# Patient Record
Sex: FEMALE | Race: WHITE | NOT HISPANIC OR LATINO | Employment: FULL TIME | URBAN - METROPOLITAN AREA
[De-identification: names, ages, dates, MRNs, and addresses within clinical notes are randomized per-mention and may not be internally consistent; named-entity substitution may affect disease eponyms.]

---

## 2017-03-07 ENCOUNTER — OFFICE VISIT (OUTPATIENT)
Dept: OPTOMETRY | Facility: CLINIC | Age: 47
End: 2017-03-07
Payer: COMMERCIAL

## 2017-03-07 DIAGNOSIS — D31.31 CHOROIDAL NEVUS, RIGHT: Primary | ICD-10-CM

## 2017-03-07 PROCEDURE — 99999 PR PBB SHADOW E&M-EST. PATIENT-LVL II: CPT | Mod: PBBFAC,,, | Performed by: OPTOMETRIST

## 2017-03-07 PROCEDURE — 92014 COMPRE OPH EXAM EST PT 1/>: CPT | Mod: S$GLB,,, | Performed by: OPTOMETRIST

## 2017-03-07 RX ORDER — PREDNISONE 10 MG/1
TABLET ORAL
Refills: 0 | COMMUNITY
Start: 2017-02-01 | End: 2017-03-07 | Stop reason: ALTCHOICE

## 2017-03-07 RX ORDER — VALACYCLOVIR HYDROCHLORIDE 500 MG/1
TABLET, FILM COATED ORAL
Refills: 0 | COMMUNITY
Start: 2017-02-01 | End: 2017-03-07 | Stop reason: ALTCHOICE

## 2017-03-07 RX ORDER — CYANOCOBALAMIN 1000 UG/ML
INJECTION, SOLUTION INTRAMUSCULAR; SUBCUTANEOUS
Refills: 0 | COMMUNITY
Start: 2017-02-02 | End: 2017-03-07 | Stop reason: ALTCHOICE

## 2017-03-07 RX ORDER — HYDROCHLOROTHIAZIDE 25 MG/1
TABLET ORAL
Refills: 0 | COMMUNITY
Start: 2017-01-27 | End: 2017-03-07 | Stop reason: ALTCHOICE

## 2017-03-07 RX ORDER — METHYLPREDNISOLONE 4 MG/1
TABLET ORAL
Refills: 0 | COMMUNITY
Start: 2017-01-04 | End: 2017-03-07 | Stop reason: ALTCHOICE

## 2017-03-07 NOTE — PROGRESS NOTES
HPI     Patient's age: 46 y.o.    Approximate date of last eye examination:  2/27/15  Name of last eye doctor seen: Dr. Paredes    Pt states that she is here for exam, saw medical doctor who suggested to   get eyes checked again, not having any trouble with eyes.    Wears glasses? no     Wears CLs?:  no            Headaches?  no  Eye pain/discomfort?  no                                                                                     Flashes?  no  Floaters?  no  Diplopia/Double vision?  no    Patient's Ocular History:         Any eye surgeries? no         Family history of eye disease?  no    Significant patient medical history:         1. Diabetes?  no       If yes, IDDM or NIDDM? no   2. HBP?  no                  ! OTC eyedrops currently using:  none   ! Prescription eye meds currently using:  none            Last edited by Sapna De León MA on 3/7/2017  3:58 PM.         Assessment /Plan     For exam results, see Encounter Report.    Choroidal nevus, right      Good overall ocular health, monitor yearly    RTC 1 year, sooner PRN

## 2017-06-19 DIAGNOSIS — Z12.31 VISIT FOR SCREENING MAMMOGRAM: Primary | ICD-10-CM

## 2017-07-05 ENCOUNTER — LAB VISIT (OUTPATIENT)
Dept: LAB | Facility: OTHER | Age: 47
End: 2017-07-05
Attending: INTERNAL MEDICINE
Payer: COMMERCIAL

## 2017-07-05 DIAGNOSIS — N91.1 SECONDARY PHYSIOLOGIC AMENORRHEA: ICD-10-CM

## 2017-07-05 LAB
CALCIUM 24H UR-MRATE: 7 MG/HR
CALCIUM UR-MCNC: 3.7 MG/DL
CALCIUM URINE (MG/SPEC): 157 MG/SPEC
CREAT 24H UR-MRATE: 24.8 MG/HR
CREAT UR-MCNC: 14 MG/DL
CREATININE, URINE (MG/SPEC): 595 MG/SPEC
SODIUM 24H UR-SRATE: 11.2 MMOL/HR
SODIUM UR-SCNC: 63 MMOL/L
SODIUM URINE (MMOL/SPEC): 268 MMOL/SPEC
URINE COLLECTION DURATION: 24 HR
URINE VOLUME: 4250 ML

## 2017-07-05 PROCEDURE — 84300 ASSAY OF URINE SODIUM: CPT

## 2017-07-05 PROCEDURE — 82340 ASSAY OF CALCIUM IN URINE: CPT

## 2017-07-05 PROCEDURE — 82570 ASSAY OF URINE CREATININE: CPT

## 2017-07-06 ENCOUNTER — HOSPITAL ENCOUNTER (OUTPATIENT)
Dept: RADIOLOGY | Facility: OTHER | Age: 47
Discharge: HOME | End: 2017-07-06
Attending: INTERNAL MEDICINE
Payer: COMMERCIAL

## 2017-07-06 ENCOUNTER — PATIENT MESSAGE (OUTPATIENT)
Dept: ENDOCRINOLOGY | Facility: CLINIC | Age: 47
End: 2017-07-06

## 2017-07-06 VITALS — BODY MASS INDEX: 19.46 KG/M2 | WEIGHT: 128 LBS

## 2017-07-06 DIAGNOSIS — R82.994 HYPERCALCIURIA: Primary | ICD-10-CM

## 2017-07-06 DIAGNOSIS — Z12.31 VISIT FOR SCREENING MAMMOGRAM: ICD-10-CM

## 2017-07-06 PROCEDURE — 77067 SCR MAMMO BI INCL CAD: CPT | Mod: 26,,, | Performed by: RADIOLOGY

## 2017-07-06 PROCEDURE — 77063 BREAST TOMOSYNTHESIS BI: CPT | Mod: 26,,, | Performed by: RADIOLOGY

## 2017-07-06 PROCEDURE — 77067 SCR MAMMO BI INCL CAD: CPT | Mod: TC

## 2017-07-07 NOTE — TELEPHONE ENCOUNTER
Patient with large urine volume +4L    Repeat urine calcium   Needs two jugs   She will come on Monday July 10th for  thank you.

## 2017-09-05 ENCOUNTER — LAB VISIT (OUTPATIENT)
Dept: LAB | Facility: OTHER | Age: 47
End: 2017-09-05
Attending: INTERNAL MEDICINE
Payer: COMMERCIAL

## 2017-09-05 DIAGNOSIS — R82.994 HYPERCALCIURIA: ICD-10-CM

## 2017-09-05 LAB
CALCIUM 24H UR-MRATE: 6 MG/HR
CALCIUM UR-MCNC: 3 MG/DL
CALCIUM URINE (MG/SPEC): 149 MG/SPEC
CREAT 24H UR-MRATE: 29.9 MG/HR
CREAT UR-MCNC: 14.5 MG/DL
CREATININE, URINE (MG/SPEC): 717.8 MG/SPEC
URINE COLLECTION DURATION: 24 HR
URINE COLLECTION DURATION: 24 HR
URINE VOLUME: 4950 ML
URINE VOLUME: 4950 ML

## 2017-09-05 PROCEDURE — 82570 ASSAY OF URINE CREATININE: CPT

## 2017-09-05 PROCEDURE — 82340 ASSAY OF CALCIUM IN URINE: CPT

## 2017-10-11 ENCOUNTER — PATIENT MESSAGE (OUTPATIENT)
Dept: ENDOCRINOLOGY | Facility: CLINIC | Age: 47
End: 2017-10-11

## 2017-10-13 ENCOUNTER — PATIENT MESSAGE (OUTPATIENT)
Dept: ENDOCRINOLOGY | Facility: CLINIC | Age: 47
End: 2017-10-13

## 2017-11-18 ENCOUNTER — PATIENT MESSAGE (OUTPATIENT)
Dept: ENDOCRINOLOGY | Facility: CLINIC | Age: 47
End: 2017-11-18

## 2017-11-20 ENCOUNTER — OFFICE VISIT (OUTPATIENT)
Dept: ENDOCRINOLOGY | Facility: CLINIC | Age: 47
End: 2017-11-20
Payer: COMMERCIAL

## 2017-11-20 VITALS
BODY MASS INDEX: 19.91 KG/M2 | SYSTOLIC BLOOD PRESSURE: 110 MMHG | HEART RATE: 63 BPM | HEIGHT: 68 IN | DIASTOLIC BLOOD PRESSURE: 70 MMHG | WEIGHT: 131.38 LBS

## 2017-11-20 DIAGNOSIS — N91.1 SECONDARY PHYSIOLOGIC AMENORRHEA: ICD-10-CM

## 2017-11-20 DIAGNOSIS — R94.6 ABNORMAL THYROID FUNCTION TEST: ICD-10-CM

## 2017-11-20 DIAGNOSIS — M85.80 LOW BONE MASS: Primary | ICD-10-CM

## 2017-11-20 PROCEDURE — 99999 PR PBB SHADOW E&M-EST. PATIENT-LVL III: CPT | Mod: PBBFAC,,, | Performed by: INTERNAL MEDICINE

## 2017-11-20 PROCEDURE — 99214 OFFICE O/P EST MOD 30 MIN: CPT | Mod: S$GLB,,, | Performed by: INTERNAL MEDICINE

## 2017-11-20 RX ORDER — LEVOTHYROXINE SODIUM 25 UG/1
25 TABLET ORAL
Qty: 30 TABLET | Refills: 3 | Status: SHIPPED | OUTPATIENT
Start: 2017-11-20 | End: 2018-04-27

## 2017-11-20 NOTE — PROGRESS NOTES
"Subjective:     Patient ID: Dipti Levin is a 47 y.o. female.    Chief Complaint: Secondary physiologic amenorrhea    HPI:   Ms. Levin is a 47 y.o. female who is here for a follow-up visit for evaluation secondary amenorrhea in the context of low body weight, low bone mass and hypercalciuria. No menstrual bleed since last visit with me.     Since her last appointment with me has attempted IUI X 1 but this failed.   She plans to undergo a second IUI this week or next week.     We reviewed her blood levels done with Dr. Laguerre and Dr. Lucio,.   Serum TSH is 3.4 and 2.4. We discussed ideally serum blood work should be less than 2.5. She does not have a family history of thyroid disease.     We also discussed use of HRT v OCPs if she decides not to pursue additional fertility treatments.     Supplements:  Calcium   Vitamin D  Fish oil     Exercising:  Yoga, walking, swimming      Review of Systems   Constitutional: Negative for chills and fever.   HENT: Negative for congestion and sinus pressure.    Eyes: Negative for visual disturbance.   Respiratory: Negative for chest tightness and shortness of breath.    Cardiovascular: Negative for chest pain, palpitations and leg swelling.   Gastrointestinal: Negative for abdominal pain and vomiting.   Genitourinary: Negative for dysuria.   Musculoskeletal: Negative for arthralgias.   Skin: Negative for rash.   Neurological: Negative for weakness.   Hematological: Does not bruise/bleed easily.   Psychiatric/Behavioral: Negative for sleep disturbance.        Objective:     Physical Exam   Constitutional: She appears well-developed and well-nourished.       Vitals:    11/20/17 1525   BP: 110/70   BP Location: Right arm   Patient Position: Sitting   BP Method: Large (Manual)   Pulse: 63   Weight: 59.6 kg (131 lb 6.3 oz)   Height: 5' 8" (1.727 m)     BMD 2/2018    Results for DIPTI LEVIN (MRN [id number]) as of 11/20/2017 16:03   Ref. Range 9/5/2017 08:41 "   Calcium, Urine Latest Ref Range: 0.0 - 15.0 mg/dL 3.0   Calcium, 24H Urine Latest Ref Range: 4 - 12 mg/Hr 6   CA Urine (mg/Spec) Latest Units: mg/Spec 149   Creatinine, Ur (mg/spec) Latest Units: mg/Spec 717.8     Assessment/Plan:   Ms. Pacheco is a 47 year old woman who is here for following fertility treatment and plans to undergo a second soon.   Also discussed HRT/OCPs, would like to consider (we have discussed in the past but she deferred).     1. Secondary physiologic amenorrhea  - recommend HRT/OCP once patient has completed fertility treatments; discussed benefits and risks of replacement.   - TSH; Future  - T4, free; Future  - Thyroid peroxidase antibody; Future  - TSH  - T4, free  - Thyroid peroxidase antibody  - Calcium, Timed Urine; Future  - Creatinine, urine, timed; Future    Bone density due 2/2018    2. Low bone mass    - Calcium, Timed Urine; Future  - Creatinine, urine, timed; Future    3. Abnormal thyroid function test  - recommended levothyroxine 12.5 mcg daily during fertility treatments.   - TSH; Future  - T4, free; Future  - Thyroid peroxidase antibody; Future  - TSH  - T4, free  - Thyroid peroxidase antibody    F./u with me in one year or sooner as needed.

## 2017-11-29 ENCOUNTER — PATIENT MESSAGE (OUTPATIENT)
Dept: ENDOCRINOLOGY | Facility: CLINIC | Age: 47
End: 2017-11-29

## 2017-12-01 ENCOUNTER — TELEPHONE (OUTPATIENT)
Dept: ENDOCRINOLOGY | Facility: CLINIC | Age: 47
End: 2017-12-01

## 2017-12-01 DIAGNOSIS — M85.80 LOW BONE MASS: Primary | ICD-10-CM

## 2017-12-01 NOTE — TELEPHONE ENCOUNTER
Pt would like to get her BMD done before the end of the year. Can you order for scheduling.    ----- Message from Melissa Hansen sent at 12/1/2017  9:07 AM CST -----  Contact: Pt   [phone]  Dipp   -  Pt calling to be schedule to have an bone density test . Call the pt back to schedule

## 2017-12-05 ENCOUNTER — PATIENT MESSAGE (OUTPATIENT)
Dept: ENDOCRINOLOGY | Facility: CLINIC | Age: 47
End: 2017-12-05

## 2017-12-20 ENCOUNTER — HOSPITAL ENCOUNTER (OUTPATIENT)
Dept: RADIOLOGY | Facility: CLINIC | Age: 47
Discharge: HOME | End: 2017-12-20
Attending: INTERNAL MEDICINE
Payer: COMMERCIAL

## 2017-12-20 DIAGNOSIS — M85.80 LOW BONE MASS: ICD-10-CM

## 2017-12-20 PROCEDURE — 77080 DXA BONE DENSITY AXIAL: CPT | Mod: TC

## 2017-12-20 PROCEDURE — 77080 DXA BONE DENSITY AXIAL: CPT | Mod: 26,,, | Performed by: INTERNAL MEDICINE

## 2018-04-05 ENCOUNTER — PATIENT MESSAGE (OUTPATIENT)
Dept: ENDOCRINOLOGY | Facility: CLINIC | Age: 48
End: 2018-04-05

## 2018-04-12 ENCOUNTER — TELEPHONE (OUTPATIENT)
Dept: OPTOMETRY | Facility: CLINIC | Age: 48
End: 2018-04-12

## 2018-04-12 NOTE — TELEPHONE ENCOUNTER
[employer] Benefits as of 4/16/18:    Member: TRINI LEVIN  YOB: 1970  Subscriber ID: [id number]  Product Name: [id number]  Plan Code: 103  Please Note: Member must be eligible at date of service to receive benefit.  In Network Coverage Frequency  Category Benefit Eligibility Frequency  Exam Available 1 every 12 month(s)   Dilated Fundus Exam: Patient History Currently Unavailable  In Network Coverage  [facility] Patient Responsibility  (includes applicable copay)  Professional Services  Exam $25.00

## 2018-04-16 ENCOUNTER — OFFICE VISIT (OUTPATIENT)
Dept: OPTOMETRY | Facility: CLINIC | Age: 48
End: 2018-04-16
Payer: COMMERCIAL

## 2018-04-16 DIAGNOSIS — Z04.9 DISEASE RULED OUT AFTER EXAMINATION: ICD-10-CM

## 2018-04-16 DIAGNOSIS — H52.4 PRESBYOPIA: Primary | ICD-10-CM

## 2018-04-16 PROCEDURE — 99999 PR PBB SHADOW E&M-EST. PATIENT-LVL II: CPT | Mod: PBBFAC,,, | Performed by: OPTOMETRIST

## 2018-04-16 PROCEDURE — 92014 COMPRE OPH EXAM EST PT 1/>: CPT | Mod: S$GLB,,, | Performed by: OPTOMETRIST

## 2018-04-16 PROCEDURE — 92015 DETERMINE REFRACTIVE STATE: CPT | Mod: S$GLB,,, | Performed by: OPTOMETRIST

## 2018-04-16 NOTE — PROGRESS NOTES
HPI     Patient's age: 46 y.o.    Approximate date of last eye examination:  3/7/17  Name of last eye doctor seen: Dr. Paredes    Pt states that she is here for annual  exam, been noticing lately havinf   trouble seeing phone and eyes are getting tired a lot.    Wears glasses? no     Wears CLs?:  no            Headaches?  no  Eye pain/discomfort?  no                                                                                     Flashes?  no  Floaters?  no  Diplopia/Double vision?  no    Patient's Ocular History:         Any eye surgeries? no         Family history of eye disease?  no    Significant patient medical history:         1. Diabetes?  no       If yes, IDDM or NIDDM? no   2. HBP?  no                  ! OTC eyedrops currently using:  none   ! Prescription eye meds currently using:  none         Last edited by Sapna De León MA on 4/16/2018  3:22 PM. (History)            Assessment /Plan     For exam results, see Encounter Report.    Presbyopia    Disease ruled out after examination      Use +1.00 OTC readers PRN  Good internal ocular health, monitor    RTC 1 year, sooner PRN

## 2018-04-25 ENCOUNTER — TELEPHONE (OUTPATIENT)
Dept: HEMATOLOGY/ONCOLOGY | Facility: CLINIC | Age: 48
End: 2018-04-25

## 2018-04-26 NOTE — PROGRESS NOTES
CC:  macrocytosis    HPI:    Ms. Pacheco is a 48 yo woman with secondary physiologic amenorrhea who presents today for further evaluation of macrocytosis. Her CBC here on 2/14/2014 showed a normal Hb of 13.2, MCV 99. CBC at her PCP, Dr. Celis' office on  on 4/10/18 showed WBC 6.5, H/H 13.2/44.8, .1, plt count 208. CBC on 4/22/18 showed WBC 6.0, H/H 13.0/41.5, .7, plt count 217. TSH 3.1. Vit B12 level normal at 851. She presents today for further workup. She is considering IVF. Was briefly on synthroid 25 mcg this Jan as she was undergoing IUI at that time. No longer taking synthroid. Takes a vit B complex every day. Also has been taking zinc every day for a month for cold. Feels tired today but has been working very hard this week. 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. Father was from [place], mother is from [place]. No family history of hematologic disorder. She does not drink alcohol regularly, maybe one glass of wine every 2 weeks.       Past Medical History:   Diagnosis Date    Allergy     Anxiety          Family History   Problem Relation Age of Onset    Alcohol abuse Brother     Depression Brother     Diabetes Mellitus Paternal Grandmother     Alcohol abuse Paternal Grandfather     Breast cancer Mother 71    Cancer Mother      Breast    Melanoma Neg Hx        Past Surgical History:   Procedure Laterality Date    dental implants         Social History     Social History    Marital status: Single     Spouse name: N/A    Number of children: N/A    Years of education: N/A     Occupational History     [employer]     Social History Main Topics    Smoking status: Never Smoker    Smokeless tobacco: Never Used    Alcohol use Yes      Comment: socially    Drug use: No    Sexual activity: Not Currently     Other Topics Concern    Are You Pregnant Or Think You May Be? No    Breast-Feeding No     Social History Narrative    No narrative on file        Review of Systems   Constitutional: Positive for fatigue. Negative for activity change, appetite change, chills, fever and unexpected weight change.   HENT: Negative for mouth sores, nosebleeds, tinnitus, trouble swallowing and voice change.    Eyes: Negative for pain, redness and visual disturbance.   Respiratory: Negative for cough, shortness of breath and wheezing.    Cardiovascular: Negative for chest pain, palpitations and leg swelling.   Gastrointestinal: Negative for abdominal distention, abdominal pain, blood in stool, diarrhea, nausea and vomiting.   Endocrine: Negative for polydipsia, polyphagia and polyuria.   Genitourinary: Negative for frequency and hematuria.   Musculoskeletal: Negative for arthralgias, back pain, gait problem, joint swelling, myalgias, neck pain and neck stiffness.   Skin: Negative for color change, pallor, rash and wound.   Neurological: Negative for dizziness, tremors, seizures, syncope, speech difficulty, weakness, numbness and headaches.   Hematological: Negative for adenopathy. Does not bruise/bleed easily.   Psychiatric/Behavioral: Negative for confusion, dysphoric mood and self-injury. The patient is not nervous/anxious.        Objective:  Physical Exam   Constitutional: She is oriented to person, place, and time. She appears well-developed and well-nourished. No distress.   HENT:   Head: Normocephalic and atraumatic.   Mouth/Throat: Oropharynx is clear and moist. No oropharyngeal exudate.   Eyes: Conjunctivae and EOM are normal. Pupils are equal, round, and reactive to light. No scleral icterus.   Neck: Normal range of motion. Neck supple. No JVD present. No thyromegaly present.   Cardiovascular: Normal rate, regular rhythm, normal heart sounds and intact distal pulses.  Exam reveals no gallop and no friction rub.    No murmur heard.  Pulmonary/Chest: Effort normal and breath sounds normal. No respiratory distress. She has no wheezes. She has no rales.   Abdominal: Soft.  Bowel sounds are normal. She exhibits no distension and no mass. There is no hepatosplenomegaly. There is no tenderness. No hernia.   Musculoskeletal: Normal range of motion. She exhibits no edema, tenderness or deformity.   Lymphadenopathy:     She has no cervical adenopathy.   Neurological: She is alert and oriented to person, place, and time. No cranial nerve deficit. She exhibits normal muscle tone.   Skin: Skin is warm and dry. No rash noted. She is not diaphoretic. No erythema. No pallor.   Psychiatric: She has a normal mood and affect. Her behavior is normal. Judgment and thought content normal.   Vitals reviewed.      Labs:  Her CBC here on 2/14/2014 showed a normal Hb of 13.2, MCV 99. CBC at her PCP, Dr. Celis' office on  on 4/10/18 showed WBC 6.5, H/H 13.2/44.8, .1, plt count 208. CBC on 4/22/18 showed WBC 6.0, H/H 13.0/41.5, .7, plt count 217. TSH 3.1. Vit B12 level normal at 851.     Assessment and Plan:  1. Macrocytosis    2. Secondary physiologic amenorrhea      1.  - Ms. Pacheco is a very pleasant 48 yo woman with new development of macrocytosis. Has been taking zinc for a month. Vit B12 level normal.   - discussed with Ms. Pacheco that possible causes include folic acid deficiency, copper deficiency, liver disease, HIV, hypothyroidism  - will check CBC, retic, LDH, hapto, folic acid, copper, free T4  - Suspect copper deficiency due to zinc consumption. Asked patient to stop taking zinc  - No contraindication to IVF from a Hematologic Standpoint once copper is repleted (if turns out to have copper deficiency)    2.  - f/u with infertility specialist    RTC on 6/15 with repeat labs for follow up

## 2018-04-27 ENCOUNTER — LAB VISIT (OUTPATIENT)
Dept: LAB | Facility: OTHER | Age: 48
End: 2018-04-27
Attending: INTERNAL MEDICINE
Payer: COMMERCIAL

## 2018-04-27 ENCOUNTER — INITIAL CONSULT (OUTPATIENT)
Dept: HEMATOLOGY/ONCOLOGY | Facility: CLINIC | Age: 48
End: 2018-04-27
Payer: COMMERCIAL

## 2018-04-27 VITALS
BODY MASS INDEX: 19.71 KG/M2 | RESPIRATION RATE: 16 BRPM | HEIGHT: 68 IN | WEIGHT: 130.06 LBS | DIASTOLIC BLOOD PRESSURE: 64 MMHG | TEMPERATURE: 99 F | HEART RATE: 67 BPM | OXYGEN SATURATION: 98 % | SYSTOLIC BLOOD PRESSURE: 101 MMHG

## 2018-04-27 DIAGNOSIS — D75.89 MACROCYTOSIS: Primary | ICD-10-CM

## 2018-04-27 DIAGNOSIS — N91.1 SECONDARY PHYSIOLOGIC AMENORRHEA: ICD-10-CM

## 2018-04-27 DIAGNOSIS — D75.89 MACROCYTOSIS: ICD-10-CM

## 2018-04-27 LAB
ALBUMIN SERPL BCP-MCNC: 4 G/DL
ALP SERPL-CCNC: 63 U/L
ALT SERPL W/O P-5'-P-CCNC: 29 U/L
ANION GAP SERPL CALC-SCNC: 7 MMOL/L
AST SERPL-CCNC: 25 U/L
BASOPHILS # BLD AUTO: 0.04 K/UL
BASOPHILS NFR BLD: 0.7 %
BILIRUB SERPL-MCNC: 0.3 MG/DL
BUN SERPL-MCNC: 20 MG/DL
CALCIUM SERPL-MCNC: 9.7 MG/DL
CHLORIDE SERPL-SCNC: 101 MMOL/L
CO2 SERPL-SCNC: 27 MMOL/L
CREAT SERPL-MCNC: 0.8 MG/DL
DIFFERENTIAL METHOD: ABNORMAL
EOSINOPHIL # BLD AUTO: 0.1 K/UL
EOSINOPHIL NFR BLD: 1.6 %
ERYTHROCYTE [DISTWIDTH] IN BLOOD BY AUTOMATED COUNT: 13.2 %
EST. GFR  (AFRICAN AMERICAN): >60 ML/MIN/1.73 M^2
EST. GFR  (NON AFRICAN AMERICAN): >60 ML/MIN/1.73 M^2
FERRITIN SERPL-MCNC: 47 NG/ML
FOLATE SERPL-MCNC: 11.3 NG/ML
GLUCOSE SERPL-MCNC: 92 MG/DL
HAPTOGLOB SERPL-MCNC: 25 MG/DL
HCT VFR BLD AUTO: 40.8 %
HGB BLD-MCNC: 12.9 G/DL
IRON SERPL-MCNC: 57 UG/DL
LDH SERPL L TO P-CCNC: 164 U/L
LYMPHOCYTES # BLD AUTO: 1.9 K/UL
LYMPHOCYTES NFR BLD: 33.7 %
MCH RBC QN AUTO: 32.7 PG
MCHC RBC AUTO-ENTMCNC: 31.6 G/DL
MCV RBC AUTO: 104 FL
MONOCYTES # BLD AUTO: 0.3 K/UL
MONOCYTES NFR BLD: 6 %
NEUTROPHILS # BLD AUTO: 3.3 K/UL
NEUTROPHILS NFR BLD: 57.8 %
PLATELET # BLD AUTO: 232 K/UL
PMV BLD AUTO: 9.6 FL
POTASSIUM SERPL-SCNC: 5.1 MMOL/L
PROT SERPL-MCNC: 7.3 G/DL
RBC # BLD AUTO: 3.94 M/UL
RETICS/RBC NFR AUTO: 1.2 %
SATURATED IRON: 15 %
SODIUM SERPL-SCNC: 135 MMOL/L
T4 FREE SERPL-MCNC: 0.91 NG/DL
TOTAL IRON BINDING CAPACITY: 370 UG/DL
TRANSFERRIN SERPL-MCNC: 250 MG/DL
WBC # BLD AUTO: 5.64 K/UL

## 2018-04-27 PROCEDURE — 83540 ASSAY OF IRON: CPT

## 2018-04-27 PROCEDURE — 83010 ASSAY OF HAPTOGLOBIN QUANT: CPT

## 2018-04-27 PROCEDURE — 99204 OFFICE O/P NEW MOD 45 MIN: CPT | Mod: S$GLB,,, | Performed by: INTERNAL MEDICINE

## 2018-04-27 PROCEDURE — 82746 ASSAY OF FOLIC ACID SERUM: CPT

## 2018-04-27 PROCEDURE — 80053 COMPREHEN METABOLIC PANEL: CPT

## 2018-04-27 PROCEDURE — 83615 LACTATE (LD) (LDH) ENZYME: CPT

## 2018-04-27 PROCEDURE — 86703 HIV-1/HIV-2 1 RESULT ANTBDY: CPT

## 2018-04-27 PROCEDURE — 99999 PR PBB SHADOW E&M-EST. PATIENT-LVL III: CPT | Mod: PBBFAC,,, | Performed by: INTERNAL MEDICINE

## 2018-04-27 PROCEDURE — 85045 AUTOMATED RETICULOCYTE COUNT: CPT

## 2018-04-27 PROCEDURE — 36415 COLL VENOUS BLD VENIPUNCTURE: CPT

## 2018-04-27 PROCEDURE — 82525 ASSAY OF COPPER: CPT

## 2018-04-27 PROCEDURE — 82728 ASSAY OF FERRITIN: CPT

## 2018-04-27 PROCEDURE — 84439 ASSAY OF FREE THYROXINE: CPT

## 2018-04-27 PROCEDURE — 85025 COMPLETE CBC W/AUTO DIFF WBC: CPT

## 2018-04-27 NOTE — LETTER
April 27, 2018      Dhruv Celis MD  [address]   [address]  [facility] [address]           [facility]  [address], [address]  [facility] [address]  Phone: [phone]          Patient: Dipti Pacheco   MR Number: [id number]   YOB: 1970   Date of Visit: 4/27/2018       Dear Dr. Dhruv Celis:    Thank you for referring Dipti Pacheco to me for evaluation. Attached you will find relevant portions of my assessment and plan of care.    If you have questions, please do not hesitate to call me. I look forward to following Dipti Pacheco along with you.    Sincerely,    Keren Howard MD    Enclosure  CC:  No Recipients    If you would like to receive this communication electronically, please contact [email or web address] or [phone] to request more information on [employer] Link access.    For providers and/or their staff who would like to refer a patient to [facility], please contact us through our one-stop-shop provider referral line, [facility], at [phone].    If you feel you have received this communication in error or would no longer like to receive these types of communications, please e-mail [email or web address]

## 2018-04-27 NOTE — Clinical Note
Labs today (CBC, CMP, copper, retic, haptoglobin, LDH, folic acid, HIV, iron/TIBC, ferritin) RTC on 6/15 with CBC, copper level

## 2018-04-30 LAB — HIV 1+2 AB+HIV1 P24 AG SERPL QL IA: NEGATIVE

## 2018-05-01 LAB — COPPER SERPL-MCNC: 922 UG/L (ref 810–1990)

## 2018-05-04 ENCOUNTER — TELEPHONE (OUTPATIENT)
Dept: HEMATOLOGY/ONCOLOGY | Facility: CLINIC | Age: 48
End: 2018-05-04

## 2018-05-04 ENCOUNTER — PATIENT MESSAGE (OUTPATIENT)
Dept: HEMATOLOGY/ONCOLOGY | Facility: CLINIC | Age: 48
End: 2018-05-04

## 2018-05-10 ENCOUNTER — PATIENT MESSAGE (OUTPATIENT)
Dept: ENDOCRINOLOGY | Facility: CLINIC | Age: 48
End: 2018-05-10

## 2018-05-11 ENCOUNTER — PATIENT MESSAGE (OUTPATIENT)
Dept: ENDOCRINOLOGY | Facility: CLINIC | Age: 48
End: 2018-05-11

## 2018-05-11 DIAGNOSIS — R94.6 ABNORMAL THYROID FUNCTION TEST: Primary | ICD-10-CM

## 2018-05-12 RX ORDER — LEVOTHYROXINE SODIUM 50 UG/1
50 TABLET ORAL DAILY
Qty: 30 TABLET | Refills: 11 | Status: SHIPPED | OUTPATIENT
Start: 2018-05-12 | End: 2019-05-29

## 2018-06-11 ENCOUNTER — PATIENT MESSAGE (OUTPATIENT)
Dept: ENDOCRINOLOGY | Facility: CLINIC | Age: 48
End: 2018-06-11

## 2018-06-14 NOTE — PROGRESS NOTES
PROGRESS NOTE    Subjective:       Patient ID: Dipti Pacheco is a 47 y.o. female.    Chief Complaint: follow up for macrocytosis    HEMATOLOGIC HISTORY:  1. Ms. Pacheco is a 46 yo woman with secondary physiologic amenorrhea who initially saw me on 4/27/18 for further evaluation of macrocytosis. Her CBC here on 2/14/2014 showed a normal Hb of 13.2, MCV 99. CBC at her PCP, Dr. Celis' office on  on 4/10/18 showed WBC 6.5, H/H 13.2/44.8, .1, plt count 208. CBC on 4/22/18 showed WBC 6.0, H/H 13.0/41.5, .7, plt count 217. TSH 3.1. Vit B12 level normal at 851. She presents today for further workup. She is considering IVF. Was briefly on synthroid 25 mcg this Jan as she was undergoing IUI at that time. No longer taking synthroid. Takes a vit B complex every day. Also has been taking zinc every day for a month for cold. Feels tired today but has been working very hard this week. 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 Father was from [place], mother is from [place]. No family history of hematologic disorder. She does not drink alcohol regularly, maybe one glass of wine every 2 weeks. Workup here showed normal retic, LDH, folic acid, copper level, free T4. HIV negative. I asked her to stop taking zinc.     History of Present Illness:   Ms. Pacheco returns for follow up. Went to [facility] and had a good time there. She is starting IVF and will have embryo transfer in 2 weeks. Feeling good. Denies any complaints.     ROS:   Negative    Physical Examination:   Vital signs reviewed.   Gen: well hydrated, well developed, in no acute distress.  HEENT: normocephalic, anicteric, PERRLA, EOMI, oropharynx clear  Neck: supple, no JVD, thyromegaly, cervical or supraclavicular LAD  Lungs: CTAB, no wheezes or rales  Heart: RRR, no M/R/G  Abdomen: soft, no tenderness, non-distended, no hepatosplenomegaly, mass, or hernia.   Ext: no cyanosis,  edema, deformity  Neuro: alert and oriented x 4, no focal neuro deficit  Skin: no rash, open wounds or ulcers  Psych: pleasant and appropriate mood and affect    Objective:       Laboratory Data:  Labs reviewed. CBC shows normal WBC, Hb, and plt. .     Assessment/Plan:     1. Macrocytosis      - extensive workup negative  - not causing her any problems. Will monitor  - RTC in 6 months    Electronically signed by Keren Howard

## 2018-06-15 ENCOUNTER — OFFICE VISIT (OUTPATIENT)
Dept: HEMATOLOGY/ONCOLOGY | Facility: CLINIC | Age: 48
End: 2018-06-15
Payer: COMMERCIAL

## 2018-06-15 ENCOUNTER — PATIENT MESSAGE (OUTPATIENT)
Dept: ENDOCRINOLOGY | Facility: CLINIC | Age: 48
End: 2018-06-15

## 2018-06-15 ENCOUNTER — LAB VISIT (OUTPATIENT)
Dept: LAB | Facility: OTHER | Age: 48
End: 2018-06-15
Attending: INTERNAL MEDICINE
Payer: COMMERCIAL

## 2018-06-15 VITALS
DIASTOLIC BLOOD PRESSURE: 65 MMHG | HEIGHT: 68 IN | SYSTOLIC BLOOD PRESSURE: 109 MMHG | RESPIRATION RATE: 12 BRPM | WEIGHT: 130.94 LBS | OXYGEN SATURATION: 99 % | BODY MASS INDEX: 19.84 KG/M2 | HEART RATE: 66 BPM | TEMPERATURE: 98 F

## 2018-06-15 DIAGNOSIS — D75.89 MACROCYTOSIS: ICD-10-CM

## 2018-06-15 DIAGNOSIS — D75.89 MACROCYTOSIS: Primary | ICD-10-CM

## 2018-06-15 LAB
BASOPHILS # BLD AUTO: 0.04 K/UL
BASOPHILS NFR BLD: 0.5 %
DIFFERENTIAL METHOD: ABNORMAL
EOSINOPHIL # BLD AUTO: 0.1 K/UL
EOSINOPHIL NFR BLD: 0.7 %
ERYTHROCYTE [DISTWIDTH] IN BLOOD BY AUTOMATED COUNT: 13.2 %
HCT VFR BLD AUTO: 41.2 %
HGB BLD-MCNC: 13.2 G/DL
LYMPHOCYTES # BLD AUTO: 2 K/UL
LYMPHOCYTES NFR BLD: 26.2 %
MCH RBC QN AUTO: 32.7 PG
MCHC RBC AUTO-ENTMCNC: 32 G/DL
MCV RBC AUTO: 102 FL
MONOCYTES # BLD AUTO: 0.5 K/UL
MONOCYTES NFR BLD: 6.7 %
NEUTROPHILS # BLD AUTO: 4.9 K/UL
NEUTROPHILS NFR BLD: 65.6 %
PLATELET # BLD AUTO: 249 K/UL
PMV BLD AUTO: 9.6 FL
RBC # BLD AUTO: 4.04 M/UL
WBC # BLD AUTO: 7.49 K/UL

## 2018-06-15 PROCEDURE — 99999 PR PBB SHADOW E&M-EST. PATIENT-LVL III: CPT | Mod: PBBFAC,,, | Performed by: INTERNAL MEDICINE

## 2018-06-15 PROCEDURE — 36415 COLL VENOUS BLD VENIPUNCTURE: CPT

## 2018-06-15 PROCEDURE — 82525 ASSAY OF COPPER: CPT

## 2018-06-15 PROCEDURE — 85025 COMPLETE CBC W/AUTO DIFF WBC: CPT

## 2018-06-15 PROCEDURE — 3008F BODY MASS INDEX DOCD: CPT | Mod: CPTII,S$GLB,, | Performed by: INTERNAL MEDICINE

## 2018-06-15 PROCEDURE — 99213 OFFICE O/P EST LOW 20 MIN: CPT | Mod: S$GLB,,, | Performed by: INTERNAL MEDICINE

## 2018-06-15 RX ORDER — ASCORBIC ACID 1000 MG
1 TABLET ORAL DAILY
COMMUNITY
End: 2019-05-29

## 2018-06-19 LAB — COPPER SERPL-MCNC: 1499 UG/L (ref 810–1990)

## 2018-06-25 ENCOUNTER — PATIENT MESSAGE (OUTPATIENT)
Dept: HEMATOLOGY/ONCOLOGY | Facility: CLINIC | Age: 48
End: 2018-06-25

## 2018-06-30 ENCOUNTER — PATIENT MESSAGE (OUTPATIENT)
Dept: ENDOCRINOLOGY | Facility: CLINIC | Age: 48
End: 2018-06-30

## 2018-07-18 ENCOUNTER — PATIENT MESSAGE (OUTPATIENT)
Dept: ENDOCRINOLOGY | Facility: CLINIC | Age: 48
End: 2018-07-18

## 2018-08-11 ENCOUNTER — PATIENT MESSAGE (OUTPATIENT)
Dept: ENDOCRINOLOGY | Facility: CLINIC | Age: 48
End: 2018-08-11

## 2018-09-12 ENCOUNTER — PATIENT MESSAGE (OUTPATIENT)
Dept: ENDOCRINOLOGY | Facility: CLINIC | Age: 48
End: 2018-09-12

## 2018-09-13 ENCOUNTER — PATIENT MESSAGE (OUTPATIENT)
Dept: ENDOCRINOLOGY | Facility: CLINIC | Age: 48
End: 2018-09-13

## 2018-10-12 ENCOUNTER — PATIENT MESSAGE (OUTPATIENT)
Dept: ENDOCRINOLOGY | Facility: CLINIC | Age: 48
End: 2018-10-12

## 2018-10-13 ENCOUNTER — PATIENT MESSAGE (OUTPATIENT)
Dept: HEMATOLOGY/ONCOLOGY | Facility: CLINIC | Age: 48
End: 2018-10-13

## 2018-10-15 ENCOUNTER — PATIENT MESSAGE (OUTPATIENT)
Dept: ENDOCRINOLOGY | Facility: CLINIC | Age: 48
End: 2018-10-15

## 2018-10-26 ENCOUNTER — HOSPITAL ENCOUNTER (OUTPATIENT)
Dept: RADIOLOGY | Facility: OTHER | Age: 48
Discharge: HOME | End: 2018-10-26
Attending: INTERNAL MEDICINE
Payer: COMMERCIAL

## 2018-10-26 VITALS — HEIGHT: 68 IN | WEIGHT: 130 LBS | BODY MASS INDEX: 19.7 KG/M2

## 2018-10-26 DIAGNOSIS — Z12.39 ENCOUNTER FOR OTHER SCREENING FOR MALIGNANT NEOPLASM OF BREAST: ICD-10-CM

## 2018-10-26 DIAGNOSIS — Z12.31 VISIT FOR SCREENING MAMMOGRAM: ICD-10-CM

## 2018-10-26 PROCEDURE — 77067 SCR MAMMO BI INCL CAD: CPT | Mod: 26,,, | Performed by: RADIOLOGY

## 2018-10-26 PROCEDURE — 77063 BREAST TOMOSYNTHESIS BI: CPT | Mod: 26,,, | Performed by: RADIOLOGY

## 2018-10-26 PROCEDURE — 77063 BREAST TOMOSYNTHESIS BI: CPT | Mod: TC

## 2018-12-13 ENCOUNTER — PATIENT MESSAGE (OUTPATIENT)
Dept: ENDOCRINOLOGY | Facility: CLINIC | Age: 48
End: 2018-12-13

## 2018-12-17 ENCOUNTER — LAB VISIT (OUTPATIENT)
Dept: LAB | Facility: OTHER | Age: 48
End: 2018-12-17
Attending: INTERNAL MEDICINE
Payer: COMMERCIAL

## 2018-12-17 ENCOUNTER — OFFICE VISIT (OUTPATIENT)
Dept: HEMATOLOGY/ONCOLOGY | Facility: CLINIC | Age: 48
End: 2018-12-17
Payer: COMMERCIAL

## 2018-12-17 VITALS
SYSTOLIC BLOOD PRESSURE: 101 MMHG | WEIGHT: 130.75 LBS | HEART RATE: 64 BPM | RESPIRATION RATE: 16 BRPM | BODY MASS INDEX: 19.82 KG/M2 | DIASTOLIC BLOOD PRESSURE: 76 MMHG | HEIGHT: 68 IN | TEMPERATURE: 99 F | OXYGEN SATURATION: 100 %

## 2018-12-17 DIAGNOSIS — D75.89 MACROCYTOSIS: Primary | ICD-10-CM

## 2018-12-17 DIAGNOSIS — D75.89 MACROCYTOSIS: ICD-10-CM

## 2018-12-17 LAB
BASOPHILS # BLD AUTO: 0.04 K/UL
BASOPHILS NFR BLD: 0.9 %
DIFFERENTIAL METHOD: ABNORMAL
EOSINOPHIL # BLD AUTO: 0.1 K/UL
EOSINOPHIL NFR BLD: 2.1 %
ERYTHROCYTE [DISTWIDTH] IN BLOOD BY AUTOMATED COUNT: 12.6 %
HCT VFR BLD AUTO: 41.6 %
HGB BLD-MCNC: 13.9 G/DL
LYMPHOCYTES # BLD AUTO: 1.7 K/UL
LYMPHOCYTES NFR BLD: 39.4 %
MCH RBC QN AUTO: 31.9 PG
MCHC RBC AUTO-ENTMCNC: 33.4 G/DL
MCV RBC AUTO: 95 FL
MONOCYTES # BLD AUTO: 0.3 K/UL
MONOCYTES NFR BLD: 6.9 %
NEUTROPHILS # BLD AUTO: 2.2 K/UL
NEUTROPHILS NFR BLD: 50.5 %
PLATELET # BLD AUTO: 165 K/UL
PMV BLD AUTO: 9 FL
RBC # BLD AUTO: 4.36 M/UL
WBC # BLD AUTO: 4.34 K/UL

## 2018-12-17 PROCEDURE — 99999 PR PBB SHADOW E&M-EST. PATIENT-LVL III: CPT | Mod: PBBFAC,,, | Performed by: INTERNAL MEDICINE

## 2018-12-17 PROCEDURE — 3008F BODY MASS INDEX DOCD: CPT | Mod: CPTII,S$GLB,, | Performed by: INTERNAL MEDICINE

## 2018-12-17 PROCEDURE — 85025 COMPLETE CBC W/AUTO DIFF WBC: CPT

## 2018-12-17 PROCEDURE — 99213 OFFICE O/P EST LOW 20 MIN: CPT | Mod: S$GLB,,, | Performed by: INTERNAL MEDICINE

## 2018-12-17 PROCEDURE — 36415 COLL VENOUS BLD VENIPUNCTURE: CPT

## 2018-12-17 RX ORDER — ESTRADIOL 1 MG/1
TABLET ORAL
Refills: 3 | COMMUNITY
Start: 2018-11-24 | End: 2019-04-16

## 2018-12-17 RX ORDER — ESTRADIOL 2 MG/1
TABLET ORAL
Refills: 3 | COMMUNITY
Start: 2018-11-26 | End: 2019-04-16

## 2018-12-17 NOTE — PROGRESS NOTES
Subjective:       Patient ID: Dipti Pacheco is a 47 y.o. female.     Chief Complaint: follow up for macrocytosis     HEMATOLOGIC HISTORY:  1. Ms. Pacheco is a 46 yo woman with secondary physiologic amenorrhea who initially saw me on 4/27/18 for further evaluation of macrocytosis. Her CBC here on 2/14/2014 showed a normal Hb of 13.2, MCV 99. CBC at her PCP, Dr. Celis' office on  on 4/10/18 showed WBC 6.5, H/H 13.2/44.8, .1, plt count 208. CBC on 4/22/18 showed WBC 6.0, H/H 13.0/41.5, .7, plt count 217. TSH 3.1. Vit B12 level normal at 851. She presents today for further workup. She is considering IVF. Was briefly on synthroid 25 mcg this Jan as she was undergoing IUI at that time. No longer taking synthroid. Takes a vit B complex every day. Also has been taking zinc every day for a month for cold. Feels tired today but has been working very hard this week. 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 Father was from [place], mother is from [facility]. No family history of hematologic disorder. She does not drink alcohol regularly, maybe one glass of wine every 2 weeks. Workup here showed normal retic, LDH, folic acid, copper level, free T4. HIV negative. I asked her to stop taking zinc.      History of Present Illness:   Ms. Pacheco returns for follow up. She had IVF and is 7 weeks pregnant now. She is nauseated and tired, but is happy.      ROS:   Negative     Physical Examination:   Vital signs reviewed.   Gen: well hydrated, well developed, in no acute distress.  HEENT: normocephalic, anicteric, PERRLA, EOMI, oropharynx clear  Neck: supple, no JVD, thyromegaly, cervical or supraclavicular LAD  Lungs: CTAB, no wheezes or rales  Heart: RRR, no M/R/G  Abdomen: soft, rest deferred  Ext: no cyanosis, edema, deformity  Neuro: alert and oriented x 4, no focal neuro deficit  Skin: no rash, open wounds or ulcers  Psych: pleasant and appropriate mood and affect     Objective:          Laboratory Data:  Labs reviewed. CBC shows normal WBC, Hb, and plt. MCV normal at 95     Assessment/Plan:      1. Macrocytosis      - Ms Pacheco is a 47 yo woman  - extensive workup negative  - MCV normalized now  - discussed the result with Ms Pacheco. She can follow up with her PCP and see me on an as-needed basis. I am happy to see her back should questions arise in the future    Distress Screening Results: Psychosocial Distress screening score of Distress Score: 0 noted and reviewed. No intervention indicated.

## 2018-12-21 ENCOUNTER — TELEPHONE (OUTPATIENT)
Dept: OBSTETRICS AND GYNECOLOGY | Facility: CLINIC | Age: 48
End: 2018-12-21

## 2018-12-21 NOTE — TELEPHONE ENCOUNTER
----- Message from Tahira Rivero sent at 12/21/2018 12:45 PM CST -----  Contact: pt   Can the clinic reply in [facility]: yes    Who Called: TRINI LEVIN [[id number]]    Date of Positive Preg Test:  11-    1st day of Last Menstrual Cycle: 11-    List Any Difficulties: no    What Number to Call Back: [phone]

## 2019-01-22 ENCOUNTER — PATIENT MESSAGE (OUTPATIENT)
Dept: OBSTETRICS AND GYNECOLOGY | Facility: CLINIC | Age: 49
End: 2019-01-22

## 2019-01-22 ENCOUNTER — OFFICE VISIT (OUTPATIENT)
Dept: OBSTETRICS AND GYNECOLOGY | Facility: CLINIC | Age: 49
End: 2019-01-22
Payer: COMMERCIAL

## 2019-01-22 VITALS
HEIGHT: 68 IN | WEIGHT: 134.69 LBS | DIASTOLIC BLOOD PRESSURE: 76 MMHG | SYSTOLIC BLOOD PRESSURE: 112 MMHG | BODY MASS INDEX: 20.41 KG/M2

## 2019-01-22 DIAGNOSIS — E03.8 SUBCLINICAL HYPOTHYROIDISM: ICD-10-CM

## 2019-01-22 DIAGNOSIS — O09.91 PREGNANCY, HIGH-RISK, FIRST TRIMESTER: Primary | ICD-10-CM

## 2019-01-22 PROCEDURE — 99203 PR OFFICE/OUTPT VISIT, NEW, LEVL III, 30-44 MIN: ICD-10-PCS | Mod: S$GLB,,, | Performed by: OBSTETRICS & GYNECOLOGY

## 2019-01-22 PROCEDURE — 87086 URINE CULTURE/COLONY COUNT: CPT

## 2019-01-22 PROCEDURE — 99999 PR PBB SHADOW E&M-EST. PATIENT-LVL III: CPT | Mod: PBBFAC,,, | Performed by: OBSTETRICS & GYNECOLOGY

## 2019-01-22 PROCEDURE — 99203 OFFICE O/P NEW LOW 30 MIN: CPT | Mod: S$GLB,,, | Performed by: OBSTETRICS & GYNECOLOGY

## 2019-01-22 PROCEDURE — 99999 PR PBB SHADOW E&M-EST. PATIENT-LVL III: ICD-10-PCS | Mod: PBBFAC,,, | Performed by: OBSTETRICS & GYNECOLOGY

## 2019-01-22 RX ORDER — ACETAMINOPHEN AND CODEINE PHOSPHATE 300; 30 MG/1; MG/1
TABLET ORAL
COMMUNITY
Start: 2018-12-19 | End: 2019-04-16

## 2019-01-22 NOTE — PROGRESS NOTES
Dipti Pacheco is a 48 y.o. , presents today for initial OB visit.    C/C: initial OB visit    HPI: Patient's last menstrual period was 2018. She conceived via IVF (donor egg and donor sperm) in [place] (cost). She had PGS that was normal. She had NIPT that was normal - confirmed male. Overall is feeling well. Reports mild nausea. She is using sea bands which helps. Worse with drinking fluids. Has noticed breast tenderness. She had light implantation bleeding but otherwise no bleeding. Plans to stop estrogen and progesterone this week per [facility] in [place].    SOCIAL HISTORY: Denies emotional/mental/physical/sexual violence or abuse. She is single. She works at [employer]. She is single. Plans to have her mom and a friend attend the delivery. She is considering hiring a .    PAP HISTORY: Last pap 18 normal. No history of abnormal paps.     PMH:  Subclinical hypothyroidism diagnosed with fertility workup (TSH was over 2.5). TSH 0.8 this month. She has been taking synthroid 50 mcg on weekdays and 75 mcg on weekends.    Review of patient's allergies indicates:   Allergen Reactions    Aspirin Nausea Only     Past Medical History:   Diagnosis Date    Allergy     Anxiety     Subclinical hypothyroidism      Past Surgical History:   Procedure Laterality Date    dental implants      Egg retrieval       Past Surgical History:   Procedure Laterality Date    dental implants      Egg retrieval       OB History    Para Term  AB Living   0   0         SAB TAB Ectopic Multiple Live Births                       OB History      Para Term  AB Living    0   0          SAB TAB Ectopic Multiple Live Births                     Social History     Socioeconomic History    Marital status: Single     Spouse name: Not on file    Number of children: Not on file    Years of education: Not on file    Highest education level: Not on file   Social Needs    Financial resource strain:  Not on file    Food insecurity - worry: Not on file    Food insecurity - inability: Not on file    Transportation needs - medical: Not on file    Transportation needs - non-medical: Not on file   Occupational History     Employer: [employer]   Tobacco Use    Smoking status: Never Smoker    Smokeless tobacco: Never Used   Substance and Sexual Activity    Alcohol use: Yes     Comment: socially    Drug use: No    Sexual activity: Yes     Partners: Male   Other Topics Concern    Are you pregnant or think you may be? No    Breast-feeding No   Social History Narrative    Not on file     Family History   Problem Relation Age of Onset    Alcohol abuse Brother     Depression Brother     Diabetes Mellitus Paternal Grandmother     Alcohol abuse Paternal Grandfather     Breast cancer Mother 71    Cancer Mother         Breast    Melanoma Neg Hx      Social History     Substance and Sexual Activity   Sexual Activity Yes    Partners: Male       GENETIC SCREENING   Patient's age 35 years or older as of estimated date of delivery? no  Neural tube defect (meningomyelocele, spina bifida, or anencephaly)? no  Down syndrome? no  Ponce-Sachs (Ashkenazi Confucianist, Cajun, Icelandic [place])? no  Canavan disease (Ashkenazi Confucianist)? no  Familial dysautonomia (Ashkenazi Confucianist)? no  Sickle cell disease or trait ()? no  Hemophilia or other blood disorders? no  Cystic fibrosis? no  Muscular dystrophy? no  [place]'s chorea? no  Thalassemia (Italian, Greek, Mediterranean, or  background) MCV less than 80? no  Congenital heart defect? no  Mental retardation/autism? no   If Yes, was person tested for Fragile X? no  Other inherited genetic or chromosomal disorder? no  Maternal metabolic disorder (e.g. type 1 diabetes, PKU)? no  Patient or baby's father had a child with birth defects not listed above? no  Recurrent pregnancy loss or a stillbirth: no  Medications (including supplements, vitamins, herbs or OTC  "drugs)/illicit/recreational drugs/alcohol since last menstrual period? no   If yes, agent(s) and strength/dose: no  List any other genetic risks: no  Comments/counseling: no    INFECTION HISTORY  Live with someone with TB or exposed to TB: no  Patient or partner has history of genital herpes: yes  Rash or viral illness since last menstrual period: no  Patient or partner has hepatitis B or C: no  History of STD, gonorrhea, chlamydia, HPV, HIV, syphilis (list all that apply): no  List other infections: no  Additional comments: no    Dhruv Celis MD     ROS:    Constitutional/Gen: Denies fevers, chills, malaise, or weight loss. Pos fatigue   Psych: Denies depression, anxiety  Eyes: Denies changes in vision or scotomata  Ears, nose, mouth, throat: Denies sinus tenderness, swelling, or dentition problems  CV/vasc: Denies heart palpitations or edema  Resp: Denies SOB or dyspnea  Breasts: Denies mass, nipple discharge, or trauma. Pos breast tenderness.  GI: Denies constipation, diarrhea, or vomiting. Pos nausea.  : Denies vaginal discharge, dysuria or pelvic pain. Pos urinary frequency  MS: Denies weakness, soreness, or changes in ROM    OBJECTIVE:  /76   Ht 5' 8" (1.727 m)   Wt 61.1 kg (134 lb 11.2 oz)   LMP 11/04/2018   BMI 20.48 kg/m²   Constitutional/Gen: NAD, appears stated age, well groomed  Neck: supple, no masses or enlargement  Head: normocephalic  Skin: warm and dry w/o rash  Lung: normal resp effort, CTAB  Heart: normal HR, RRR   Back: negative CVAT  Breasts: bilaterally--no masses, tenderness, skin changes, or nipple discharge noted  Abdomen: soft, nontender, no masses, and bowel sounds normal, no enlargement  External genitalia: no lesions or discharge, normal hair distribution  Urethral meatus: normal size and location, no lesions or prolapse  Vagina: normal appearance, no lesions, no discharge, no evidence cystocele or rectocele.  Cervix: normal appearance, no discharge, no lesions, negative " CMT  Uterus: nontender, mobile, approx 12 week size, contour, and position. +++ FHTs via doppler  Adnexa: no masses or tenderness  Anus/Perineum: normal appearance, with no lesions or discharge. Internal exam deferred.  Extremities: FROM, with no edema or tenderness.  Neurologic: A&O x 4, non-focal, cranial nerves 2-12 grossly intact  Psych: affect appropriate and without signs of mood, thought or memory difficulty appreciated    UPT pos in office    ASSESSMENT:  48 y.o. female  with amenorrhea  Likely at 12w1d via LMP; S=D  Body mass index is 20.48 kg/m².  Patient Active Problem List   Diagnosis    Mouth ulcers    Leukoplakia    Secondary physiologic amenorrhea    Low bone mass    Hearing loss sensory, bilateral       PLAN:  1. Amenorrhea  -- + UPT in office, Patient's last menstrual period was 2018. --> Estimated Date of Delivery: 19  -- Dating US done with [employer]  -- Anatomical US 19-20 weeks  -- Routine serum and urine prenatal labs today    2. Physical exam today  -- Pap up to date.    3. BMI   -- Discussed IOM recommended weight gain of:   Underweight Less than 18.5 28-40    Normal Weight 18.5-24.9  25-35    Overweight 25-29.9  15-25    Obese   30 and greater  11-20   -- Discussed criteria for delivery at [facility] r/t excessive pre-preg weight or excessive weight gain:   Pre-pregnancy BMI over 40 or excess pregnancy weight gain defined as:   Pre-preg BMI < 18.5; Excess weight gain = > 60 pound   Pre-preg BMI 18.5-24.9;  Excess weight gain = > 53 pounds   Pre-preg BMI 25-29.9;  Excess weight gain = > 38 pounds   Pre-preg BMI > 30;  Excess weight gain = > 30 pounds    4. Discussed nausea and vomiting in pregnancy  -- Education regarding lifestyle and dietary modifications  -- Reviewed use of B6/Unisom prn. Pt will notify us if no relief/worsening symptoms, will consider alternative therapies prn    5. Pregnancy education and couseling; handouts and booklet provided  -- Oriented to  practice and anticipated prenatal course of care and how to contact us  -- Precautions/warning signs reviewed  -- Common complaints of pregnancy  -- Routine prenatal labs including HIV  -- Ultrasounds  -- Nutrition, prepregnant BMI, and recommended weight gain  -- Toxoplasmosis precautions (Cats/Raw Meat)  -- Sexual activity and exercise  -- Environmental/Work hazards  -- Travel  -- Tobacco (Ask, Advise, Assess, Assist, and Arrange), as well as alcohol and drug use  -- Use of any medications (Including supplements, Vitamins, Herbs, or OTC Drugs)  -- Domestic violence screen neg    6. Reviewed genetic testing options. Reviewed available first trimester and/or second  trimester screening options. Reviewed risk of false positive/negative results and recommendation of referral to [facility] in event of a positive result, for NIPT, US, and/or amniocentesis. Reviewed the possible estimated 1 in 300/500 risk of miscarriage with amniocentesis, even with a healthy fetus. Patient has had NIPT done.     7. AMA  -- NIPT done  -- Discussed risks associated with pregnancies over 40.  -- Recommended baby ASA at 12 weeks    8. IVF Pregnancy  -- Fetal Echo 22-24 weeks  -- Growth 32 weeks  -- Weekly PNT 32 weeks  -- Delivery by FENG    9. History of HSV   -- No recent outbreaks  -- PPx at 36 weeks    10. Subclinical hypothyroidism  -- Recent TSH 0.8  -- Recommended to continue synthroid 50 mcg daily    Discussed [facility], patient will consider. She wants to take prenatal classes. Briefly discussed patient's desire for unmedicated childbirth. Briefly discussed  circumcision.     RTC x 4 weeks, call or present sooner prn.     Updated Medication List:  Current Outpatient Medications   Medication Sig Dispense Refill    B INFANTIS/B ANI/B JET/B BIFID (PROBIOTIC DIGESTIVE CARE ORAL) Take 1 capsule by mouth as needed.      calcium-vitamin D3 (CALCIUM 500 + D) 500 mg(1,250mg) -200 unit per tablet Take 1 tablet by mouth 2 (two) times  daily with meals.      cyanocobalamin, vitamin B-12, (VITAMIN B-12) 50 mcg tablet Take 50 mcg by mouth once daily.      estradiol (ESTRACE) 1 MG tablet TK 1 T PO BID  3    estradiol (ESTRACE) 2 MG tablet TK 1 T PO  BID 12 HOURS APART  3    ginkgo biloba 40 mg Tab Take 1 tablet by mouth once daily.      levothyroxine (SYNTHROID) 50 MCG tablet Take 1 tablet (50 mcg total) by mouth once daily. 30 tablet 11    multivitamin (ONE DAILY MULTIVITAMIN) per tablet Take 1 tablet by mouth once daily.      prenatal vit 55-iron-folic-om3 29-1-430 mg CPKD Take 1 tablet by mouth once daily.      PROGESTERONE IU       acetaminophen-codeine 300-30mg (TYLENOL #3) 300-30 mg Tab        No current facility-administered medications for this visit.          Edith Machado MD  1/23/2019 3:44 PM

## 2019-01-23 ENCOUNTER — PATIENT MESSAGE (OUTPATIENT)
Dept: OBSTETRICS AND GYNECOLOGY | Facility: CLINIC | Age: 49
End: 2019-01-23

## 2019-01-24 LAB
BACTERIA UR CULT: NORMAL
BACTERIA UR CULT: NORMAL

## 2019-01-27 ENCOUNTER — PATIENT MESSAGE (OUTPATIENT)
Dept: OBSTETRICS AND GYNECOLOGY | Facility: CLINIC | Age: 49
End: 2019-01-27

## 2019-02-04 ENCOUNTER — PATIENT MESSAGE (OUTPATIENT)
Dept: OBSTETRICS AND GYNECOLOGY | Facility: CLINIC | Age: 49
End: 2019-02-04

## 2019-02-07 ENCOUNTER — PATIENT MESSAGE (OUTPATIENT)
Dept: OBSTETRICS AND GYNECOLOGY | Facility: CLINIC | Age: 49
End: 2019-02-07

## 2019-02-08 ENCOUNTER — PATIENT MESSAGE (OUTPATIENT)
Dept: OBSTETRICS AND GYNECOLOGY | Facility: CLINIC | Age: 49
End: 2019-02-08

## 2019-02-08 ENCOUNTER — CLINICAL SUPPORT (OUTPATIENT)
Dept: REHABILITATION | Facility: HOSPITAL | Age: 49
End: 2019-02-08
Attending: OBSTETRICS & GYNECOLOGY
Payer: COMMERCIAL

## 2019-02-08 DIAGNOSIS — R29.3 POSTURE IMBALANCE: ICD-10-CM

## 2019-02-08 PROCEDURE — 97162 PT EVAL MOD COMPLEX 30 MIN: CPT | Mod: PO

## 2019-02-08 PROCEDURE — 97110 THERAPEUTIC EXERCISES: CPT | Mod: PO

## 2019-02-08 NOTE — PLAN OF CARE
Physical Therapy Evaluation    Name: Dipti Patel [facility]  Clinic Number: [id number]    Diagnosis:   Encounter Diagnosis   Name Primary?    Posture imbalance      Physician: Edith Machado MD  Treatment Orders: PT Eval and Treat    History     Past Medical History:   Diagnosis Date    Allergy     Anxiety     Subclinical hypothyroidism      Current Outpatient Medications   Medication Sig    acetaminophen-codeine 300-30mg (TYLENOL #3) 300-30 mg Tab     B INFANTIS/B ANI/B JET/B BIFID (PROBIOTIC DIGESTIVE CARE ORAL) Take 1 capsule by mouth as needed.    calcium-vitamin D3 (CALCIUM 500 + D) 500 mg(1,250mg) -200 unit per tablet Take 1 tablet by mouth 2 (two) times daily with meals.    cyanocobalamin, vitamin B-12, (VITAMIN B-12) 50 mcg tablet Take 50 mcg by mouth once daily.    estradiol (ESTRACE) 1 MG tablet TK 1 T PO BID    estradiol (ESTRACE) 2 MG tablet TK 1 T PO  BID 12 HOURS APART    ginkgo biloba 40 mg Tab Take 1 tablet by mouth once daily.    levothyroxine (SYNTHROID) 50 MCG tablet Take 1 tablet (50 mcg total) by mouth once daily.    multivitamin (ONE DAILY MULTIVITAMIN) per tablet Take 1 tablet by mouth once daily.    prenatal vit 55-iron-folic-om3 29-1-430 mg CPKD Take 1 tablet by mouth once daily.    PROGESTERONE IU      No current facility-administered medications for this visit.      Review of patient's allergies indicates:   Allergen Reactions    Aspirin Nausea Only       Evaluation Date: 2/8/19  Visit # authorized: 1/20  Authorization period: 12/31/19  Plan of care expiration: 4/7/19    Subjective   Pt. is 14 weeks pregnant    History of present condition:  Dipti is a 48 y.o. female that presents to [facility] secondary to hi risk pregnancy. Injury/surgery occurred approximately: September 2018. Pt. presents with the following co-morbidities and personal factors that directly impact her plan of care: chronicity of condition.        Precautions:  standard    Onset/CHAR: sudden: in September pt. was working out: lifting weighted bar [place], dead lifts; noted having extreme pain in low back. Pt. went to the chiropractor for treatment: went 2 to 3 times per week for a few weeks. Most recently discontinued treatment. Current symptoms include right sided low back pain greater than left.   Red Flags:  · Bowel/bladder symptoms (urinary retention/fecal incontinence)? No  · Recent weight loss? No.  · Constant/Night pain that is unchanging with change of position? No.  · PMH of CA? No.   · Numbness or Tingling? No.    Aggravating factors: lifting objects, carrying objects I.e. groceries from car to home, and prolonged sitting  Relieving factors: movement  Pain Scale: Dipti rates pain on a scale of 0-10 to be 0 at currently; 0 at best; 5 at worst.    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  ADLs: Pt has a decrease ability to perform ADLs such as see above.    Patient Goals: Pt would like to learn advise/suggestions for education on exercises, symptom management, and obtain information to assist her with pregnancy    Objective     Observation: ectomorph    Posture: slightly kyphotic    Lumbar ROM: (measured in degrees)    Degrees Quality   Flexion   WFL    Left Side Bending WFL    Right Side Bending WFL    Left rotation 25%    Right Rotation 25%      Dermatomes: (impaired/normal)     RLE LLE   L2 Intact Intact   L3 Intact Intact   L4 Intact Intact   L5 Intact Intact   S1 Intact Intact     Reflexes: L3; 2 left; unable to obtain right; S1: 2 bilaterally    Lower Extremity Strength (graded 0-5 out of 5)   RLE LLE   Hip  flexion: 4/5 4/5   Hip ER 4/5 4/5   Hip IR 4+/5 4+/5   Knee extension: 4+/5 4+/5   Ankle dorsiflexion: 5/5 5/5   Great toe extension: 4+/5 4+/5   Posterior fibers of Gluteus medius 4+/5 4+/5   Knee flexion seated 4+/5 4+/5   Ankle plantarflexion seated 4+/5 4+/5     Special Tests: ((+): pos.; (-): neg.)    · Bridge Test: +  · Pirformis Test: -    Flexibility:   · Popliteal Angle: R = 80 degrees ; L = 70 degrees    Palpation for condition:   · Position:     · Warmth:   · Swelling:   · Texture:     Joint Mobility: (graded 0-6 out of 6) NT    Functional Status Measures:    Intake Score     Pts Physical FS Primary Measure      63                          Risk Adjustment Statistical FOTO     57        PT reviewed FOTO scores for Dipti Pacheco on 02/08/2019.   FOTO scores were entered into [employer] - see media section.    History  Co-morbidities and personal factors that may impact the plan of care Examination  Body Structures and Functions, activity limitations and participation restrictions that may impact the plan of care Clinical Presentation   Decision Making/ Complexity Score   Co-morbidities:   chronicity of condition            Personal Factors:   none Body Regions: cervical/left shoulder/SIJ/pelvic dysfunction    Body Systems: Decreased lumbar AROM; core hip lumbopelvic weakness; poor posture; pain with transitional activities, decrease exercise ability, and pain with ADLs.     Activity limitations: lifting and prolonged sitting    Participation Restrictions: some yoga poses and lifting objects evolving with changing clinical characteristics   moderate     Clinical Presentation/complexity category  Moderate complexity category: pt. has 1-2 personal factors and/or co-morbidities directly  impacting POC, 3 or more body system impairments/functional limitations/participation restrictions; as well as, condition is evolving with changing clinical characteristics.    PT Evaluation Completed? Yes  Discussed  Plan of Care with patient: Yes    TREATMENT:  Therapeutic exercise: Dipti received therapeutic exercises to develop strength and endurance, flexibility for 10 minutes including: bridge with band, isometric hip abduction/adduction, self rib mobilization with UT stretch, serratus slides, Breugger's exercise, and education on pelvic stabilization.  *Pt was advised to perform these exercises free of pain, and discontinue use if symptoms persist/worsen.    Pt. Education: Instructed pt. regarding:body mechanics, posture, activity modification/avoidance, and proper technique with all exercises. Pt. to demonstrate good understanding of the education provided. Dipti demonstrated good return demonstration of activities. No cultural, environmental, or spiritual barriers identified to treatment or learning.    Medical necessity is demonstrated by the following IMPAIRMENTS/PROBLEM LIST:   1) Pain limiting function   2) Posture dysfunction   3) Core/Lumbar/LE weakness   4) Decreased thoracic/lumbar joint mobility   5) Decreased Lumbar ROM   6) Decreased soft tissue extensibility/fascia restriction   7) Decreased LE flexibility: hamstrings   8) Lack of HEP   9) posture imbalance    GOALS:   Short Term Goals:  2 weeks  1. Pt. to demonstrate proper cervical and scapula retraction requiring min. to no verbal cues from PT  2. Pt. to demonstrate increased MMT for core/lumbar paraspinals to 3/5 to increase endurance with prolonged sitting/standing.  3. Pt. to be independent with symptom management  4. Pt to tolerate HEP to improve ROM and independence with ADL's    Long Term Goals: 4 weeks  1. Report decreased lumbar spine pain </=  2/10 at worst to increase tolerance for prolonged sitting/standing  2. Pt. to demonstrate proper cervical and scapula retraction requiring no verbal cues from PT  3. Pt. to demonstrate increased MMT for core/lumbar paraspinals to 3+/5 to increase endurance with prolonged sitting.   4. Pt. to demonstrate  increased MMT for bilateral gluteus medius to 5/5 to increase stability during ambulation on uneven surfaces.  5. Pt. to demonstrate increased MMT for bilateral hip flexor to 5/5 to increase tolerance for ADL and work activities.   6. Pt to be independent with HEP to improve ROM and independence with ADL's  7. Pt. to be independent with pelvis stabilization/postures/symptom management  Assessment   This is a 48 y.o. female referred to outpatient physical therapy who presents with a medical diagnosis of high risk pregnancy and PT diagnosis of posture imbalance demonstrating joint dysfunction and functional limitation as described above. Level of complexity is moderate;  based on patient's past medical history including the above co-morbidities and personal factors; functional limitations, and clinical presentation directly impacting his/her plan of care. Pt demonstrates good rehab potential.    Patient presents with signs and symptoms consistent with the above diagnoses. Pt. could benefit from a pelvic  consult regarding pelvic floor training. Pt. was educated on the prognosis and the progression of her pregnancy, which may change her current symptoms. Pt. verbalized understanding. Patient was in agreement with set goals and plan of care. Pt will benefit from physical therapy services in order to maximize pain free functional independence.     Plan     Pt will be treated by physical therapy 1-3 times a week for 8 weeks for pt. education, HEP, aquatic therapy if land based therapy is not tolerable, therapeutic exercises, neuromuscular re-education, soft tissue and joint mobilizations; and modalities, including but not exclusive to dry needling, prn to achieve established goals. Dipti may at times be seen by a PTA as part of the Rehab Team.     I certify the need for these services furnished under this plan of treatment and while under my care.______________________________ Physician/Referring  Practitioner  Date of Signature

## 2019-02-22 ENCOUNTER — LAB VISIT (OUTPATIENT)
Dept: LAB | Facility: OTHER | Age: 49
End: 2019-02-22
Attending: OBSTETRICS & GYNECOLOGY
Payer: COMMERCIAL

## 2019-02-22 ENCOUNTER — PATIENT MESSAGE (OUTPATIENT)
Dept: ADMINISTRATIVE | Facility: OTHER | Age: 49
End: 2019-02-22

## 2019-02-22 ENCOUNTER — ROUTINE PRENATAL (OUTPATIENT)
Dept: OBSTETRICS AND GYNECOLOGY | Facility: CLINIC | Age: 49
End: 2019-02-22
Payer: COMMERCIAL

## 2019-02-22 VITALS
WEIGHT: 138.25 LBS | DIASTOLIC BLOOD PRESSURE: 58 MMHG | BODY MASS INDEX: 21.02 KG/M2 | SYSTOLIC BLOOD PRESSURE: 100 MMHG

## 2019-02-22 DIAGNOSIS — Z36.89 ENCOUNTER FOR FETAL ANATOMIC SURVEY: ICD-10-CM

## 2019-02-22 DIAGNOSIS — O09.92 SUPERVISION OF HIGH RISK PREGNANCY IN SECOND TRIMESTER: Primary | ICD-10-CM

## 2019-02-22 DIAGNOSIS — L29.9 SKIN PRURITUS: ICD-10-CM

## 2019-02-22 DIAGNOSIS — N89.8 VAGINAL ITCHING: ICD-10-CM

## 2019-02-22 DIAGNOSIS — O09.522 ELDERLY MULTIGRAVIDA IN SECOND TRIMESTER: ICD-10-CM

## 2019-02-22 DIAGNOSIS — O09.92 SUPERVISION OF HIGH RISK PREGNANCY IN SECOND TRIMESTER: ICD-10-CM

## 2019-02-22 DIAGNOSIS — E03.8 SUBCLINICAL HYPOTHYROIDISM: ICD-10-CM

## 2019-02-22 DIAGNOSIS — O09.812 HIGH RISK PREGNANCY DUE TO ASSISTED REPRODUCTIVE TECHNOLOGY IN SECOND TRIMESTER: ICD-10-CM

## 2019-02-22 LAB
FERRITIN SERPL-MCNC: 56 NG/ML
IRON SERPL-MCNC: 78 UG/DL
SATURATED IRON: 22 %
TOTAL IRON BINDING CAPACITY: 351 UG/DL
TRANSFERRIN SERPL-MCNC: 237 MG/DL
TSH SERPL DL<=0.005 MIU/L-ACNC: 2.23 UIU/ML

## 2019-02-22 PROCEDURE — 0502F PR SUBSEQUENT PRENATAL CARE: ICD-10-PCS | Mod: CPTII,S$GLB,, | Performed by: OBSTETRICS & GYNECOLOGY

## 2019-02-22 PROCEDURE — 0502F SUBSEQUENT PRENATAL CARE: CPT | Mod: CPTII,S$GLB,, | Performed by: OBSTETRICS & GYNECOLOGY

## 2019-02-22 PROCEDURE — 84443 ASSAY THYROID STIM HORMONE: CPT

## 2019-02-22 PROCEDURE — 87510 GARDNER VAG DNA DIR PROBE: CPT

## 2019-02-22 PROCEDURE — 83540 ASSAY OF IRON: CPT

## 2019-02-22 PROCEDURE — 82728 ASSAY OF FERRITIN: CPT

## 2019-02-22 PROCEDURE — 99999 PR PBB SHADOW E&M-EST. PATIENT-LVL II: ICD-10-PCS | Mod: PBBFAC,,, | Performed by: OBSTETRICS & GYNECOLOGY

## 2019-02-22 PROCEDURE — 87480 CANDIDA DNA DIR PROBE: CPT

## 2019-02-22 PROCEDURE — 99999 PR PBB SHADOW E&M-EST. PATIENT-LVL II: CPT | Mod: PBBFAC,,, | Performed by: OBSTETRICS & GYNECOLOGY

## 2019-02-22 PROCEDURE — 36415 COLL VENOUS BLD VENIPUNCTURE: CPT

## 2019-02-22 RX ORDER — HYDROCORTISONE 25 MG/G
CREAM TOPICAL 2 TIMES DAILY
Qty: 20 G | Refills: 1 | Status: SHIPPED | OUTPATIENT
Start: 2019-02-22 | End: 2019-04-01 | Stop reason: SDUPTHER

## 2019-02-25 ENCOUNTER — PATIENT MESSAGE (OUTPATIENT)
Dept: OBSTETRICS AND GYNECOLOGY | Facility: CLINIC | Age: 49
End: 2019-02-25

## 2019-02-25 PROBLEM — O09.812 HIGH RISK PREGNANCY DUE TO ASSISTED REPRODUCTIVE TECHNOLOGY IN SECOND TRIMESTER: Status: ACTIVE | Noted: 2019-02-25

## 2019-02-25 PROBLEM — O09.92 SUPERVISION OF HIGH RISK PREGNANCY IN SECOND TRIMESTER: Status: ACTIVE | Noted: 2019-02-25

## 2019-02-25 PROBLEM — O09.522 ELDERLY MULTIGRAVIDA IN SECOND TRIMESTER: Status: ACTIVE | Noted: 2019-02-25

## 2019-02-26 ENCOUNTER — PATIENT OUTREACH (OUTPATIENT)
Dept: OTHER | Facility: OTHER | Age: 49
End: 2019-02-26

## 2019-02-26 NOTE — TELEPHONE ENCOUNTER
Initial introduction with MsIrving Dipti Parikh Amanda Pacheco completed and the role of the health coaches for [facility] was explained.     Reviewed the importance of taking weights and blood pressure readings, using the health  as a resource for physical activity and dietary habits, and that [facility] messages will be sent for weeks 20, 30, and 37 home urine tests.  Reviewed that the patient should contact her OB team with any specific questions regarding her pregnancy, and to contact [facility] or 911 in the case of a medical emergency.

## 2019-02-28 ENCOUNTER — PATIENT MESSAGE (OUTPATIENT)
Dept: OBSTETRICS AND GYNECOLOGY | Facility: CLINIC | Age: 49
End: 2019-02-28

## 2019-02-28 LAB
BACTERIAL VAGINOSIS DNA: NEGATIVE
CANDIDA GLABRATA DNA: NEGATIVE
CANDIDA KRUSEI DNA: NEGATIVE
CANDIDA RRNA VAG QL PROBE: NEGATIVE
G VAGINALIS RRNA GENITAL QL PROBE: NORMAL
T VAGINALIS RRNA GENITAL QL PROBE: NEGATIVE

## 2019-03-04 ENCOUNTER — PATIENT MESSAGE (OUTPATIENT)
Dept: OBSTETRICS AND GYNECOLOGY | Facility: CLINIC | Age: 49
End: 2019-03-04

## 2019-03-07 DIAGNOSIS — O09.92 SUPERVISION OF HIGH RISK PREGNANCY IN SECOND TRIMESTER: Primary | ICD-10-CM

## 2019-03-18 ENCOUNTER — PROCEDURE VISIT (OUTPATIENT)
Dept: MATERNAL FETAL MEDICINE | Facility: CLINIC | Age: 49
End: 2019-03-18
Payer: COMMERCIAL

## 2019-03-18 ENCOUNTER — TELEPHONE (OUTPATIENT)
Dept: PEDIATRIC CARDIOLOGY | Facility: CLINIC | Age: 49
End: 2019-03-18

## 2019-03-18 ENCOUNTER — INITIAL PRENATAL (OUTPATIENT)
Dept: OBSTETRICS AND GYNECOLOGY | Facility: CLINIC | Age: 49
End: 2019-03-18
Payer: COMMERCIAL

## 2019-03-18 DIAGNOSIS — Z34.92 PRENATAL CARE IN SECOND TRIMESTER: Primary | ICD-10-CM

## 2019-03-18 DIAGNOSIS — O09.819 PREGNANCY RESULTING FROM IN VITRO FERTILIZATION, ANTEPARTUM: ICD-10-CM

## 2019-03-18 DIAGNOSIS — O09.519 ADVANCED MATERNAL AGE, PRIMIGRAVIDA, ANTEPARTUM: Primary | ICD-10-CM

## 2019-03-18 DIAGNOSIS — Z36.89 ENCOUNTER FOR ULTRASOUND TO CHECK FETAL GROWTH: ICD-10-CM

## 2019-03-18 DIAGNOSIS — Z36.89 ENCOUNTER FOR FETAL ANATOMIC SURVEY: ICD-10-CM

## 2019-03-18 PROCEDURE — 99499 NO LOS: ICD-10-PCS | Mod: S$GLB,,, | Performed by: ADVANCED PRACTICE MIDWIFE

## 2019-03-18 PROCEDURE — 76811 PR US, OB FETAL EVAL & EXAM, TRANSABDOM,FIRST GESTATION: ICD-10-PCS | Mod: S$GLB,,, | Performed by: OBSTETRICS & GYNECOLOGY

## 2019-03-18 PROCEDURE — 99999 PR PBB SHADOW E&M-EST. PATIENT-LVL I: CPT | Mod: PBBFAC,,, | Performed by: ADVANCED PRACTICE MIDWIFE

## 2019-03-18 PROCEDURE — 99499 NO LOS: ICD-10-PCS | Mod: S$GLB,,, | Performed by: OBSTETRICS & GYNECOLOGY

## 2019-03-18 PROCEDURE — 99499 UNLISTED E&M SERVICE: CPT | Mod: S$GLB,,, | Performed by: OBSTETRICS & GYNECOLOGY

## 2019-03-18 PROCEDURE — 99499 UNLISTED E&M SERVICE: CPT | Mod: S$GLB,,, | Performed by: ADVANCED PRACTICE MIDWIFE

## 2019-03-18 PROCEDURE — 99999 PR PBB SHADOW E&M-EST. PATIENT-LVL I: ICD-10-PCS | Mod: PBBFAC,,, | Performed by: ADVANCED PRACTICE MIDWIFE

## 2019-03-18 PROCEDURE — 76811 OB US DETAILED SNGL FETUS: CPT | Mod: S$GLB,,, | Performed by: OBSTETRICS & GYNECOLOGY

## 2019-03-18 NOTE — TELEPHONE ENCOUNTER
Spoke with patient via telephone. Fetal echo scheduled for 4/30/19 @ 245 pm. Office number and address verified.

## 2019-03-19 ENCOUNTER — PATIENT MESSAGE (OUTPATIENT)
Dept: OBSTETRICS AND GYNECOLOGY | Facility: CLINIC | Age: 49
End: 2019-03-19

## 2019-03-19 ENCOUNTER — PATIENT MESSAGE (OUTPATIENT)
Dept: ADMINISTRATIVE | Facility: OTHER | Age: 49
End: 2019-03-19

## 2019-03-19 NOTE — PROGRESS NOTES
Dipti presented for discussion/information seeking visit only - NOT formally transferring at this time, just considering transfer and wanted to learn more about the midwifery service at [facility]. Did not take any vital signs not auscultate FHTs - she just wants to talk today. Has anatomy US today after our appt.  Questions answered, tour provided  49yo G1 at 20w0d via IVF   -- AMA at 49yo: Reviewed MFM recommendations  -- IVF: Reviewed 3T IVF recommendations and MFM recommendations for IOL/timing of delivery  -- HSV: Reviewed recommendation for suppressive meds and implications for delivery  -- Subclinical hypothyroidism on synthroid: continue meds and labs

## 2019-03-20 ENCOUNTER — PATIENT MESSAGE (OUTPATIENT)
Dept: OBSTETRICS AND GYNECOLOGY | Facility: CLINIC | Age: 49
End: 2019-03-20

## 2019-03-30 ENCOUNTER — PATIENT MESSAGE (OUTPATIENT)
Dept: OBSTETRICS AND GYNECOLOGY | Facility: CLINIC | Age: 49
End: 2019-03-30

## 2019-03-30 DIAGNOSIS — L29.9 SKIN PRURITUS: ICD-10-CM

## 2019-04-01 RX ORDER — HYDROCORTISONE 25 MG/G
CREAM TOPICAL 2 TIMES DAILY
Qty: 28 G | Refills: 3 | Status: SHIPPED | OUTPATIENT
Start: 2019-04-01 | End: 2019-08-06

## 2019-04-05 ENCOUNTER — CLINICAL SUPPORT (OUTPATIENT)
Dept: REHABILITATION | Facility: HOSPITAL | Age: 49
End: 2019-04-05
Attending: OBSTETRICS & GYNECOLOGY
Payer: COMMERCIAL

## 2019-04-05 DIAGNOSIS — R29.3 POSTURE IMBALANCE: ICD-10-CM

## 2019-04-05 PROCEDURE — 97112 NEUROMUSCULAR REEDUCATION: CPT | Mod: PO

## 2019-04-05 PROCEDURE — 97164 PT RE-EVAL EST PLAN CARE: CPT | Mod: PO

## 2019-04-05 NOTE — PROGRESS NOTES
OUTPATIENT PHYSICAL THERAPY EVALUATION        Patient: Dipti Patel [facility] #:  [id number]    Date of treatment: 04/05/2019   Time in: 10:05  Time out: 11:00  Billable time: 55 minutes  POC expiration: 7/5/2019      HISTORY      Dipti is a 48 y.o. female evaluated on 04/05/2019    Physician:  Edith Machado MD   Diagnosis:   Encounter Diagnosis   Name Primary?    Posture imbalance       Treatment ordered: Physical Therapy  Medical History:   Past Medical History:   Diagnosis Date    Allergy     Anxiety     Subclinical hypothyroidism       Surgical History:   Past Surgical History:   Procedure Laterality Date    dental implants      Egg retrieval        Medications:   Current Outpatient Medications   Medication Sig    acetaminophen-codeine 300-30mg (TYLENOL #3) 300-30 mg Tab     B INFANTIS/B ANI/B JET/B BIFID (PROBIOTIC DIGESTIVE CARE ORAL) Take 1 capsule by mouth as needed.    calcium-vitamin D3 (CALCIUM 500 + D) 500 mg(1,250mg) -200 unit per tablet Take 1 tablet by mouth 2 (two) times daily with meals.    cyanocobalamin, vitamin B-12, (VITAMIN B-12) 50 mcg tablet Take 50 mcg by mouth once daily.    estradiol (ESTRACE) 1 MG tablet TK 1 T PO BID    estradiol (ESTRACE) 2 MG tablet TK 1 T PO  BID 12 HOURS APART    ginkgo biloba 40 mg Tab Take 1 tablet by mouth once daily.    hydrocortisone 2.5 % cream Apply topically 2 (two) times daily.    levothyroxine (SYNTHROID) 50 MCG tablet Take 1 tablet (50 mcg total) by mouth once daily.    multivitamin (ONE DAILY MULTIVITAMIN) per tablet Take 1 tablet by mouth once daily.    prenatal vit 55-iron-folic-om3 29-1-430 mg CPKD Take 1 tablet by mouth once daily.    PROGESTERONE IU      No current facility-administered medications for this visit.        Allergies:   Review of patient's allergies indicates:   Allergen Reactions    Aspirin Nausea Only        Precautions: universal    OB/GYN History:  pt reports that she is 22 weeks pregnant.  This  is her first pregnancy.  She reports no history of pelvic pain or sexual pain.      Bladder/Bowel History: urinary frequency and nocturia; constipation when she travels      SUBJECTIVE       History of current complaint: pt reports that she is not having a particular problem- she just wants information about her pelvic floor during pregnancy- what exercises she can do, perineal massage, etc.    Patient's goals for therapy: see above  Pain: Patient reports 0/10, with 0 being the lowest and 10 being the highest.    Sexually active? Yes    Frequency of urination:   Daytime: about every 90 minutes           Nighttime: 1-2 times    Difficulty initiating urine stream: No  Urine stream: strong  Complete emptying: Yes  Bladder leakage: No    Frequency of bowel movements: 1-2 times a day  Difficulty initiating BM: No  Quality/Shape of BM: [place] Stool Chart 4    Types of fluid intake: water with lemon, bouillon, diet sprite, coffee substitute  Diet: she is ovolacto vegetarian; avoids rice and bananas;   Current exercise:swims 3 times per week; yoga twice; weight training and stationary bike.  Walks a lot.      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      OBJECTIVE     ORTHO SCREEN  Posture: WNL  Pelvic alignment: no sign of deviations noted in supine    ABDOMINALS  Scarring: none  Diastasis: 1 fingers above umbilicus  Abdominal strength: Rectus: 3/5     TA: poorly isolated but palpable contraction  Chaperone: declined      VAGINAL PELVIC FLOOR EXAM    EXTERNAL ASSESSMENT  Introitus: WNL  Skin condition: WNL  Scarring: none   Sensation: WNL   Pain:  none  Voluntary contraction: visible lift  Voluntary relaxation: visible drop  Involuntary contraction: visible lift  Bearing down: reflex tightening  Perineal descent: none      *pt did confess to splinting to defecate on occasion.  Education provided on rectocele and proper bearing down technique.       TREATMENT        Neuromuscular  Re-education to develop Coordination for 10 minutes including: bearing down with abdominal release and diaphragmatic breathing.  She struggled to lengthen her pelvic floor with bearing down, even with tactile cues to the perineal body.      Education: instructed on general anatomy/physiology of urinary/bowel system; discussed plan of care with patient; instructed in benefits/risks of treatment; instructed in alternative methods of treatment; instructed in risks of refusing treatment; patient agreed to treatment plan.     Also educated in: anatomy/physiology of pelvic floor, posture/body mechanices, isometric abdominal exercises, perineal stretching/massage, proper bearing down techniques and fluid intake/dietary modifications (see handout)    ASSESSMENT      This is a 48 y.o. female referred to outpatient physical therapy and presents with a medical diagnosis of supervision of high risk pregnancy in the second trimester. She is I with precautions related to exercise, perineal massage guidelines, and tips for prevention of JUDY.  Her goals have been met.     Educational/Spiritual/Cultural needs: visual impairment  Abuse/Neglect: no signs  Nutritional Status: WDWN   Fall Risk: none      PLAN    D/c PT    Therapist: Eula Guallpa, PT, BCB-PMD  4/5/2019

## 2019-04-05 NOTE — PATIENT INSTRUCTIONS
"Home Exercise Program: 04/05/2019    PERINEAL MASSAGE (third trimester)    1. Wash hands thoroughly with antibacterial soap.  2. Lay down comfortably with your back and head well supported by several pillows, or sit on toilet  3. Apply water-based lubricant, coconut oil, or olive oil on your thumbs and perineum.  4. Place one or both thumbs just inside the vagina.  5. Gently press downward, then slowly continue the stretch up both sides of the vaginal opening.   6. The amount of pressure for the stretch may cause discomfort, but not pain.  7. Focus on relaxed breathing and keeping the pelvic floor muscles dropped.   8. Continue for 5-10 minutes, 3-4 times per week.    STOP if there is increased bleeding, an infection, or extreme pain.         Some may prefer their partner to assist them or to perform the massage for them. Your partner needs to use clean hands and gently insert one or two index fingers inside the lower part of the vagina and follow the steps listed above. It is important to tell your partner how much pressure to apply without causing pain.     EXERCISE:  Swimming is excellent; yoga, avoiding prolonged time spent on your back (no more than 5 minutes).      Roll to the left 30 degrees to off load the vena cava (which returns blood to the heart)    SI LOC belt for pelvic girdle stability. (only if you start to have pubic or SI pain)    Home Program: 04/05/2019    YOGA POSES   to improve pelvic floor muscle DROP.  Maintain each position for 1 minute, 1-2 times per day.  Belly breathe throughout, being mindful of the dropping of your pelvic floor muscles.          You can do Kegels along with this.  Just not too many.        TO AVOID DIASTASIS- don't let the baby lead the way!  Hold the baby with your belly muscles.      "The Vagina Whisperer"    Constipation: check with your doctor before using any stimulant type laxative (like Senna or Smooth Move tea)  Plain Colace and Fibercon are usually safe.  STAY " HYDRATED!

## 2019-04-08 ENCOUNTER — PATIENT MESSAGE (OUTPATIENT)
Dept: REHABILITATION | Facility: HOSPITAL | Age: 49
End: 2019-04-08

## 2019-04-16 ENCOUNTER — ROUTINE PRENATAL (OUTPATIENT)
Dept: OBSTETRICS AND GYNECOLOGY | Facility: CLINIC | Age: 49
End: 2019-04-16
Payer: COMMERCIAL

## 2019-04-16 VITALS
DIASTOLIC BLOOD PRESSURE: 62 MMHG | BODY MASS INDEX: 23.13 KG/M2 | SYSTOLIC BLOOD PRESSURE: 100 MMHG | WEIGHT: 152.13 LBS

## 2019-04-16 DIAGNOSIS — Z34.02 ENCOUNTER FOR SUPERVISION OF NORMAL FIRST PREGNANCY IN SECOND TRIMESTER: Primary | ICD-10-CM

## 2019-04-16 PROCEDURE — 99999 PR PBB SHADOW E&M-EST. PATIENT-LVL II: ICD-10-PCS | Mod: PBBFAC,,, | Performed by: ADVANCED PRACTICE MIDWIFE

## 2019-04-16 PROCEDURE — 99213 OFFICE O/P EST LOW 20 MIN: CPT | Mod: S$GLB,,, | Performed by: ADVANCED PRACTICE MIDWIFE

## 2019-04-16 PROCEDURE — 99213 PR OFFICE/OUTPT VISIT, EST, LEVL III, 20-29 MIN: ICD-10-PCS | Mod: S$GLB,,, | Performed by: ADVANCED PRACTICE MIDWIFE

## 2019-04-16 PROCEDURE — 3008F PR BODY MASS INDEX (BMI) DOCUMENTED: ICD-10-PCS | Mod: CPTII,S$GLB,, | Performed by: ADVANCED PRACTICE MIDWIFE

## 2019-04-16 PROCEDURE — 99999 PR PBB SHADOW E&M-EST. PATIENT-LVL II: CPT | Mod: PBBFAC,,, | Performed by: ADVANCED PRACTICE MIDWIFE

## 2019-04-16 PROCEDURE — 3008F BODY MASS INDEX DOCD: CPT | Mod: CPTII,S$GLB,, | Performed by: ADVANCED PRACTICE MIDWIFE

## 2019-04-16 NOTE — PROGRESS NOTES
Doing well today  Alone today  Denies VB, LOF or Ctx  Discussed and ordered 1hr gtt, cbc and tsh  Answered questions about laxatives and constipation  Recommended OTC colace  Going to [place] in a few weeks.

## 2019-04-17 ENCOUNTER — PATIENT MESSAGE (OUTPATIENT)
Dept: OBSTETRICS AND GYNECOLOGY | Facility: CLINIC | Age: 49
End: 2019-04-17

## 2019-04-30 ENCOUNTER — OFFICE VISIT (OUTPATIENT)
Dept: PEDIATRIC CARDIOLOGY | Facility: CLINIC | Age: 49
End: 2019-04-30
Attending: PEDIATRICS
Payer: COMMERCIAL

## 2019-04-30 ENCOUNTER — CLINICAL SUPPORT (OUTPATIENT)
Dept: PEDIATRIC CARDIOLOGY | Facility: CLINIC | Age: 49
End: 2019-04-30
Payer: COMMERCIAL

## 2019-04-30 VITALS
WEIGHT: 151.25 LBS | HEART RATE: 80 BPM | BODY MASS INDEX: 22.92 KG/M2 | DIASTOLIC BLOOD PRESSURE: 60 MMHG | SYSTOLIC BLOOD PRESSURE: 110 MMHG | HEIGHT: 68 IN

## 2019-04-30 DIAGNOSIS — O09.819 PREGNANCY RESULTING FROM IN VITRO FERTILIZATION, ANTEPARTUM: ICD-10-CM

## 2019-04-30 DIAGNOSIS — O09.812 HIGH RISK PREGNANCY DUE TO ASSISTED REPRODUCTIVE TECHNOLOGY IN SECOND TRIMESTER: Primary | ICD-10-CM

## 2019-04-30 PROCEDURE — 99999 PR PBB SHADOW E&M-EST. PATIENT-LVL I: CPT | Mod: PBBFAC,,,

## 2019-04-30 PROCEDURE — 76825 PR  SO2 FETAL HEART: ICD-10-PCS | Mod: S$GLB,,, | Performed by: PEDIATRICS

## 2019-04-30 PROCEDURE — 76827 ECHO EXAM OF FETAL HEART: CPT | Mod: S$GLB,,, | Performed by: PEDIATRICS

## 2019-04-30 PROCEDURE — 99203 OFFICE O/P NEW LOW 30 MIN: CPT | Mod: 25,S$GLB,, | Performed by: PEDIATRICS

## 2019-04-30 PROCEDURE — 93325 PR DOPPLER COLOR FLOW VELOCITY MAP: ICD-10-PCS | Mod: S$GLB,,, | Performed by: PEDIATRICS

## 2019-04-30 PROCEDURE — 99203 PR OFFICE/OUTPT VISIT, NEW, LEVL III, 30-44 MIN: ICD-10-PCS | Mod: 25,S$GLB,, | Performed by: PEDIATRICS

## 2019-04-30 PROCEDURE — 99999 PR PBB SHADOW E&M-EST. PATIENT-LVL I: ICD-10-PCS | Mod: PBBFAC,,,

## 2019-04-30 PROCEDURE — 93325 DOPPLER ECHO COLOR FLOW MAPG: CPT | Mod: S$GLB,,, | Performed by: PEDIATRICS

## 2019-04-30 PROCEDURE — 76827 PR  SO2 FETAL HEART DOPPLER: ICD-10-PCS | Mod: S$GLB,,, | Performed by: PEDIATRICS

## 2019-04-30 PROCEDURE — 99999 PR PBB SHADOW E&M-EST. PATIENT-LVL III: CPT | Mod: PBBFAC,,, | Performed by: PEDIATRICS

## 2019-04-30 PROCEDURE — 76825 ECHO EXAM OF FETAL HEART: CPT | Mod: S$GLB,,, | Performed by: PEDIATRICS

## 2019-04-30 PROCEDURE — 99999 PR PBB SHADOW E&M-EST. PATIENT-LVL III: ICD-10-PCS | Mod: PBBFAC,,, | Performed by: PEDIATRICS

## 2019-04-30 NOTE — PROGRESS NOTES
[facility] Pediatric Cardiology [place] [address] Fetal Cardiology Clinic    Today, I had the pleasure of evaluating Dipti Pacheco who is now 48 y.o. and carrying her first pregnancy at 26 1/7 weeks gestation with an FENG of 2019. She was referred for evaluation of the fetal heart due IVF.      She is carrying a male fetus.  Pregnancy thus has been uncomplicated.     Obstetric History:    .  Her OB history is otherwise unremarkable.     Past Medical History:   Diagnosis Date    Allergy     Anxiety     Subclinical hypothyroidism          Current Outpatient Medications:     B INFANTIS/B ANI/B JET/B BIFID (PROBIOTIC DIGESTIVE CARE ORAL), Take 1 capsule by mouth as needed., Disp: , Rfl:     calcium-vitamin D3 (CALCIUM 500 + D) 500 mg(1,250mg) -200 unit per tablet, Take 1 tablet by mouth 2 (two) times daily with meals., Disp: , Rfl:     cyanocobalamin, vitamin B-12, (VITAMIN B-12) 50 mcg tablet, Take 50 mcg by mouth once daily., Disp: , Rfl:     hydrocortisone 2.5 % cream, Apply topically 2 (two) times daily., Disp: 28 g, Rfl: 3    levothyroxine (SYNTHROID) 50 MCG tablet, Take 1 tablet (50 mcg total) by mouth once daily., Disp: 30 tablet, Rfl: 11    multivitamin (ONE DAILY MULTIVITAMIN) per tablet, Take 1 tablet by mouth once daily., Disp: , Rfl:     ginkgo biloba 40 mg Tab, Take 1 tablet by mouth once daily., Disp: , Rfl:     prenatal vit 55-iron-folic-om3 29-1-430 mg CPKD, Take 1 tablet by mouth once daily., Disp: , Rfl:      Review of patient's allergies indicates:   Allergen Reactions    Aspirin Nausea Only       Family History: Negative for congenital heart disease, genetic syndromes, multiple miscarriages or other congenital anomalies.    Social History: Ms. Dipti Pacheco is single. IVF fetus, sperm donor.     FETAL ECHOCARDIOGRAM (summary):  Normal fetal echocardiogram.   (Full report in electronic medical record)    Impression:  Single active male fetus at 26 1/7 wga.  Normal fetal  echocardiogram.      Todays fetal echocardiogram is normal, within the limitations of fetal echocardiography.  I discussed with her that fetal echocardiography is insufficiently sensitive to rule out all septal defects, anomalies of pulmonary and systemic veins, arch anomalies, and some valvar abnormalities, nor can it ensure that the ductus arteriosus and foramen ovale will spontaneously close.     Recommendations:  Location, timing, and mode of delivery will be determined by the obstetrical team.  She does not require further follow-up in the fetal echocardiography clinic, but I would be happy to see her again if additional questions or concerns arise.    Should there be any concerns about the baby's heart after birth, a post- echocardiogram and cardiology consultation are recommended.         Lia Nevarez MD, MSCI  Pediatric Cardiology  Pediatric Echocardiography, Fetal Echocardiography, Cardiac MRI  [facility] [address] [place]  [address]  Phone [phone], Fax [phone]    The above information was discussed in detail including the use of diagrams, with 30 minutes of total face to face time, with greater than 50% with counseling and coordination of care.  The discussion of the diagnosis and treatment options is as described above.

## 2019-04-30 NOTE — LETTER
April 30, 2019        Eidth Machado MD  [address] [facility] [address]             [facility] Pediatric Cardiology [facility] [address]  [address], [address]  [facility] [address]  Phone: [phone]  Fax: [phone]   Patient: Dipti Pacheco   MR Number: [id number]   YOB: 1970   Date of Visit: 4/30/2019       Dear Dr. Machado:    Thank you for referring Dipti Pacheco to me for evaluation. Below are the relevant portions of my assessment and plan of care.            If you have questions, please do not hesitate to call me. I look forward to following Dipti along with you.    Sincerely,      MD STEPHANIE Cabrera CNM

## 2019-05-29 ENCOUNTER — LAB VISIT (OUTPATIENT)
Dept: LAB | Facility: OTHER | Age: 49
End: 2019-05-29
Payer: COMMERCIAL

## 2019-05-29 ENCOUNTER — ROUTINE PRENATAL (OUTPATIENT)
Dept: OBSTETRICS AND GYNECOLOGY | Facility: CLINIC | Age: 49
End: 2019-05-29
Payer: COMMERCIAL

## 2019-05-29 VITALS
DIASTOLIC BLOOD PRESSURE: 64 MMHG | SYSTOLIC BLOOD PRESSURE: 100 MMHG | WEIGHT: 153.56 LBS | BODY MASS INDEX: 23.27 KG/M2

## 2019-05-29 DIAGNOSIS — O09.513 ELDERLY PRIMIGRAVIDA IN THIRD TRIMESTER: Primary | ICD-10-CM

## 2019-05-29 DIAGNOSIS — Z34.02 ENCOUNTER FOR SUPERVISION OF NORMAL FIRST PREGNANCY IN SECOND TRIMESTER: ICD-10-CM

## 2019-05-29 DIAGNOSIS — O09.91 PREGNANCY, HIGH-RISK, FIRST TRIMESTER: ICD-10-CM

## 2019-05-29 LAB
ABO + RH BLD: NORMAL
BASOPHILS # BLD AUTO: 0.04 K/UL (ref 0–0.2)
BASOPHILS NFR BLD: 0.4 % (ref 0–1.9)
BLD GP AB SCN CELLS X3 SERPL QL: NORMAL
DIFFERENTIAL METHOD: ABNORMAL
EOSINOPHIL # BLD AUTO: 0.1 K/UL (ref 0–0.5)
EOSINOPHIL NFR BLD: 1.1 % (ref 0–8)
ERYTHROCYTE [DISTWIDTH] IN BLOOD BY AUTOMATED COUNT: 12.7 % (ref 11.5–14.5)
GLUCOSE SERPL-MCNC: 146 MG/DL (ref 70–140)
HCT VFR BLD AUTO: 33.3 % (ref 37–48.5)
HGB BLD-MCNC: 11.1 G/DL (ref 12–16)
LYMPHOCYTES # BLD AUTO: 1.8 K/UL (ref 1–4.8)
LYMPHOCYTES NFR BLD: 16.4 % (ref 18–48)
MCH RBC QN AUTO: 31.6 PG (ref 27–31)
MCHC RBC AUTO-ENTMCNC: 33.3 G/DL (ref 32–36)
MCV RBC AUTO: 95 FL (ref 82–98)
MONOCYTES # BLD AUTO: 0.8 K/UL (ref 0.3–1)
MONOCYTES NFR BLD: 7.1 % (ref 4–15)
NEUTROPHILS # BLD AUTO: 8 K/UL (ref 1.8–7.7)
NEUTROPHILS NFR BLD: 74.3 % (ref 38–73)
PLATELET # BLD AUTO: 294 K/UL (ref 150–350)
PMV BLD AUTO: 9.3 FL (ref 9.2–12.9)
RBC # BLD AUTO: 3.51 M/UL (ref 4–5.4)
TSH SERPL DL<=0.005 MIU/L-ACNC: 1.31 UIU/ML (ref 0.4–4)
WBC # BLD AUTO: 10.71 K/UL (ref 3.9–12.7)

## 2019-05-29 PROCEDURE — 82950 GLUCOSE TEST: CPT

## 2019-05-29 PROCEDURE — 86850 RBC ANTIBODY SCREEN: CPT

## 2019-05-29 PROCEDURE — 99999 PR PBB SHADOW E&M-EST. PATIENT-LVL II: ICD-10-PCS | Mod: PBBFAC,,, | Performed by: ADVANCED PRACTICE MIDWIFE

## 2019-05-29 PROCEDURE — 0502F SUBSEQUENT PRENATAL CARE: CPT | Mod: CPTII,S$GLB,, | Performed by: ADVANCED PRACTICE MIDWIFE

## 2019-05-29 PROCEDURE — 0502F PR SUBSEQUENT PRENATAL CARE: ICD-10-PCS | Mod: CPTII,S$GLB,, | Performed by: ADVANCED PRACTICE MIDWIFE

## 2019-05-29 PROCEDURE — 99999 PR PBB SHADOW E&M-EST. PATIENT-LVL II: CPT | Mod: PBBFAC,,, | Performed by: ADVANCED PRACTICE MIDWIFE

## 2019-05-29 PROCEDURE — 85025 COMPLETE CBC W/AUTO DIFF WBC: CPT

## 2019-05-29 PROCEDURE — 84443 ASSAY THYROID STIM HORMONE: CPT

## 2019-05-29 RX ORDER — ASPIRIN 81 MG/1
81 TABLET ORAL DAILY
Status: ON HOLD | COMMUNITY
End: 2019-07-18

## 2019-05-29 NOTE — PROGRESS NOTES
48 y.o. female  at 30w2d  Pt doing well - had bronchitis and double ear infection while in [place]. Was seen and treated and feeling much better.   Reports + FM, denies VB, LOF or CTX  Doing well without concerns  TW lbs   28wk labs today -  No type and screen on file. Ordered lab to make sure they draw today (per order from January).   Consult ordered today for AMA along with prenatal testing biweekly beginning @ 32 weeks.   Reviewed warning signs, normal FKCs,  labor precautions and how/when to call.  RTC x 2 wks, call or present sooner prn.   Birth Center Risk Assessment: 0- Meets birth center guidelines    0- CNM management in ABC  1- CNM management on L&D  2- Consultation with OB to develop  plan of care  3- Collaborative CNM/OB management with delivery on L&D  4- Permanent referral of care to MD

## 2019-05-30 ENCOUNTER — TELEPHONE (OUTPATIENT)
Dept: OBSTETRICS AND GYNECOLOGY | Facility: CLINIC | Age: 49
End: 2019-05-30

## 2019-05-30 ENCOUNTER — PATIENT MESSAGE (OUTPATIENT)
Dept: OBSTETRICS AND GYNECOLOGY | Facility: CLINIC | Age: 49
End: 2019-05-30

## 2019-05-30 DIAGNOSIS — E03.9 HYPOTHYROIDISM AFFECTING PREGNANCY IN THIRD TRIMESTER: ICD-10-CM

## 2019-05-30 DIAGNOSIS — O99.283 HYPOTHYROIDISM AFFECTING PREGNANCY IN THIRD TRIMESTER: ICD-10-CM

## 2019-05-30 DIAGNOSIS — R73.09 ELEVATED GLUCOSE TOLERANCE TEST: Primary | ICD-10-CM

## 2019-05-30 RX ORDER — LEVOTHYROXINE SODIUM 50 UG/1
50 TABLET ORAL
Qty: 30 TABLET | Refills: 11 | Status: SHIPPED | OUTPATIENT
Start: 2019-05-30 | End: 2019-08-06

## 2019-05-30 NOTE — TELEPHONE ENCOUNTER
Called to review lab results with pt. 3 hour GTT ordered. Synthroid refills placed to pt's pharmacy of choice.     Dang MCCLURE

## 2019-06-04 ENCOUNTER — LAB VISIT (OUTPATIENT)
Dept: LAB | Facility: OTHER | Age: 49
End: 2019-06-04
Payer: COMMERCIAL

## 2019-06-04 DIAGNOSIS — R73.09 ELEVATED GLUCOSE TOLERANCE TEST: ICD-10-CM

## 2019-06-04 LAB
GLUCOSE SERPL-MCNC: 110 MG/DL
GLUCOSE SERPL-MCNC: 58 MG/DL
GLUCOSE SERPL-MCNC: 73 MG/DL (ref 70–110)
GLUCOSE SERPL-MCNC: 92 MG/DL

## 2019-06-04 PROCEDURE — 36415 COLL VENOUS BLD VENIPUNCTURE: CPT

## 2019-06-04 PROCEDURE — 82952 GTT-ADDED SAMPLES: CPT

## 2019-06-04 PROCEDURE — 82951 GLUCOSE TOLERANCE TEST (GTT): CPT

## 2019-06-10 ENCOUNTER — HOSPITAL ENCOUNTER (OUTPATIENT)
Dept: PERINATAL CARE | Facility: OTHER | Age: 49
Discharge: HOME | End: 2019-06-10
Attending: STUDENT IN AN ORGANIZED HEALTH CARE EDUCATION/TRAINING PROGRAM
Payer: COMMERCIAL

## 2019-06-10 ENCOUNTER — ROUTINE PRENATAL (OUTPATIENT)
Dept: OBSTETRICS AND GYNECOLOGY | Facility: CLINIC | Age: 49
End: 2019-06-10
Payer: COMMERCIAL

## 2019-06-10 VITALS
SYSTOLIC BLOOD PRESSURE: 100 MMHG | BODY MASS INDEX: 24.12 KG/M2 | DIASTOLIC BLOOD PRESSURE: 60 MMHG | WEIGHT: 159.19 LBS

## 2019-06-10 DIAGNOSIS — Z34.93 PREGNANT AND NOT YET DELIVERED IN THIRD TRIMESTER: Primary | ICD-10-CM

## 2019-06-10 DIAGNOSIS — O09.513 ELDERLY PRIMIGRAVIDA IN THIRD TRIMESTER: ICD-10-CM

## 2019-06-10 PROCEDURE — 59025 FETAL NON-STRESS TEST: CPT | Mod: 26,,, | Performed by: PEDIATRICS

## 2019-06-10 PROCEDURE — 59025 FETAL NON-STRESS TEST: CPT

## 2019-06-10 PROCEDURE — 59025 PR FETAL 2N-STRESS TEST: ICD-10-PCS | Mod: 26,,, | Performed by: PEDIATRICS

## 2019-06-10 PROCEDURE — 0502F PR SUBSEQUENT PRENATAL CARE: ICD-10-PCS | Mod: CPTII,S$GLB,, | Performed by: ADVANCED PRACTICE MIDWIFE

## 2019-06-10 PROCEDURE — 99999 PR PBB SHADOW E&M-EST. PATIENT-LVL III: ICD-10-PCS | Mod: PBBFAC,,, | Performed by: ADVANCED PRACTICE MIDWIFE

## 2019-06-10 PROCEDURE — 0502F SUBSEQUENT PRENATAL CARE: CPT | Mod: CPTII,S$GLB,, | Performed by: ADVANCED PRACTICE MIDWIFE

## 2019-06-10 PROCEDURE — 99999 PR PBB SHADOW E&M-EST. PATIENT-LVL III: CPT | Mod: PBBFAC,,, | Performed by: ADVANCED PRACTICE MIDWIFE

## 2019-06-10 NOTE — PROGRESS NOTES
Indication  ========    IVF, AMA    History  ======    Previous Outcomes   1  Para 0  Risk Factors  Details: IVF (egg donor 25 y/o and sperm donor 31 y/o)  AMA    Pregnancy  =========    [place] pregnancy. Number of fetuses: 1    Dating  ======    Cycle: regular cycle  Conception: IVF  Embryo transfer on: 2018  IVF / ET 5 d  GA by IVF / ET 31 w + 6 d  FENG by IVF / ET: 2019  Assigned: Dating performed on 2019, based on the IVF / ET date  Assigned GA 31 w + 6 d  Assigned FENG: 2019  Pregnancy length 280 d    Non Stress Test  =============    NST interpretation: reactive. Test duration 25 min. Baseline  bpm. Baseline variability: moderate. Accelerations: present.  Decelerations: absent. Uterine activity: absent. Acoustic stimulation: no  reports fetal movement, denies ctx, vag bleeding or loss of fluid. [facility] reviewed    Impression  =========    NST R      Recommendation  ==============    Continue fetal surveillance as previously outlined

## 2019-06-10 NOTE — PROGRESS NOTES
48 y.o. female  at 32w0d   Reports + FM, denies VB, LOF or CTX  Doing well - restless legs at night. Discussed interventions.   TW lbs   Reviewed 28wk lab results (A POS)  Rhogam not indicated  Tdap discussed - pt wants to research more and will decide prior to next visit  Prenatal testing scheduled   Reviewed upcoming 36wk labs  Reviewed warning signs, normal FKCs,  labor precautions and how/when to call.  RTC x 2 wks, call or present sooner prn.     Birth Center Risk Assessment: 0- Meets birth center guidelines    0- CNM management in ABC  1- CNM management on L&D  2- Consultation with OB to develop  plan of care  3- Collaborative CNM/OB management with delivery on L&D  4- Permanent referral of care to MD

## 2019-06-11 ENCOUNTER — PROCEDURE VISIT (OUTPATIENT)
Dept: MATERNAL FETAL MEDICINE | Facility: CLINIC | Age: 49
End: 2019-06-11
Payer: COMMERCIAL

## 2019-06-11 DIAGNOSIS — O09.813 PREGNANCY RESULTING FROM IN VITRO FERTILIZATION IN THIRD TRIMESTER: ICD-10-CM

## 2019-06-11 DIAGNOSIS — O09.519 ADVANCED MATERNAL AGE, PRIMIGRAVIDA, ANTEPARTUM: ICD-10-CM

## 2019-06-11 DIAGNOSIS — O09.819 PREGNANCY RESULTING FROM IN VITRO FERTILIZATION, ANTEPARTUM: ICD-10-CM

## 2019-06-11 DIAGNOSIS — Z36.89 ENCOUNTER FOR ULTRASOUND TO CHECK FETAL GROWTH: ICD-10-CM

## 2019-06-11 PROCEDURE — 76816 PR  US,PREGNANT UTERUS,F/U,TRANSABD APP: ICD-10-PCS | Mod: S$GLB,,, | Performed by: OBSTETRICS & GYNECOLOGY

## 2019-06-11 PROCEDURE — 99499 NO LOS: ICD-10-PCS | Mod: S$GLB,,, | Performed by: OBSTETRICS & GYNECOLOGY

## 2019-06-11 PROCEDURE — 76819 PR US, OB, FETAL BIOPHYSICAL, W/O NST: ICD-10-PCS | Mod: S$GLB,,, | Performed by: OBSTETRICS & GYNECOLOGY

## 2019-06-11 PROCEDURE — 99499 UNLISTED E&M SERVICE: CPT | Mod: S$GLB,,, | Performed by: OBSTETRICS & GYNECOLOGY

## 2019-06-11 PROCEDURE — 76816 OB US FOLLOW-UP PER FETUS: CPT | Mod: S$GLB,,, | Performed by: OBSTETRICS & GYNECOLOGY

## 2019-06-11 PROCEDURE — 76819 FETAL BIOPHYS PROFIL W/O NST: CPT | Mod: S$GLB,,, | Performed by: OBSTETRICS & GYNECOLOGY

## 2019-06-13 DIAGNOSIS — Z36.89 ENCOUNTER FOR ULTRASOUND TO CHECK FETAL GROWTH: Primary | ICD-10-CM

## 2019-06-14 ENCOUNTER — HOSPITAL ENCOUNTER (OUTPATIENT)
Dept: PERINATAL CARE | Facility: OTHER | Age: 49
Discharge: HOME | End: 2019-06-14
Attending: HOSPITALIST
Payer: COMMERCIAL

## 2019-06-14 DIAGNOSIS — O09.513 ELDERLY PRIMIGRAVIDA IN THIRD TRIMESTER: ICD-10-CM

## 2019-06-14 PROCEDURE — 59025 FETAL NON-STRESS TEST: CPT

## 2019-06-14 PROCEDURE — 59025 PR FETAL 2N-STRESS TEST: ICD-10-PCS | Mod: 26,,, | Performed by: OBSTETRICS & GYNECOLOGY

## 2019-06-14 PROCEDURE — 59025 FETAL NON-STRESS TEST: CPT | Mod: 26,,, | Performed by: OBSTETRICS & GYNECOLOGY

## 2019-06-18 ENCOUNTER — HOSPITAL ENCOUNTER (OUTPATIENT)
Dept: PERINATAL CARE | Facility: OTHER | Age: 49
Discharge: HOME | End: 2019-06-18
Attending: NURSE PRACTITIONER
Payer: COMMERCIAL

## 2019-06-18 DIAGNOSIS — O09.513 ELDERLY PRIMIGRAVIDA IN THIRD TRIMESTER: ICD-10-CM

## 2019-06-18 PROCEDURE — 59025 FETAL NON-STRESS TEST: CPT

## 2019-06-18 PROCEDURE — 59025 FETAL NON-STRESS TEST: CPT | Mod: 26,,, | Performed by: OBSTETRICS & GYNECOLOGY

## 2019-06-18 PROCEDURE — 76815 PR  US,PREGNANT UTERUS,LIMITED, 1/> FETUSES: ICD-10-PCS | Mod: 26,,, | Performed by: OBSTETRICS & GYNECOLOGY

## 2019-06-18 PROCEDURE — 76815 OB US LIMITED FETUS(S): CPT | Mod: 26,,, | Performed by: OBSTETRICS & GYNECOLOGY

## 2019-06-18 PROCEDURE — 76815 OB US LIMITED FETUS(S): CPT

## 2019-06-18 PROCEDURE — 59025 PR FETAL 2N-STRESS TEST: ICD-10-PCS | Mod: 26,,, | Performed by: OBSTETRICS & GYNECOLOGY

## 2019-06-21 ENCOUNTER — HOSPITAL ENCOUNTER (OUTPATIENT)
Dept: PERINATAL CARE | Facility: OTHER | Age: 49
Discharge: HOME | End: 2019-06-21
Payer: COMMERCIAL

## 2019-06-21 DIAGNOSIS — O09.513 ELDERLY PRIMIGRAVIDA IN THIRD TRIMESTER: ICD-10-CM

## 2019-06-21 PROCEDURE — 59025 FETAL NON-STRESS TEST: CPT | Mod: 26,,, | Performed by: OBSTETRICS & GYNECOLOGY

## 2019-06-21 PROCEDURE — 59025 PR FETAL 2N-STRESS TEST: ICD-10-PCS | Mod: 26,,, | Performed by: OBSTETRICS & GYNECOLOGY

## 2019-06-21 PROCEDURE — 59025 FETAL NON-STRESS TEST: CPT

## 2019-06-25 ENCOUNTER — ROUTINE PRENATAL (OUTPATIENT)
Dept: OBSTETRICS AND GYNECOLOGY | Facility: CLINIC | Age: 49
End: 2019-06-25
Payer: COMMERCIAL

## 2019-06-25 ENCOUNTER — HOSPITAL ENCOUNTER (OUTPATIENT)
Dept: PERINATAL CARE | Facility: OTHER | Age: 49
Discharge: HOME | End: 2019-06-25
Attending: ADVANCED PRACTICE MIDWIFE
Payer: COMMERCIAL

## 2019-06-25 ENCOUNTER — PATIENT MESSAGE (OUTPATIENT)
Dept: OBSTETRICS AND GYNECOLOGY | Facility: CLINIC | Age: 49
End: 2019-06-25

## 2019-06-25 VITALS
DIASTOLIC BLOOD PRESSURE: 56 MMHG | SYSTOLIC BLOOD PRESSURE: 100 MMHG | BODY MASS INDEX: 25.38 KG/M2 | WEIGHT: 167.44 LBS

## 2019-06-25 DIAGNOSIS — O09.513 ELDERLY PRIMIGRAVIDA IN THIRD TRIMESTER: ICD-10-CM

## 2019-06-25 DIAGNOSIS — Z86.19 HISTORY OF HERPES GENITALIS: ICD-10-CM

## 2019-06-25 DIAGNOSIS — Z34.03 ENCOUNTER FOR SUPERVISION OF NORMAL FIRST PREGNANCY IN THIRD TRIMESTER: ICD-10-CM

## 2019-06-25 DIAGNOSIS — Z3A.34 34 WEEKS GESTATION OF PREGNANCY: Primary | ICD-10-CM

## 2019-06-25 PROCEDURE — 76815 PR  US,PREGNANT UTERUS,LIMITED, 1/> FETUSES: ICD-10-PCS | Mod: 26,,, | Performed by: OBSTETRICS & GYNECOLOGY

## 2019-06-25 PROCEDURE — 76815 OB US LIMITED FETUS(S): CPT | Mod: 26,,, | Performed by: OBSTETRICS & GYNECOLOGY

## 2019-06-25 PROCEDURE — 0502F PR SUBSEQUENT PRENATAL CARE: ICD-10-PCS | Mod: CPTII,S$GLB,, | Performed by: ADVANCED PRACTICE MIDWIFE

## 2019-06-25 PROCEDURE — 59025 PR FETAL 2N-STRESS TEST: ICD-10-PCS | Mod: 26,,, | Performed by: OBSTETRICS & GYNECOLOGY

## 2019-06-25 PROCEDURE — 0502F SUBSEQUENT PRENATAL CARE: CPT | Mod: CPTII,S$GLB,, | Performed by: ADVANCED PRACTICE MIDWIFE

## 2019-06-25 PROCEDURE — 76815 OB US LIMITED FETUS(S): CPT

## 2019-06-25 PROCEDURE — 59025 FETAL NON-STRESS TEST: CPT | Mod: 26,,, | Performed by: OBSTETRICS & GYNECOLOGY

## 2019-06-25 PROCEDURE — 99999 PR PBB SHADOW E&M-EST. PATIENT-LVL III: ICD-10-PCS | Mod: PBBFAC,,, | Performed by: ADVANCED PRACTICE MIDWIFE

## 2019-06-25 PROCEDURE — 59025 FETAL NON-STRESS TEST: CPT

## 2019-06-25 PROCEDURE — 99999 PR PBB SHADOW E&M-EST. PATIENT-LVL III: CPT | Mod: PBBFAC,,, | Performed by: ADVANCED PRACTICE MIDWIFE

## 2019-06-25 RX ORDER — VALACYCLOVIR HYDROCHLORIDE 500 MG/1
500 TABLET, FILM COATED ORAL DAILY
Qty: 30 TABLET | Refills: 1 | Status: ON HOLD | OUTPATIENT
Start: 2019-06-25 | End: 2019-07-20 | Stop reason: HOSPADM

## 2019-06-28 ENCOUNTER — HOSPITAL ENCOUNTER (OUTPATIENT)
Dept: PERINATAL CARE | Facility: OTHER | Age: 49
Discharge: HOME | End: 2019-06-28
Attending: OBSTETRICS & GYNECOLOGY
Payer: COMMERCIAL

## 2019-06-28 DIAGNOSIS — O09.513 ELDERLY PRIMIGRAVIDA IN THIRD TRIMESTER: ICD-10-CM

## 2019-06-28 PROCEDURE — 59025 PR FETAL 2N-STRESS TEST: ICD-10-PCS | Mod: 26,,, | Performed by: PEDIATRICS

## 2019-06-28 PROCEDURE — 59025 FETAL NON-STRESS TEST: CPT

## 2019-06-28 PROCEDURE — 59025 FETAL NON-STRESS TEST: CPT | Mod: 26,,, | Performed by: PEDIATRICS

## 2019-06-29 NOTE — PROGRESS NOTES
Indication  ========    IVF, Advanced Maternal Age    History  ======    Previous Outcomes   1  Para 0  Risk Factors  Details: IVF (egg donor 25 y/o and sperm donor 33 y/o)  AMA    Maternal Assessment  =================    BP syst 105 mmHg  BP diast 63 mmHg  Maternal assessment other: DDunn, RN    Pregnancy  =========    [place] pregnancy. Number of fetuses: 1    Dating  ======    Cycle: regular cycle  Conception: IVF  Embryo transfer on: 2018  IVF / ET 5 d  GA by IVF / ET 34 w + 3 d  FENG by IVF / ET: 2019  Assigned: Dating performed on 2019, based on the IVF / ET date  Assigned GA 34 w + 3 d  Assigned FENG: 2019  Pregnancy length 280 d    Non Stress Test  =============    NST interpretation: reactive. Test duration 20 min. Baseline  bpm. Baseline variability: moderate. Accelerations: present.  Decelerations: absent. Uterine activity: absent. Acoustic stimulation: no  reports + fetal movement, denies ctx, vag bleeding or loss of fluid. C reviewed        Impression  =========    NST R.          Recommendation  ==============    Continue fetal surveillance as previously outlined

## 2019-06-29 NOTE — PROGRESS NOTES
49 y.o. female  at 34w5d   Reports + FM, denies VB, LOF or CTX  Doing well without concerns   TW lbs   Reviewed 28wk lab results (A POS)   Tdap- discussed and scheduled  Reviewed warning signs, normal FKCs,  labor precautions and how/when to call.  RTC x 2 wks, call or present sooner prn.   Discussed need for rubella as not in initial labs- will draw with 36 week labs.  Pt considering havin circumcision done per pediatric urologist- advised will follow up with her for instructions on how to arrange.

## 2019-06-30 ENCOUNTER — PATIENT MESSAGE (OUTPATIENT)
Dept: OBSTETRICS AND GYNECOLOGY | Facility: CLINIC | Age: 49
End: 2019-06-30

## 2019-07-02 ENCOUNTER — HOSPITAL ENCOUNTER (OUTPATIENT)
Dept: PERINATAL CARE | Facility: OTHER | Age: 49
Discharge: HOME | End: 2019-07-02
Attending: OBSTETRICS & GYNECOLOGY
Payer: COMMERCIAL

## 2019-07-02 DIAGNOSIS — O09.513 ELDERLY PRIMIGRAVIDA IN THIRD TRIMESTER: ICD-10-CM

## 2019-07-02 PROCEDURE — 76815 PR  US,PREGNANT UTERUS,LIMITED, 1/> FETUSES: ICD-10-PCS | Mod: 26,,, | Performed by: PEDIATRICS

## 2019-07-02 PROCEDURE — 76815 OB US LIMITED FETUS(S): CPT

## 2019-07-02 PROCEDURE — 76815 OB US LIMITED FETUS(S): CPT | Mod: 26,,, | Performed by: PEDIATRICS

## 2019-07-02 PROCEDURE — 59025 PR FETAL 2N-STRESS TEST: ICD-10-PCS | Mod: 26,,, | Performed by: PEDIATRICS

## 2019-07-02 PROCEDURE — 59025 FETAL NON-STRESS TEST: CPT

## 2019-07-02 PROCEDURE — 59025 FETAL NON-STRESS TEST: CPT | Mod: 26,,, | Performed by: PEDIATRICS

## 2019-07-02 NOTE — PROGRESS NOTES
Indication  ========    IVF/ Advanced Maternal Age    History  ======    Previous Outcomes   1  Para 0  Risk Factors  Details: IVF (egg donor 25 y/o and sperm donor 31 y/o)  AMA    Maternal Assessment  =================    BP syst 108 mmHg  BP diast 66 mmHg  Maternal assessment other: DDunn, RN    Method  ======    Transabdominal ultrasound examination. 2D Color Doppler, [place] S8. View: Sufficient    Pregnancy  =========    [place] pregnancy. Number of fetuses: 1    Dating  ======    Cycle: regular cycle  Conception: IVF  Embryo transfer on: 2018  IVF / ET 5 d  GA by IVF / ET 35 w + 0 d  FENG by IVF / ET: 2019  Assigned: Dating performed on 2019, based on the IVF / ET date  Assigned GA 35 w + 0 d  Assigned FENG: 2019  Pregnancy length 280 d    General Evaluation  ==============    Cardiac activity present.  bpm.  Fetal movements present.  Presentation cephalic.  Placenta anterior, Fundal.    Non Stress Test  =============    NST interpretation: reactive. Test duration 20 min. Baseline  bpm. Baseline variability: moderate. Accelerations: present.  Decelerations: present. Uterine activity: present. Acoustic stimulation: no  reports + fetal movement, denies ctx, vag bleeding or loss of fluid. [facility] reviewed    Amniotic Fluid Assessment  =====================    Amount of AF: normal amount  MVP 4.8 cm          Impression  =========    R NST  MVP WNL          Recommendation  ==============    Continue fetal surveillance as previously outlined

## 2019-07-09 ENCOUNTER — ROUTINE PRENATAL (OUTPATIENT)
Dept: OBSTETRICS AND GYNECOLOGY | Facility: CLINIC | Age: 49
End: 2019-07-09
Payer: COMMERCIAL

## 2019-07-09 ENCOUNTER — HOSPITAL ENCOUNTER (OUTPATIENT)
Dept: PERINATAL CARE | Facility: OTHER | Age: 49
Discharge: HOME | End: 2019-07-09
Attending: OPHTHALMOLOGY
Payer: COMMERCIAL

## 2019-07-09 VITALS — WEIGHT: 172.81 LBS | BODY MASS INDEX: 26.2 KG/M2 | SYSTOLIC BLOOD PRESSURE: 102 MMHG | DIASTOLIC BLOOD PRESSURE: 64 MMHG

## 2019-07-09 DIAGNOSIS — O09.513 ELDERLY PRIMIGRAVIDA IN THIRD TRIMESTER: ICD-10-CM

## 2019-07-09 DIAGNOSIS — Z34.03 ENCOUNTER FOR SUPERVISION OF NORMAL FIRST PREGNANCY IN THIRD TRIMESTER: Primary | ICD-10-CM

## 2019-07-09 PROCEDURE — 76815 OB US LIMITED FETUS(S): CPT | Mod: 26,,, | Performed by: OBSTETRICS & GYNECOLOGY

## 2019-07-09 PROCEDURE — 59025 FETAL NON-STRESS TEST: CPT

## 2019-07-09 PROCEDURE — 59025 FETAL NON-STRESS TEST: CPT | Mod: 26,,, | Performed by: OBSTETRICS & GYNECOLOGY

## 2019-07-09 PROCEDURE — 0502F SUBSEQUENT PRENATAL CARE: CPT | Mod: CPTII,S$GLB,, | Performed by: ADVANCED PRACTICE MIDWIFE

## 2019-07-09 PROCEDURE — 59025 PR FETAL 2N-STRESS TEST: ICD-10-PCS | Mod: 26,,, | Performed by: OBSTETRICS & GYNECOLOGY

## 2019-07-09 PROCEDURE — 76815 PR  US,PREGNANT UTERUS,LIMITED, 1/> FETUSES: ICD-10-PCS | Mod: 26,,, | Performed by: OBSTETRICS & GYNECOLOGY

## 2019-07-09 PROCEDURE — 99999 PR PBB SHADOW E&M-EST. PATIENT-LVL III: ICD-10-PCS | Mod: PBBFAC,,, | Performed by: ADVANCED PRACTICE MIDWIFE

## 2019-07-09 PROCEDURE — 76815 OB US LIMITED FETUS(S): CPT

## 2019-07-09 PROCEDURE — 99999 PR PBB SHADOW E&M-EST. PATIENT-LVL III: CPT | Mod: PBBFAC,,, | Performed by: ADVANCED PRACTICE MIDWIFE

## 2019-07-09 PROCEDURE — 0502F PR SUBSEQUENT PRENATAL CARE: ICD-10-PCS | Mod: CPTII,S$GLB,, | Performed by: ADVANCED PRACTICE MIDWIFE

## 2019-07-09 NOTE — PROGRESS NOTES
Reviewed Birth plan- pt will bring typed version next visit.  Pt cannot stay for long visit today- will do labs and GBS at next visit. Needs rubella drawn.  Declines Hep B vaccine for infant. Considering oral Vit K.  Discussed begin Valtrex now.  Has begun prenatal testing sec to AMA.

## 2019-07-16 ENCOUNTER — ROUTINE PRENATAL (OUTPATIENT)
Dept: OBSTETRICS AND GYNECOLOGY | Facility: CLINIC | Age: 49
End: 2019-07-16
Payer: COMMERCIAL

## 2019-07-16 ENCOUNTER — ANESTHESIA EVENT (OUTPATIENT)
Dept: OBSTETRICS AND GYNECOLOGY | Facility: OTHER | Age: 49
End: 2019-07-16
Payer: COMMERCIAL

## 2019-07-16 ENCOUNTER — CLINICAL SUPPORT (OUTPATIENT)
Dept: OBSTETRICS AND GYNECOLOGY | Facility: CLINIC | Age: 49
End: 2019-07-16
Payer: COMMERCIAL

## 2019-07-16 ENCOUNTER — ANESTHESIA (OUTPATIENT)
Dept: OBSTETRICS AND GYNECOLOGY | Facility: OTHER | Age: 49
End: 2019-07-16
Payer: COMMERCIAL

## 2019-07-16 ENCOUNTER — HOSPITAL ENCOUNTER (INPATIENT)
Facility: OTHER | Age: 49
LOS: 4 days | Discharge: HOME | End: 2019-07-20
Attending: OBSTETRICS & GYNECOLOGY | Admitting: OBSTETRICS & GYNECOLOGY
Payer: COMMERCIAL

## 2019-07-16 ENCOUNTER — HOSPITAL ENCOUNTER (OUTPATIENT)
Dept: PERINATAL CARE | Facility: OTHER | Age: 49
Discharge: HOME | End: 2019-07-16
Attending: PODIATRIST
Payer: COMMERCIAL

## 2019-07-16 VITALS
BODY MASS INDEX: 26.86 KG/M2 | DIASTOLIC BLOOD PRESSURE: 66 MMHG | WEIGHT: 177.25 LBS | SYSTOLIC BLOOD PRESSURE: 122 MMHG

## 2019-07-16 DIAGNOSIS — O28.8 NON-STRESS TEST WITH DECELERATIONS: ICD-10-CM

## 2019-07-16 DIAGNOSIS — Z34.90 ENCOUNTER FOR INDUCTION OF LABOR: ICD-10-CM

## 2019-07-16 DIAGNOSIS — Z34.93 PRENATAL CARE IN THIRD TRIMESTER: Primary | ICD-10-CM

## 2019-07-16 DIAGNOSIS — O09.513 ELDERLY PRIMIGRAVIDA IN THIRD TRIMESTER: ICD-10-CM

## 2019-07-16 PROBLEM — O09.93 SUPERVISION OF HIGH RISK PREGNANCY IN THIRD TRIMESTER: Status: ACTIVE | Noted: 2019-02-25

## 2019-07-16 LAB
ABO + RH BLD: NORMAL
BLD GP AB SCN CELLS X3 SERPL QL: NORMAL

## 2019-07-16 PROCEDURE — 11000001 HC ACUTE MED/SURG PRIVATE ROOM

## 2019-07-16 PROCEDURE — 90715 TDAP VACCINE GREATER THAN OR EQUAL TO 7YO IM: ICD-10-PCS | Mod: S$GLB,,, | Performed by: ADVANCED PRACTICE MIDWIFE

## 2019-07-16 PROCEDURE — 0502F PR SUBSEQUENT PRENATAL CARE: ICD-10-PCS | Mod: CPTII,S$GLB,, | Performed by: ADVANCED PRACTICE MIDWIFE

## 2019-07-16 PROCEDURE — 0502F SUBSEQUENT PRENATAL CARE: CPT | Mod: CPTII,S$GLB,, | Performed by: ADVANCED PRACTICE MIDWIFE

## 2019-07-16 PROCEDURE — 99999 PR PBB SHADOW E&M-EST. PATIENT-LVL II: ICD-10-PCS | Mod: PBBFAC,,, | Performed by: ADVANCED PRACTICE MIDWIFE

## 2019-07-16 PROCEDURE — 99999 PR PBB SHADOW E&M-EST. PATIENT-LVL II: CPT | Mod: PBBFAC,,,

## 2019-07-16 PROCEDURE — 59025 FETAL NON-STRESS TEST: CPT | Mod: 26,77,, | Performed by: OBSTETRICS & GYNECOLOGY

## 2019-07-16 PROCEDURE — 90471 IMMUNIZATION ADMIN: CPT | Mod: S$GLB,,, | Performed by: ADVANCED PRACTICE MIDWIFE

## 2019-07-16 PROCEDURE — 99999 PR PBB SHADOW E&M-EST. PATIENT-LVL II: ICD-10-PCS | Mod: PBBFAC,,,

## 2019-07-16 PROCEDURE — 59025 PR FETAL 2N-STRESS TEST: ICD-10-PCS | Mod: 26,77,, | Performed by: OBSTETRICS & GYNECOLOGY

## 2019-07-16 PROCEDURE — 90471 TDAP VACCINE GREATER THAN OR EQUAL TO 7YO IM: ICD-10-PCS | Mod: S$GLB,,, | Performed by: ADVANCED PRACTICE MIDWIFE

## 2019-07-16 PROCEDURE — 86850 RBC ANTIBODY SCREEN: CPT

## 2019-07-16 PROCEDURE — 90715 TDAP VACCINE 7 YRS/> IM: CPT | Mod: S$GLB,,, | Performed by: ADVANCED PRACTICE MIDWIFE

## 2019-07-16 PROCEDURE — 99999 PR PBB SHADOW E&M-EST. PATIENT-LVL II: CPT | Mod: PBBFAC,,, | Performed by: ADVANCED PRACTICE MIDWIFE

## 2019-07-16 PROCEDURE — 72100002 HC LABOR CARE, 1ST 8 HOURS

## 2019-07-16 PROCEDURE — 87081 CULTURE SCREEN ONLY: CPT

## 2019-07-16 PROCEDURE — 59025 FETAL NON-STRESS TEST: CPT

## 2019-07-16 RX ORDER — ONDANSETRON 8 MG/1
8 TABLET, ORALLY DISINTEGRATING ORAL EVERY 8 HOURS PRN
Status: DISCONTINUED | OUTPATIENT
Start: 2019-07-16 | End: 2019-07-18

## 2019-07-16 RX ORDER — SODIUM CHLORIDE, SODIUM LACTATE, POTASSIUM CHLORIDE, CALCIUM CHLORIDE 600; 310; 30; 20 MG/100ML; MG/100ML; MG/100ML; MG/100ML
INJECTION, SOLUTION INTRAVENOUS CONTINUOUS
Status: DISCONTINUED | OUTPATIENT
Start: 2019-07-16 | End: 2019-07-18

## 2019-07-16 RX ORDER — SODIUM CHLORIDE 9 MG/ML
INJECTION, SOLUTION INTRAVENOUS
Status: DISCONTINUED | OUTPATIENT
Start: 2019-07-16 | End: 2019-07-18

## 2019-07-16 RX ORDER — MISOPROSTOL 200 UG/1
600 TABLET ORAL
Status: DISCONTINUED | OUTPATIENT
Start: 2019-07-16 | End: 2019-07-18

## 2019-07-16 NOTE — PROGRESS NOTES
49 y.o. female  at 37w1d   Reports + FM, denies VB, LOF or regular CTX  Doing well without concerns today  Today we sign consent/ advanced directives/ she brings copy of birth plan (already discussed last visit with MB)  TW lbs   GBS collected today and orders placed for HIV/ RPR and Rubella IgG-she has to report to PNT at 4pm then will have blood drawn.  Reviewed warning signs, normal FKCs, labor precautions and how/when to call.  RTC x 1 wks, call or present sooner prn.   Briefly mentions she prefers TYLENOL postpartum for pain versus NSAIDS--due to history of NSAIDS being harmful to her stomach/ GI tract---wants a note on her chart (placed on pink sticky note)  Birth Center Risk Assessment: 0  0- CNM management in ABC  1- CNM management on L&D  2- Consultation with OB to develop plan of care  3- Collaborative CNM/OB management with delivery on L&D  4- Referral or transfer of care to MD

## 2019-07-16 NOTE — PROGRESS NOTES
Indication  ========    AMA    History  ======    Previous Outcomes   1  Para 0  Risk Factors  Details: IVF (egg donor 27 y/o and sperm donor 31 y/o)  AMA    Pregnancy  =========    [place] pregnancy. Number of fetuses: 1    Dating  ======    Cycle: regular cycle  Conception: IVF  Embryo transfer on: 2018  IVF / ET 5 d  GA by IVF / ET 37 w + 0 d  FENG by IVF / ET: 2019  Assigned: Dating performed on 2019, based on the IVF / ET date  Assigned GA 37 w + 0 d  Assigned FENG: 2019  Pregnancy length 280 d    Non Stress Test  =============    NST interpretation: reactive. Test duration 40 min. Baseline  bpm. Baseline variability: moderate. Accelerations: present.  Decelerations: present, late x 2. Uterine activity: present. Acoustic stimulation: no  reports + fetal movement, denies ctx vag bleeding or loss of fluid.    Impression  =========    NST Reactive but two late decelerations noted (16:08 and 16:18). Given late decelerations at term, recommend induction of labor.  This was communicated to PNT RN, Coni Mai who discussed with primary OB provider, Wendy Gerhardt CNM who was not in agreement  with plans for induction.  Patient sent to L&D for further monitoring.  MFM recommendation is induction of labor with continuous fetal monitoring.

## 2019-07-17 PROBLEM — O41.1230 CHORIOAMNIONITIS IN THIRD TRIMESTER: Status: ACTIVE | Noted: 2019-07-17

## 2019-07-17 PROCEDURE — 27200710 HC EPIDURAL INFUSION PUMP SET: Performed by: STUDENT IN AN ORGANIZED HEALTH CARE EDUCATION/TRAINING PROGRAM

## 2019-07-17 PROCEDURE — S0020 INJECTION, BUPIVICAINE HYDRO: HCPCS | Performed by: ANESTHESIOLOGY

## 2019-07-17 PROCEDURE — 59400 PRA FULL ROUT OBSTE CARE,VAGINAL DELIV: ICD-10-PCS | Mod: QY,,, | Performed by: ANESTHESIOLOGY

## 2019-07-17 PROCEDURE — 25000003 PHARM REV CODE 250: Performed by: ADVANCED PRACTICE MIDWIFE

## 2019-07-17 PROCEDURE — 59400 OBSTETRICAL CARE: CPT | Mod: QY,,, | Performed by: ANESTHESIOLOGY

## 2019-07-17 PROCEDURE — 25000003 PHARM REV CODE 250: Performed by: ANESTHESIOLOGY

## 2019-07-17 PROCEDURE — 63600175 PHARM REV CODE 636 W HCPCS: Performed by: MIDWIFE

## 2019-07-17 PROCEDURE — 51702 INSERT TEMP BLADDER CATH: CPT

## 2019-07-17 PROCEDURE — 72100003 HC LABOR CARE, EA. ADDL. 8 HRS

## 2019-07-17 PROCEDURE — 62326 NJX INTERLAMINAR LMBR/SAC: CPT | Performed by: STUDENT IN AN ORGANIZED HEALTH CARE EDUCATION/TRAINING PROGRAM

## 2019-07-17 PROCEDURE — 63600175 PHARM REV CODE 636 W HCPCS: Performed by: ADVANCED PRACTICE MIDWIFE

## 2019-07-17 PROCEDURE — 27800517 HC TRAY,EPIDURAL-CONTINUOUS: Performed by: STUDENT IN AN ORGANIZED HEALTH CARE EDUCATION/TRAINING PROGRAM

## 2019-07-17 PROCEDURE — 11000001 HC ACUTE MED/SURG PRIVATE ROOM

## 2019-07-17 PROCEDURE — 25000003 PHARM REV CODE 250: Performed by: STUDENT IN AN ORGANIZED HEALTH CARE EDUCATION/TRAINING PROGRAM

## 2019-07-17 PROCEDURE — 59200 INSERT CERVICAL DILATOR: CPT

## 2019-07-17 PROCEDURE — 63600175 PHARM REV CODE 636 W HCPCS: Performed by: STUDENT IN AN ORGANIZED HEALTH CARE EDUCATION/TRAINING PROGRAM

## 2019-07-17 RX ORDER — FENTANYL/BUPIVACAINE/NS/PF 2MCG/ML-.1
PLASTIC BAG, INJECTION (ML) INJECTION CONTINUOUS PRN
Status: DISCONTINUED | OUTPATIENT
Start: 2019-07-17 | End: 2019-07-18

## 2019-07-17 RX ORDER — FENTANYL CITRATE 50 UG/ML
INJECTION, SOLUTION INTRAMUSCULAR; INTRAVENOUS
Status: COMPLETED
Start: 2019-07-17 | End: 2019-07-17

## 2019-07-17 RX ORDER — BUPIVACAINE HYDROCHLORIDE 2.5 MG/ML
INJECTION, SOLUTION EPIDURAL; INFILTRATION; INTRACAUDAL
Status: DISPENSED
Start: 2019-07-17 | End: 2019-07-18

## 2019-07-17 RX ORDER — FENTANYL CITRATE 50 UG/ML
INJECTION, SOLUTION INTRAMUSCULAR; INTRAVENOUS
Status: DISCONTINUED | OUTPATIENT
Start: 2019-07-17 | End: 2019-07-18

## 2019-07-17 RX ORDER — FAMOTIDINE 10 MG/ML
20 INJECTION INTRAVENOUS ONCE
Status: CANCELLED | OUTPATIENT
Start: 2019-07-18 | End: 2019-07-18

## 2019-07-17 RX ORDER — ACETAMINOPHEN 500 MG
1000 TABLET ORAL EVERY 6 HOURS PRN
Status: DISCONTINUED | OUTPATIENT
Start: 2019-07-17 | End: 2019-07-18

## 2019-07-17 RX ORDER — FENTANYL/BUPIVACAINE/NS/PF 2MCG/ML-.1
PLASTIC BAG, INJECTION (ML) INJECTION CONTINUOUS
Status: CANCELLED | OUTPATIENT
Start: 2019-07-18

## 2019-07-17 RX ORDER — SODIUM CITRATE AND CITRIC ACID MONOHYDRATE 334; 500 MG/5ML; MG/5ML
30 SOLUTION ORAL ONCE
Status: CANCELLED | OUTPATIENT
Start: 2019-07-18 | End: 2019-07-18

## 2019-07-17 RX ORDER — BUPIVACAINE HYDROCHLORIDE 2.5 MG/ML
INJECTION, SOLUTION INFILTRATION; PERINEURAL
Status: COMPLETED | OUTPATIENT
Start: 2019-07-17 | End: 2019-07-17

## 2019-07-17 RX ORDER — FENTANYL/BUPIVACAINE/NS/PF 2MCG/ML-.1
PLASTIC BAG, INJECTION (ML) INJECTION
Status: DISPENSED
Start: 2019-07-17 | End: 2019-07-18

## 2019-07-17 RX ORDER — OXYTOCIN/RINGER'S LACTATE 20/1000 ML
2 PLASTIC BAG, INJECTION (ML) INTRAVENOUS CONTINUOUS
Status: DISCONTINUED | OUTPATIENT
Start: 2019-07-17 | End: 2019-07-18

## 2019-07-17 RX ADMIN — Medication 2 MILLI-UNITS/MIN: at 11:07

## 2019-07-17 RX ADMIN — BUPIVACAINE HYDROCHLORIDE 1 ML: 2.5 INJECTION, SOLUTION INFILTRATION; PERINEURAL at 11:07

## 2019-07-17 RX ADMIN — FENTANYL CITRATE 10 MCG: 50 INJECTION, SOLUTION INTRAMUSCULAR; INTRAVENOUS at 10:07

## 2019-07-17 RX ADMIN — Medication 10 ML/HR: at 11:07

## 2019-07-17 RX ADMIN — ACETAMINOPHEN 1000 MG: 500 TABLET ORAL at 05:07

## 2019-07-17 RX ADMIN — SODIUM CHLORIDE, SODIUM LACTATE, POTASSIUM CHLORIDE, AND CALCIUM CHLORIDE: .6; .31; .03; .02 INJECTION, SOLUTION INTRAVENOUS at 11:07

## 2019-07-17 RX ADMIN — AMPICILLIN SODIUM 2 G: 2 INJECTION, POWDER, FOR SOLUTION INTRAMUSCULAR; INTRAVENOUS at 11:07

## 2019-07-17 RX ADMIN — AMPICILLIN SODIUM 2 G: 2 INJECTION, POWDER, FOR SOLUTION INTRAMUSCULAR; INTRAVENOUS at 05:07

## 2019-07-17 NOTE — H&P
[facility]  Obstetrics  History & Physical    Patient Name: Dipti Pacheco  MRN: [id number]  Admission Date: 2019  Primary Care Provider: Dhruv Celis MD    Subjective:     Principal Problem:<principal problem not specified>    History of Present Illness:  No notes on file    Obstetric HPI:  Patient reports None contractions, active fetal movement, No vaginal bleeding , No loss of fluid     This pregnancy has been complicated by AMA and IVF with donor egg and sperm.    OB History    Para Term  AB Living   1 0 0 0 0 0   SAB TAB Ectopic Multiple Live Births   0 0 0 0 0      # Outcome Date GA Lbr Anurag/2nd Weight Sex Delivery Anes PTL Lv   1 Current              Past Medical History:   Diagnosis Date    Allergy     Anxiety     Subclinical hypothyroidism      Past Surgical History:   Procedure Laterality Date    dental implants      Egg retrieval         PTA Medications   Medication Sig    aspirin (ECOTRIN) 81 MG EC tablet Take 81 mg by mouth once daily.    B INFANTIS/B ANI/B JET/B BIFID (PROBIOTIC DIGESTIVE CARE ORAL) Take 1 capsule by mouth as needed.    calcium-vitamin D3 (CALCIUM 500 + D) 500 mg(1,250mg) -200 unit per tablet Take 1 tablet by mouth 2 (two) times daily with meals.    cyanocobalamin, vitamin B-12, (VITAMIN B-12) 50 mcg tablet Take 50 mcg by mouth once daily.    hydrocortisone 2.5 % cream Apply topically 2 (two) times daily.    levothyroxine (SYNTHROID) 50 MCG tablet Take 1 tablet (50 mcg total) by mouth before breakfast.    multivitamin (ONE DAILY MULTIVITAMIN) per tablet Take 1 tablet by mouth once daily.    prenatal vit 55-iron-folic-om3 29-1-430 mg CPKD Take 1 tablet by mouth once daily.    valACYclovir (VALTREX) 500 MG tablet Take 1 tablet (500 mg total) by mouth once daily.       Review of patient's allergies indicates:   Allergen Reactions    Aspirin Nausea Only     Powdered form only         Family History     Problem Relation  (Age of Onset)    Alcohol abuse Brother, Paternal Grandfather    Breast cancer Mother (71)    Cancer Mother    Depression Brother    Diabetes Mellitus Paternal Grandmother        Tobacco Use    Smoking status: Never Smoker    Smokeless tobacco: Never Used   Substance and Sexual Activity    Alcohol use: Yes     Comment: socially    Drug use: No    Sexual activity: Not Currently     Partners: Male     Review of Systems   Cardiovascular: Negative for chest pain.   Gastrointestinal: Negative for abdominal pain.   Neurological: Negative for headaches.   All other systems reviewed and are negative.     Objective:     Vital Signs (Most Recent):    Vital Signs (24h Range):  Temp:  [97.8 °F (36.6 °C)] 97.8 °F (36.6 °C)  Pulse:  [66-69] 69  Resp:  [18] 18  SpO2:  [100 %] 100 %  BP: (122-134)/(66-85) 134/79        There is no height or weight on file to calculate BMI.    FHT: 120-128 baseline Cat 1 (reassuring), in [facility]  TOCO:  Q 4-7 minutes    Physical Exam:   Constitutional: She is oriented to person, place, and time. She appears well-developed and well-nourished.    HENT:   Head: Normocephalic.    Eyes: Conjunctivae and EOM are normal.    Neck: Normal range of motion. Neck supple.    Cardiovascular: Normal rate, regular rhythm, normal heart sounds and intact distal pulses.     Pulmonary/Chest: Effort normal and breath sounds normal.        Abdominal: Soft. Bowel sounds are normal.   Gravid, EFW 6.5-7#     Genitourinary: Vagina normal.   Genitourinary Comments: Speculum exam, no lesions noted. Has only been on Valtrex x 2 days. First and last outbreak in 2005.           Musculoskeletal: Normal range of motion.       Neurological: She is alert and oriented to person, place, and time. She has normal reflexes.    Skin: Skin is warm and dry.    Psychiatric: She has a normal mood and affect. Her behavior is normal.       Cervix:  Dilation:  0  Effacement:  20%  Station: -2  Presentation: Vertex     Significant Labs:  Lab  Results   Component Value Date    GROUPTR A POS 2019       I have personallly reviewed all pertinent lab results from the last 24 hours.  Recent Lab Results       19  1555        Baso # 0.04     Basophil% 0.5     Differential Method Automated     Eos # 0.1     Eosinophil% 1.4     Gran # (ANC) 5.3     Gran% 69.2     Hematocrit 29.4     Hemoglobin 9.6     Immature Grans (Abs) 0.04  Comment:  Mild elevation in immature granulocytes is non specific and   can be seen in a variety of conditions including stress response,   acute inflammation, trauma and pregnancy. Correlation with other   laboratory and clinical findings is essential.       Immature Granulocytes 0.5     Lymph # 1.6     Lymph% 20.5     MCH 28.9     MCHC 32.7     MCV 89     Mono # 0.6     Mono% 7.9     MPV 10.9     nRBC 0     Platelets 293     RBC 3.32     RDW 13.2     WBC 7.62         Assessment/Plan:     49 y.o. female  at 37w1d for:    No notes have been filed under this hospital service.  Service: Obstetrics and Gynecology    Admit to L&D  IOL per [facility] recommendation  Carlito kent followed by Pitocin in am  Monitor progress    Wendy D Gerhardt, CNM  Obstetrics  [facility]

## 2019-07-17 NOTE — HPI
History of Present Illness:   Dipti Pacheco is a 49 y.o. female  at 37w1d with Estimated Date of Delivery: 19 based on ultrasound and IVF who presents to Labor and Delivery accompanied by friend.     Reports no regular uterine contractions. They are now irreg. minutes apart and increasing in intensity and duration.  mild contractions, active fetal movement, No vaginal bleeding , No loss of fluid.     Last ate 19 at 2000, last drank 19 at 2200.     This pregnancy has been complicated by AMA, IVF, decelerations on NST  Patient Active Problem List   Diagnosis    Mouth ulcers    Leukoplakia    Secondary physiologic amenorrhea    Low bone mass    Hearing loss sensory, bilateral    Posture imbalance    Supervision of high risk pregnancy in third trimester    High risk pregnancy due to assisted reproductive technology in second trimester    Elderly primigravida in third trimester    Non-stress test with decelerations    Encounter for induction of labor        Presentation: vtx  Estimated Fetal Weight: 6.5-7#    Birth Center Risk Assessment: 1-management on labor and Delivery, for IOL    0- CNM management in ABC  1- CNM management on L&D  2- Consultation with OB to develop  plan of care  3- Collaborative CNM/OB management with delivery on L&D  4- Permanent referral of care to MD

## 2019-07-17 NOTE — HOSPITAL COURSE
2019 - IOL for NRFHTs per MFM. Cook cath placed @ 19 1000, cook dilator removed, cervix 3/70/-2, will augment w/ pitocin. 1730 Chorioamnionitis dx - gent/clinda started.  2019 -  of male infant. 2nd degree lac. EBL: 175  2019 @ 1130: D/C home.

## 2019-07-17 NOTE — PROGRESS NOTES
"LABOR NOTE    S: Called to room by nurse pt shivering cold, FHT's 180's. Family at bedside and supportive.     O: BP (!) 112/57   Pulse 87   Temp 98.5 °F (36.9 °C) (Oral)   Resp 16   Ht 5' 8.11" (1.73 m)   Wt 80.4 kg (177 lb 4 oz)   LMP 2018   SpO2 100%   Breastfeeding? No   BMI 26.86 kg/m²     GENERAL: Calm and appropriate affect,pt shivering cold, several blankets, states she is a little anxious, reassured  NEURO: Alert, oriented, normal speech  ABDOMEN: Nontender, Fundus palpates soft between UC's.  FHT: Baseline 150, moderate BTBV, positive prolonged  accels, no decels. Cat 1, reassuring.  CTX: q 2-5 minutes      ASSESSMENT:   49 y.o.  IUP at 37w2d, FHT reassuring/ Cat 1    Patient Active Problem List   Diagnosis    Mouth ulcers    Leukoplakia    Secondary physiologic amenorrhea    Low bone mass    Hearing loss sensory, bilateral    Posture imbalance    Supervision of high risk pregnancy in third trimester    High risk pregnancy due to assisted reproductive technology in second trimester    Elderly primigravida in third trimester    Non-stress test with decelerations    Encounter for induction of labor         PLAN:  Continue close Maternal/Fetal monitoring  Pitocin Augmentation per protocol - currently infusing at 6mu  Recheck 2 hours or PRN  Epidural per anesthesia at pt's request    Teofilo/ JEANNINE        "

## 2019-07-17 NOTE — PROGRESS NOTES
[facility]  Obstetrics  Labor Progress Note    Patient Name: Dipti Pacheco  MRN: [id number]  Admission Date: 2019  Hospital Length of Stay: 1 days  Attending Physician: Edith Machado MD  Primary Care Provider: Dhruv Celis MD    Subjective:     Principal Problem:Encounter for induction of labor    Hospital Course:  Pt sent from prenatal testing to [facility] after NST that had some late decelerations.   Monitor strip in [facility] looked reassuring with only one noted late decel and many accels.  Client checked out AMA just to go home and get belongings and car-seat  Cook cath placed @  per Dr Smith, 4th year resident without difficulty  19 1000 hours, cook dilator removed, cervix 3/70/-, will augment w/ pitocin    Interval History:  Dipti is a 49 y.o.  at 37w2d. She is doing well.    Objective:     Vital Signs (Most Recent):  Temp: 96.8 °F (36 °C) (19)  Pulse: 68 (19)  Resp: 16 (19)  BP: 113/65 (19)  SpO2: 99 % (19 0632) Vital Signs (24h Range):  Temp:  [96.8 °F (36 °C)-97.8 °F (36.6 °C)] 96.8 °F (36 °C)  Pulse:  [60-80] 68  Resp:  [16-18] 16  SpO2:  [96 %-100 %] 99 %  BP: (113-135)/(60-85) 113/65     Weight: 80.4 kg (177 lb 4 oz)  Body mass index is 26.86 kg/m².    FHT: 125Cat 1 (reassuring)  TOCO:  Q 3-5 minutes    Cervical Exam:  Dilation:  3  Effacement:  70%  Station: -2  Presentation: Vertex     Significant Labs:  Lab Results   Component Value Date    GROUPTRH A POS 2019    STREPBCULT Normal cervicovaginal marjorie present 2019       I have personallly reviewed all pertinent lab results from the last 24 hours.  Recent Lab Results       19  2110   19  1555   19  1534        Baso #   0.04       Basophil%   0.5       Differential Method   Automated       Eos #   0.1       Eosinophil%   1.4       Gran # (ANC)   5.3       Gran%   69.2       Group & Rh A POS         Hematocrit   29.4        Hemoglobin   9.6       Immature Grans (Abs)   0.04  Comment:  Mild elevation in immature granulocytes is non specific and   can be seen in a variety of conditions including stress response,   acute inflammation, trauma and pregnancy. Correlation with other   laboratory and clinical findings is essential.         Immature Granulocytes   0.5       INDIRECT RAPHAEL NEG         Lymph #   1.6       Lymph%   20.5       MCH   28.9       MCHC   32.7       MCV   89       Mono #   0.6       Mono%   7.9       MPV   10.9       nRBC   0       Platelets   293       RBC   3.32       RDW   13.2       Rubella IgG Antibodies   14.5       Rubella Immune Status   Reactive  Comment:  0.0-4.9 IU/mL  Nonreactive (Non-Immune)  5.0-9.9 IU/mL  Indeterminate  10.0-400.0 IU/mL Reactive (Immune)  These results were obtained with the IMMULITE 2000 Rubella  Quantitative IgG assay. Values obtained from other   manufacturers' assay methods may not be used interchangeably.          Strep B Culture     Normal cervicovaginal marjorie present[P]     WBC   7.62             Physical Exam:   Constitutional: She is oriented to person, place, and time. She appears well-developed and well-nourished.    HENT:   Head: Normocephalic.     Neck: Normal range of motion.     Pulmonary/Chest: Effort normal.        Abdominal: Soft.     Genitourinary: Vagina normal. No vaginal discharge found.           Musculoskeletal: Normal range of motion and moves all extremeties. She exhibits no edema.       Neurological: She is alert and oriented to person, place, and time.    Skin: Skin is warm and dry.    Psychiatric: She has a normal mood and affect. Her behavior is normal. Judgment and thought content normal.       Assessment/Plan:     49 y.o. female  at 37w2d for:    * Encounter for induction of labor  NST in prenatal showed late decelerations. Went to [facility] and monitor much more reassuring. Client went home AMA from [facility] to get things for admission.   Admitted for  IOL d/t NRFHT in PNT per Dr Reynoso [facility], recommended IOL  7/17/19 Cook catheter currently in place as of 0820 hr, remove at 1000 hours  7/17/19 Cook cath removed, cervix 3/70/-2, soft, posterior, pitocin augmentation    Non-stress test with decelerations  [facility] recommends IOL          David Yee CNM  Obstetrics  [facility]

## 2019-07-17 NOTE — PROGRESS NOTES
"LABOR NOTE  Assuming care of patient, secondary to change of shift.    S:  Pt reports feels fatigued and tired. Also intermittently feeling chills.  Reports feeling some occasional, mild lower abdominal cramping, but denies pain or need for pain medication.  She has a friend at bedside who is supportive.     O: /69   Pulse 93   Temp (!) 100.5 °F (38.1 °C) (Oral)   Resp 16   Ht 5' 8.11" (1.73 m)   Wt 80.4 kg (177 lb 4 oz)   LMP 2018   SpO2 98%   Breastfeeding? No   BMI 26.86 kg/m²     GENERAL: Calm and appropriate affect  NEURO: Alert, oriented, normal speech  ABDOMEN: Nontender, Fundus palpates soft between UC's.  FHT: Baseline 165, moderate BTBV, positive accels, no decels. Cat 2.  CTX: q 3-5 minutes, mild per palpation  SVE: 3/70/-3, foul vaginal odor, although no vaginal fluid noted to be leaking      ASSESSMENT:   49 y.o.  IUP at 37w2d, Cat 2    Patient Active Problem List   Diagnosis    Mouth ulcers    Leukoplakia    Low bone mass    Hearing loss sensory, bilateral    Posture imbalance    Supervision of high risk pregnancy in third trimester    High risk pregnancy due to assisted reproductive technology in second trimester    Elderly primigravida in third trimester    Non-stress test with decelerations    Encounter for induction of labor    Chorioamnionitis in third trimester         PLAN:  Extensive discussion regarding diagnosis of Chorioamnionitis - reviewed risks to herself and pregnancy, pt desires to proceed with induction and does not desire a C/S at this time.  Reviewed indications for potentially urgent  section (fetal distress, unstable condition for herself, lack of labor progress, etc) and she is agreeable.  Will continue to monitor closely.  Amp/Gent IVPB now, as ordered. Tylenol 1000mg PO x1 now.  Continue close Maternal/Fetal monitoring  Pitocin Augmentation per protocol - currently infusing at 8mu  Recheck 2 hours or PRN  Epidural per anesthesia at " pt's request  Consult with Dr. Cassidy, on call OB/GYN staff physician, in agreement re: POC, EFM reviewed with physician.    Kamilah Chaidez CNM

## 2019-07-17 NOTE — PROGRESS NOTES
MW called for assistance with balloon placement.   Per midwife, cervix 0.5/50/-3    Patient put in stirrups  Speculum used to visualize cervix  Cook catheter passed through cervical os without difficulty    Inflated to 40 cc in both balloons  Inflate 20 cc in each, q 20 minutes x2.       MWs to continue further management      Sydni Smith MD  Ob/Gyn PGY-4

## 2019-07-17 NOTE — ASSESSMENT & PLAN NOTE
NST in prenatal showed late decelerations. Went to [facility] and monitor much more reassuring. Client went home AMA from [facility] to get things for admission.   Admitted for IOL d/t NRFHT in PNT per Dr Reynoso [facility], recommended IOL  7/17/19 Cook catheter currently in place as of 0820 hr, remove at 1000 hours  7/17/19 Cook cath removed, cervix 3/70/-2, soft, posterior, pitocin augmentation

## 2019-07-17 NOTE — SUBJECTIVE & OBJECTIVE
Interval History:  Dipti is a 49 y.o.  at 37w1d. She is doing well. Client presents after going home to get belongings after reassuring monitor strip in [facility]. Returned to L%D @ .    Objective:     Vital Signs (Most Recent):  Temp: 97.2 °F (36.2 °C) (19)  Pulse: 66 (19)  Resp: 16 (19)  BP: 118/68 (19)  SpO2: 100 % (19) Vital Signs (24h Range):  Temp:  [97.2 °F (36.2 °C)-97.8 °F (36.6 °C)] 97.2 °F (36.2 °C)  Pulse:  [66-78] 66  Resp:  [16-18] 16  SpO2:  [96 %-100 %] 100 %  BP: (118-134)/(66-85) 118/68     Weight: 80.4 kg (177 lb 4 oz)  Body mass index is 26.86 kg/m².    FHT: 120-126Cat 1 (reassuring), in [facility]  TOCO:  Q occassional contrx    Cervical Exam: @ 1730 in [facility]  Dilation:  0  Effacement:  20%  Station: -2  Presentation: Vertex     Significant Labs:  Lab Results   Component Value Date    GROUPTRH A POS 2019       I have personallly reviewed all pertinent lab results from the last 24 hours.  Recent Lab Results       19  1555        Baso # 0.04     Basophil% 0.5     Differential Method Automated     Eos # 0.1     Eosinophil% 1.4     Gran # (ANC) 5.3     Gran% 69.2     Hematocrit 29.4     Hemoglobin 9.6     Immature Grans (Abs) 0.04  Comment:  Mild elevation in immature granulocytes is non specific and   can be seen in a variety of conditions including stress response,   acute inflammation, trauma and pregnancy. Correlation with other   laboratory and clinical findings is essential.       Immature Granulocytes 0.5     Lymph # 1.6     Lymph% 20.5     MCH 28.9     MCHC 32.7     MCV 89     Mono # 0.6     Mono% 7.9     MPV 10.9     nRBC 0     Platelets 293     RBC 3.32     RDW 13.2     WBC 7.62

## 2019-07-17 NOTE — PROGRESS NOTES
Spoke w/ attending, Dr Ortiz, report given on pt, agreed w/ POC to continue pitocin augmentation, no further rec made

## 2019-07-17 NOTE — ANESTHESIA PREPROCEDURE EVALUATION
Dipti Pacheco is a 49 y.o. female   at 37w1d with Estimated Date of Delivery: 19 based on 19 who presents to prenatal testing and was sent to [facility] for evaluation of decelerations on NST accompanied by no one.      Reports regular no uterine contractions. active fetal movement, No vaginal     OB History    Para Term  AB Living   1   0         SAB TAB Ectopic Multiple Live Births                  # Outcome Date GA Lbr Anurag/2nd Weight Sex Delivery Anes PTL Lv   1 Current                Wt Readings from Last 1 Encounters:   19 1501 80.4 kg (177 lb 4 oz)       BP Readings from Last 3 Encounters:   19 134/79   19 122/66   19 102/64       Patient Active Problem List   Diagnosis    Mouth ulcers    Leukoplakia    Secondary physiologic amenorrhea    Low bone mass    Hearing loss sensory, bilateral    Posture imbalance    Supervision of high risk pregnancy in second trimester    High risk pregnancy due to assisted reproductive technology in second trimester    Elderly multigravida in second trimester    Elderly primigravida in third trimester       Past Surgical History:   Procedure Laterality Date    dental implants      Egg retrieval         Social History     Socioeconomic History    Marital status: Single     Spouse name: Not on file    Number of children: Not on file    Years of education: Not on file    Highest education level: Not on file   Occupational History     Employer: [employer]   Social Needs    Financial resource strain: Not on file    Food insecurity:     Worry: Not on file     Inability: Not on file    Transportation needs:     Medical: Not on file     Non-medical: Not on file   Tobacco Use    Smoking status: Never Smoker    Smokeless tobacco: Never Used   Substance and Sexual Activity    Alcohol use: Yes     Comment: socially    Drug use: No    Sexual activity: Not Currently     Partners: Male   Lifestyle     Physical activity:     Days per week: Not on file     Minutes per session: Not on file    Stress: Not on file   Relationships    Social connections:     Talks on phone: Not on file     Gets together: Not on file     Attends Mandaen service: Not on file     Active member of club or organization: Not on file     Attends meetings of clubs or organizations: Not on file     Relationship status: Not on file   Other Topics Concern    Are you pregnant or think you may be? No    Breast-feeding No   Social History Narrative    Not on file         Chemistry        Component Value Date/Time     (L) 04/27/2018 1506    K 5.1 04/27/2018 1506     04/27/2018 1506    CO2 27 04/27/2018 1506    BUN 20 04/27/2018 1506    CREATININE 0.8 04/27/2018 1506    GLU 92 04/27/2018 1506        Component Value Date/Time    CALCIUM 9.7 04/27/2018 1506    ALKPHOS 63 04/27/2018 1506    AST 25 04/27/2018 1506    ALT 29 04/27/2018 1506    BILITOT 0.3 04/27/2018 1506    ESTGFRAFRICA >60 04/27/2018 1506    EGFRNONAA >60 04/27/2018 1506            Lab Results   Component Value Date    WBC 7.62 07/16/2019    HGB 9.6 (L) 07/16/2019    HCT 29.4 (L) 07/16/2019    MCV 89 07/16/2019     07/16/2019       No results for input(s): PT, INR, PROTIME, APTT in the last 72 hours.                  Anesthesia Evaluation    I have reviewed the Patient Summary Reports.    I have reviewed the Nursing Notes.   I have reviewed the Medications.     Review of Systems  Anesthesia Hx:  No problems with previous Anesthesia  History of prior surgery of interest to airway management or planning: Denies Family Hx of Anesthesia complications.    Social:  Non-Smoker, No Alcohol Use    Hematology/Oncology:  Hematology Normal   Oncology Normal     EENT/Dental:EENT/Dental Normal   Cardiovascular:  Cardiovascular Normal     Pulmonary:  Pulmonary Normal    Renal/:  Renal/ Normal     Hepatic/GI:  Hepatic/GI Normal    Neurological:  Neurology Normal     Endocrine:   Hypothyroidism    Psych:  Psychiatric Normal           Physical Exam  General:  Well nourished    Airway/Jaw/Neck:  Airway Findings: Mouth Opening: Normal Tongue: Normal  General Airway Assessment: Adult, Good  Mallampati: II  TM Distance: Normal, at least 6 cm  Jaw/Neck Findings:  Neck ROM: Normal ROM      Dental:  Dental Findings: In tact   Chest/Lungs:  Chest/Lungs Clear    Heart/Vascular:  Heart Findings: Normal Heart murmur: negative       Mental Status:  Mental Status Findings:  Cooperative, Alert and Oriented         Anesthesia Plan  Type of Anesthesia, risks & benefits discussed:  Anesthesia Type:  general, epidural, CSE, spinal, MAC  Patient's Preference:   Intra-op Monitoring Plan: standard ASA monitors  Intra-op Monitoring Plan Comments:   Post Op Pain Control Plan: intrathecal opioid, per primary service following discharge from PACU, IV/PO Opioids PRN, epidural analgesia and multimodal analgesia  Post Op Pain Control Plan Comments:   Induction:   IV  Beta Blocker:         Informed Consent: Patient understands risks and agrees with Anesthesia plan.  Questions answered. Anesthesia consent signed with patient.  ASA Score: 2     Day of Surgery Review of History & Physical:            Ready For Surgery From Anesthesia Perspective.

## 2019-07-17 NOTE — SUBJECTIVE & OBJECTIVE
Interval History:  Dipti is a 49 y.o.  at 37w2d. She is doing well.    Objective:     Vital Signs (Most Recent):  Temp: 96.8 °F (36 °C) (19)  Pulse: 68 (19)  Resp: 16 (19)  BP: 113/65 (19)  SpO2: 99 % (19 0632) Vital Signs (24h Range):  Temp:  [96.8 °F (36 °C)-97.8 °F (36.6 °C)] 96.8 °F (36 °C)  Pulse:  [60-80] 68  Resp:  [16-18] 16  SpO2:  [96 %-100 %] 99 %  BP: (113-135)/(60-85) 113/65     Weight: 80.4 kg (177 lb 4 oz)  Body mass index is 26.86 kg/m².    FHT: 125Cat 1 (reassuring)  TOCO:  Q 3-5 minutes    Cervical Exam:  Dilation:  3  Effacement:  70%  Station: -2  Presentation: Vertex     Significant Labs:  Lab Results   Component Value Date    GROUPTRH A POS 2019    STREPBCULT Normal cervicovaginal marjorie present 2019       I have personallly reviewed all pertinent lab results from the last 24 hours.  Recent Lab Results       19  2110   19  1555   19  1534        Baso #   0.04       Basophil%   0.5       Differential Method   Automated       Eos #   0.1       Eosinophil%   1.4       Gran # (ANC)   5.3       Gran%   69.2       Group & Rh A POS         Hematocrit   29.4       Hemoglobin   9.6       Immature Grans (Abs)   0.04  Comment:  Mild elevation in immature granulocytes is non specific and   can be seen in a variety of conditions including stress response,   acute inflammation, trauma and pregnancy. Correlation with other   laboratory and clinical findings is essential.         Immature Granulocytes   0.5       INDIRECT RAPHAEL NEG         Lymph #   1.6       Lymph%   20.5       MCH   28.9       MCHC   32.7       MCV   89       Mono #   0.6       Mono%   7.9       MPV   10.9       nRBC   0       Platelets   293       RBC   3.32       RDW   13.2       Rubella IgG Antibodies   14.5       Rubella Immune Status   Reactive  Comment:  0.0-4.9 IU/mL  Nonreactive (Non-Immune)  5.0-9.9 IU/mL  Indeterminate  10.0-400.0 IU/mL Reactive  (Immune)  These results were obtained with the IMMULITE 2000 Rubella  Quantitative IgG assay. Values obtained from other   manufacturers' assay methods may not be used interchangeably.          Strep B Culture     Normal cervicovaginal marjorie present[P]     WBC   7.62             Physical Exam:   Constitutional: She is oriented to person, place, and time. She appears well-developed and well-nourished.    HENT:   Head: Normocephalic.     Neck: Normal range of motion.     Pulmonary/Chest: Effort normal.        Abdominal: Soft.     Genitourinary: Vagina normal. No vaginal discharge found.           Musculoskeletal: Normal range of motion and moves all extremeties. She exhibits no edema.       Neurological: She is alert and oriented to person, place, and time.    Skin: Skin is warm and dry.    Psychiatric: She has a normal mood and affect. Her behavior is normal. Judgment and thought content normal.

## 2019-07-17 NOTE — PROGRESS NOTES
[facility]  Obstetrics  Labor Progress Note    Patient Name: Dipti Pacheco  MRN: [id number]  Admission Date: 2019  Hospital Length of Stay: 0 days  Attending Physician: Edith Machado MD  Primary Care Provider: Dhruv Celis MD    Subjective:     Principal Problem:<principal problem not specified>    Hospital Course:  Pt sent from prenatal testing to [facility] after NST that had some late decelerations.   Monitor strip in [facility] looked reassuring with only one noted late decel and many accels.  Client checked out AMA just to go home and get belongings and car-seat  Cook cath placed @ 2000 per Dr Smith, 4th year resident without difficulty    Interval History:  Dipti is a 49 y.o.  at 37w1d. She is doing well. Client presents after going home to get belongings after reassuring monitor strip in [facility]. Returned to L%D @ .    Objective:     Vital Signs (Most Recent):  Temp: 97.2 °F (36.2 °C) (19)  Pulse: 66 (19)  Resp: 16 (19)  BP: 118/68 (19)  SpO2: 100 % (19) Vital Signs (24h Range):  Temp:  [97.2 °F (36.2 °C)-97.8 °F (36.6 °C)] 97.2 °F (36.2 °C)  Pulse:  [66-78] 66  Resp:  [16-18] 16  SpO2:  [96 %-100 %] 100 %  BP: (118-134)/(66-85) 118/68     Weight: 80.4 kg (177 lb 4 oz)  Body mass index is 26.86 kg/m².    FHT: 120-126Cat 1 (reassuring), in [facility]  TOCO:  Q occassional contrx    Cervical Exam: @ 1730 in [facility]  Dilation:  0  Effacement:  20%  Station: -2  Presentation: Vertex     Significant Labs:  Lab Results   Component Value Date    GROUPTRH A POS 2019       I have personallly reviewed all pertinent lab results from the last 24 hours.  Recent Lab Results       19  1555        Baso # 0.04     Basophil% 0.5     Differential Method Automated     Eos # 0.1     Eosinophil% 1.4     Gran # (ANC) 5.3     Gran% 69.2     Hematocrit 29.4     Hemoglobin 9.6     Immature Grans (Abs) 0.04  Comment:  Mild elevation in  immature granulocytes is non specific and   can be seen in a variety of conditions including stress response,   acute inflammation, trauma and pregnancy. Correlation with other   laboratory and clinical findings is essential.       Immature Granulocytes 0.5     Lymph # 1.6     Lymph% 20.5     MCH 28.9     MCHC 32.7     MCV 89     Mono # 0.6     Mono% 7.9     MPV 10.9     nRBC 0     Platelets 293     RBC 3.32     RDW 13.2     WBC 7.62               Assessment/Plan:     49 y.o. female  at 37w1d for:    No notes have been filed under this hospital service.  Service: Obstetrics and Gynecology        Wendy D Gerhardt, CNM  Obstetrics  [facility]

## 2019-07-17 NOTE — SUBJECTIVE & OBJECTIVE
Obstetric HPI:  Patient reports None contractions, active fetal movement, No vaginal bleeding , No loss of fluid     This pregnancy has been complicated by AMA and IVF with donor egg and sperm.    OB History    Para Term  AB Living   1 0 0 0 0 0   SAB TAB Ectopic Multiple Live Births   0 0 0 0 0      # Outcome Date GA Lbr Anurag/2nd Weight Sex Delivery Anes PTL Lv   1 Current              Past Medical History:   Diagnosis Date    Allergy     Anxiety     Subclinical hypothyroidism      Past Surgical History:   Procedure Laterality Date    dental implants      Egg retrieval         PTA Medications   Medication Sig    aspirin (ECOTRIN) 81 MG EC tablet Take 81 mg by mouth once daily.    B INFANTIS/B ANI/B JET/B BIFID (PROBIOTIC DIGESTIVE CARE ORAL) Take 1 capsule by mouth as needed.    calcium-vitamin D3 (CALCIUM 500 + D) 500 mg(1,250mg) -200 unit per tablet Take 1 tablet by mouth 2 (two) times daily with meals.    cyanocobalamin, vitamin B-12, (VITAMIN B-12) 50 mcg tablet Take 50 mcg by mouth once daily.    hydrocortisone 2.5 % cream Apply topically 2 (two) times daily.    levothyroxine (SYNTHROID) 50 MCG tablet Take 1 tablet (50 mcg total) by mouth before breakfast.    multivitamin (ONE DAILY MULTIVITAMIN) per tablet Take 1 tablet by mouth once daily.    prenatal vit 55-iron-folic-om3 29-1-430 mg CPKD Take 1 tablet by mouth once daily.    valACYclovir (VALTREX) 500 MG tablet Take 1 tablet (500 mg total) by mouth once daily.       Review of patient's allergies indicates:   Allergen Reactions    Aspirin Nausea Only     Powdered form only         Family History     Problem Relation (Age of Onset)    Alcohol abuse Brother, Paternal Grandfather    Breast cancer Mother (71)    Cancer Mother    Depression Brother    Diabetes Mellitus Paternal Grandmother        Tobacco Use    Smoking status: Never Smoker    Smokeless tobacco: Never Used   Substance and Sexual Activity    Alcohol use: Yes      Comment: socially    Drug use: No    Sexual activity: Not Currently     Partners: Male     Review of Systems   Cardiovascular: Negative for chest pain.   Gastrointestinal: Negative for abdominal pain.   Neurological: Negative for headaches.   All other systems reviewed and are negative.     Objective:     Vital Signs (Most Recent):    Vital Signs (24h Range):  Temp:  [97.8 °F (36.6 °C)] 97.8 °F (36.6 °C)  Pulse:  [66-69] 69  Resp:  [18] 18  SpO2:  [100 %] 100 %  BP: (122-134)/(66-85) 134/79        There is no height or weight on file to calculate BMI.    FHT: 120-128 baseline Cat 1 (reassuring), in [facility]  TOCO:  Q 4-7 minutes    Physical Exam:   Constitutional: She is oriented to person, place, and time. She appears well-developed and well-nourished.    HENT:   Head: Normocephalic.    Eyes: Conjunctivae and EOM are normal.    Neck: Normal range of motion. Neck supple.    Cardiovascular: Normal rate, regular rhythm, normal heart sounds and intact distal pulses.     Pulmonary/Chest: Effort normal and breath sounds normal.        Abdominal: Soft. Bowel sounds are normal.   Gravid, EFW 6.5-7#     Genitourinary: Vagina normal.   Genitourinary Comments: Speculum exam, no lesions noted. Has only been on Valtrex x 2 days. First and last outbreak in 2005.           Musculoskeletal: Normal range of motion.       Neurological: She is alert and oriented to person, place, and time. She has normal reflexes.    Skin: Skin is warm and dry.    Psychiatric: She has a normal mood and affect. Her behavior is normal.       Cervix:  Dilation:  0  Effacement:  20%  Station: -2  Presentation: Vertex     Significant Labs:  Lab Results   Component Value Date    GROUPTR A POS 05/29/2019       I have personallly reviewed all pertinent lab results from the last 24 hours.  Recent Lab Results       07/16/19  1555        Baso # 0.04     Basophil% 0.5     Differential Method Automated     Eos # 0.1     Eosinophil% 1.4     Gran # (ANC) 5.3      Gran% 69.2     Hematocrit 29.4     Hemoglobin 9.6     Immature Grans (Abs) 0.04  Comment:  Mild elevation in immature granulocytes is non specific and   can be seen in a variety of conditions including stress response,   acute inflammation, trauma and pregnancy. Correlation with other   laboratory and clinical findings is essential.       Immature Granulocytes 0.5     Lymph # 1.6     Lymph% 20.5     MCH 28.9     MCHC 32.7     MCV 89     Mono # 0.6     Mono% 7.9     MPV 10.9     nRBC 0     Platelets 293     RBC 3.32     RDW 13.2     WBC 7.62

## 2019-07-18 PROBLEM — Z34.90 ENCOUNTER FOR INDUCTION OF LABOR: Status: RESOLVED | Noted: 2019-07-16 | Resolved: 2019-07-18

## 2019-07-18 LAB — BACTERIA SPEC AEROBE CULT: NORMAL

## 2019-07-18 PROCEDURE — 25000003 PHARM REV CODE 250: Performed by: ADVANCED PRACTICE MIDWIFE

## 2019-07-18 PROCEDURE — 88307 TISSUE SPECIMEN TO PATHOLOGY - SURGERY: ICD-10-PCS | Mod: 26,,, | Performed by: PATHOLOGY

## 2019-07-18 PROCEDURE — 63600175 PHARM REV CODE 636 W HCPCS: Performed by: ADVANCED PRACTICE MIDWIFE

## 2019-07-18 PROCEDURE — 59400 PR FULL ROUT OBSTE CARE,VAGINAL DELIV: ICD-10-PCS | Mod: ,,, | Performed by: ADVANCED PRACTICE MIDWIFE

## 2019-07-18 PROCEDURE — 72200005 HC VAGINAL DELIVERY LEVEL II

## 2019-07-18 PROCEDURE — 88307 TISSUE EXAM BY PATHOLOGIST: CPT | Mod: 26,,, | Performed by: PATHOLOGY

## 2019-07-18 PROCEDURE — 88307 TISSUE EXAM BY PATHOLOGIST: CPT | Performed by: PATHOLOGY

## 2019-07-18 PROCEDURE — 0502F SUBSEQUENT PRENATAL CARE: CPT | Mod: ,,, | Performed by: ADVANCED PRACTICE MIDWIFE

## 2019-07-18 PROCEDURE — 59400 OBSTETRICAL CARE: CPT | Mod: ,,, | Performed by: ADVANCED PRACTICE MIDWIFE

## 2019-07-18 PROCEDURE — 0502F PR SUBSEQUENT PRENATAL CARE: ICD-10-PCS | Mod: ,,, | Performed by: ADVANCED PRACTICE MIDWIFE

## 2019-07-18 PROCEDURE — 11000001 HC ACUTE MED/SURG PRIVATE ROOM

## 2019-07-18 RX ORDER — LEVOTHYROXINE SODIUM 25 UG/1
50 TABLET ORAL
Status: DISCONTINUED | OUTPATIENT
Start: 2019-07-18 | End: 2019-07-20 | Stop reason: HOSPADM

## 2019-07-18 RX ORDER — OXYTOCIN/RINGER'S LACTATE 20/1000 ML
41.65 PLASTIC BAG, INJECTION (ML) INTRAVENOUS CONTINUOUS
Status: ACTIVE | OUTPATIENT
Start: 2019-07-18 | End: 2019-07-18

## 2019-07-18 RX ORDER — DIPHENHYDRAMINE HYDROCHLORIDE 50 MG/ML
25 INJECTION INTRAMUSCULAR; INTRAVENOUS EVERY 4 HOURS PRN
Status: DISCONTINUED | OUTPATIENT
Start: 2019-07-18 | End: 2019-07-20 | Stop reason: HOSPADM

## 2019-07-18 RX ORDER — FOLIC ACID/MULTIVIT,IRON,MINER 0.4MG-18MG
1 TABLET ORAL DAILY
Status: DISCONTINUED | OUTPATIENT
Start: 2019-07-18 | End: 2019-07-18

## 2019-07-18 RX ORDER — ACETAMINOPHEN 325 MG/1
650 TABLET ORAL EVERY 6 HOURS PRN
Status: DISCONTINUED | OUTPATIENT
Start: 2019-07-18 | End: 2019-07-20 | Stop reason: HOSPADM

## 2019-07-18 RX ORDER — CARBOPROST TROMETHAMINE 250 UG/ML
INJECTION, SOLUTION INTRAMUSCULAR
Status: DISCONTINUED
Start: 2019-07-18 | End: 2019-07-18 | Stop reason: WASHOUT

## 2019-07-18 RX ORDER — DOCUSATE SODIUM 100 MG/1
200 CAPSULE, LIQUID FILLED ORAL 2 TIMES DAILY PRN
Status: DISCONTINUED | OUTPATIENT
Start: 2019-07-18 | End: 2019-07-20 | Stop reason: HOSPADM

## 2019-07-18 RX ORDER — DIPHENHYDRAMINE HCL 25 MG
25 CAPSULE ORAL EVERY 4 HOURS PRN
Status: DISCONTINUED | OUTPATIENT
Start: 2019-07-18 | End: 2019-07-20 | Stop reason: HOSPADM

## 2019-07-18 RX ORDER — METHYLERGONOVINE MALEATE 0.2 MG/ML
INJECTION INTRAVENOUS
Status: DISCONTINUED
Start: 2019-07-18 | End: 2019-07-18 | Stop reason: WASHOUT

## 2019-07-18 RX ORDER — IBUPROFEN 600 MG/1
600 TABLET ORAL EVERY 6 HOURS PRN
Status: DISCONTINUED | OUTPATIENT
Start: 2019-07-18 | End: 2019-07-20 | Stop reason: HOSPADM

## 2019-07-18 RX ORDER — HYDROCORTISONE 25 MG/G
CREAM TOPICAL 3 TIMES DAILY PRN
Status: DISCONTINUED | OUTPATIENT
Start: 2019-07-18 | End: 2019-07-20 | Stop reason: HOSPADM

## 2019-07-18 RX ORDER — ONDANSETRON 8 MG/1
8 TABLET, ORALLY DISINTEGRATING ORAL EVERY 8 HOURS PRN
Status: DISCONTINUED | OUTPATIENT
Start: 2019-07-18 | End: 2019-07-20 | Stop reason: HOSPADM

## 2019-07-18 RX ORDER — PRENATAL WITH FERROUS FUM AND FOLIC ACID 3080; 920; 120; 400; 22; 1.84; 3; 20; 10; 1; 12; 200; 27; 25; 2 [IU]/1; [IU]/1; MG/1; [IU]/1; MG/1; MG/1; MG/1; MG/1; MG/1; MG/1; UG/1; MG/1; MG/1; MG/1; MG/1
1 TABLET ORAL DAILY
Status: DISCONTINUED | OUTPATIENT
Start: 2019-07-18 | End: 2019-07-20 | Stop reason: HOSPADM

## 2019-07-18 RX ADMIN — ACETAMINOPHEN 650 MG: 325 TABLET, FILM COATED ORAL at 08:07

## 2019-07-18 RX ADMIN — Medication 41.65 MILLI-UNITS/MIN: at 02:07

## 2019-07-18 RX ADMIN — PRENATAL VIT W/ FE FUMARATE-FA TAB 27-0.8 MG 1 TABLET: 27-0.8 TAB at 09:07

## 2019-07-18 RX ADMIN — DOCUSATE SODIUM 200 MG: 100 CAPSULE, LIQUID FILLED ORAL at 08:07

## 2019-07-18 RX ADMIN — LEVOTHYROXINE SODIUM 50 MCG: 25 TABLET ORAL at 06:07

## 2019-07-18 RX ADMIN — HYDROCORTISONE 2.5%: 25 CREAM TOPICAL at 02:07

## 2019-07-18 RX ADMIN — CEFTRIAXONE 2 G: 2 INJECTION, SOLUTION INTRAVENOUS at 04:07

## 2019-07-18 RX ADMIN — ACETAMINOPHEN 650 MG: 325 TABLET, FILM COATED ORAL at 02:07

## 2019-07-18 RX ADMIN — DOCUSATE SODIUM 200 MG: 100 CAPSULE, LIQUID FILLED ORAL at 02:07

## 2019-07-18 NOTE — DISCHARGE INSTRUCTIONS
Breastfeeding Discharge Instructions       Feed the baby at the earliest sign of hunger or comfort  o Hands to mouth, sucking motions  o Rooting or searching for something to suck on  o Dont wait for crying - it is a sign of distress     The feedings may be 8-12 times per 24hrs and will not follow a schedule   Avoid pacifiers and bottles for the first 4 weeks   Alternate the breast you start the feeding with, or start with the breast that feels the fullest   Switch breasts when the baby takes himself off the breast or falls asleep   Keep offering breasts until the baby looks full, no longer gives hunger signs, and stays asleep when placed on his back in the crib   If the baby is sleepy and wont wake for a feeding, put the baby skin-to-skin dressed in a diaper against the mothers bare chest   Sleep near your baby   The baby should be positioned and latched on to the breast correctly  o Chest-to-chest, chin in the breast  o Babys lips are flipped outward  o Babys mouth is stretched open wide like a shout  o Babys sucking should feel like tugging to the mother  - The baby should be drinking at the breast:  o You should hear swallowing or gulping throughout the feeding  o You should see milk on the babys lips when he comes off the breast  o Your breasts should be softer when the baby is finished feeding  o The baby should look relaxed at the end of feedings  o After the 4th day and your milk is in:  o The babys poop should turn bright yellow and be loose, watery, and seedy  o The baby should have at least 3-4 poops the size of the palm of your hand per day  o The baby should have at least 5-6 wet diapers per day  o The urine should be light yellow in color  You should drink when you are thirsty and eat a healthy diet when you are    hungry.     Take naps to get the rest you need.   Take medications and/or drink alcohol only with permission of your obstetrician    or the babys pediatrician.  You can  also call the Infant Risk Center,   ([phone]), Monday-Friday, 8am-5pm Central time, to get the most   up-to-date evidence-based information on the use of medications during   pregnancy and breastfeeding.      The baby should be examined by a pediatrician at 3-5 days of age.  Once your   milk comes in, the baby should be gaining at least ½ - 1oz each day and should be back to birthweight no later than 10-14 days of age.          Community Resources    [facility] Breastfeeding Warmline: [phone]   Local [facility] clinics: provide incentives and breastpumps to eligible mothers  [employer] Leche [employer] (LLLI):  mother-to-mother support group website        www.H-art (WPP)l.Microtune  Local La Leche League mother-to-mother support groups:        www.i.am.plus electronics        La Leche League [facility]   Dr. Marvel Aleman website for latch videos and general information:        www.breastfeedinginc.ca  [facility] Risk Center is a call center that provides information about the safety of taking medications while breastfeeding.  Call 1-367.725.1737, M-F, 8am-5pm, [place].  International Lactation Consultant Association provides resources for assistance:        www.ilca.org  [place] Breastfeeding Coalition provides informationand resources for parents  and the community    www.LaBreastfeedingSupport.org     Regina Santiago is a mom-to-mom support group:                             www.gridCommcarloRidePost.com//breastfeedng-support/  Partners for Healthy Babies:  2-901-710-BABY(7341)  Cafe au Lait: a breastfeeding support group for women of color, [phone]

## 2019-07-18 NOTE — LACTATION NOTE
07/18/19 1200   Maternal Assessment   Breast Shape Bilateral:;round   Breast Density Bilateral:;soft   Areola Bilateral:;elastic   Nipples Bilateral:;everted   Maternal Infant Feeding   Maternal Emotional State assist needed   Latch Assistance other (see comments)  (no latch, infant sleepy, hand expressed)   Breast Pumping   Breast Pumping other (see comments)  (hand expression encouraged)   Basic lactation education reviewed. Attempted to wake baby and latch, too sleepy. LC assisted with hand expression, 2ml EBM spoonfed. Encouraged STS and to call for latch assistance when infant showing hunger cues. Discussed 37 week gestation and to wake for feedings, if too sleepy, continue to hand express and spoonfeed EBM. Discussed possibility of pumping for stimulation after 24hrs. LC number on board, encouraged to call for feeding.

## 2019-07-18 NOTE — ANESTHESIA PROCEDURE NOTES
CSE    Patient location during procedure: OB  Start time: 7/17/2019 10:50 AM  Timeout: 7/17/2019 10:48 AM  End time: 7/17/2019 10:57 AM    Staffing  Authorizing Provider: Edith Mercado MD  Performing Provider: Shannon Silverman MD    Preanesthetic Checklist  Completed: patient identified, site marked, surgical consent, pre-op evaluation, timeout performed, IV checked, risks and benefits discussed and monitors and equipment checked  CSE  Patient position: sitting  Prep: ChloraPrep  Patient monitoring: heart rate, cardiac monitor, frequent blood pressure checks and continuous pulse ox  Approach: midline  Spinal Needle  Needle type: Anne   Needle gauge: 25 G  Needle length: 5 in  Epidural Needle  Injection technique: LENORE air  Needle type: Tuohy   Needle gauge: 17 G  Needle length: 3.5 in  Needle insertion depth: 5 cm  Location: L3-4  Needle localization: anatomical landmarks  Catheter  Catheter type: springwKu  Catheter size: 19 G  Catheter at skin depth: 9 cm  Test dose: lidocaine 1.5% with Epi 1-to-200,000  Additional Documentation: incremental injection and negative aspiration for CSF  Assessment  Sensory level: T8   Dermatomal levels determined by ice

## 2019-07-18 NOTE — PROGRESS NOTES
"LABOR NOTE    S:  Pt on hands and knees, rocking quietly through UCs.  Unable to talk through UCs now and reports lasting longer/becoming stronger. Appears calm and to be coping very well.  Requests VE. Friend still at bedside and supportive.     O: BP (!) 104/59   Pulse 79   Temp 98.1 °F (36.7 °C) (Oral)   Resp 18   Ht 5' 8.11" (1.73 m)   Wt 80.4 kg (177 lb 4 oz)   LMP 2018   SpO2 100%   Breastfeeding? No   BMI 26.86 kg/m²     GENERAL: Calm and appropriate affect  NEURO: Alert, oriented, normal speech  ABDOMEN: Nontender, Fundus palpates soft between UC's.  FHT: Baseline 130, moderate BTBV, positive accels, no decels. Cat 1, reassuring.  CTX: q 2-3 minutes  SVE: 470/-2, BBOW.       ASSESSMENT:   49 y.o.  IUP at 37w2d, FHT reassuring/ Cat 1    Patient Active Problem List   Diagnosis    Mouth ulcers    Leukoplakia    Secondary physiologic amenorrhea    Low bone mass    Hearing loss sensory, bilateral    Posture imbalance    Supervision of high risk pregnancy in third trimester    High risk pregnancy due to assisted reproductive technology in second trimester    Elderly primigravida in third trimester    Non-stress test with decelerations    Encounter for induction of labor    Chorioamnionitis in third trimester         PLAN:  Improved status following initial dose IV antibiotics.  Discussed potential benefits of AROM, along with potential risks and pt desires. AROM performed using amniohook - clear fluid noted.  Continue close Maternal/Fetal monitoring  Pitocin Augmentation per protocol - currently infusing at 6mu  Recheck 2 hours or PRN  Epidural per anesthesia at pt's request      Kamilah Chaidez CNM    "

## 2019-07-18 NOTE — L&D DELIVERY NOTE
[facility]  Vaginal Delivery       SUMMARY     Spontaneous vaginal delivery of viable male infant, LOP, at 0243. Vigorous infant handed immediately to maternal chest and waiting nursery staff. Apgars 9/9, wt: 6lb 2.8oz., over a 2nd degree perineum.   Placenta delivered spontaneously and intact via [place], 3 vessel cord, at 0257. Fundus firm, uterus intact.  2nd degree perineal laceration repaired with 2-0 vicryl under epidural anesthesia.   EBL 175mL.    Pitocin 20U in LR 1000mL infusing.   Mom and baby stable, with baby skin to skin and family bonding well.  Patient tolerated delivery well. Sponge needle and lap counted correctly x2.    Kamilah Chaidez CNM    Indications: Encounter for induction of labor  Pregnancy complicated by:   Patient Active Problem List   Diagnosis    Mouth ulcers    Leukoplakia    Secondary physiologic amenorrhea    Low bone mass    Hearing loss sensory, bilateral    Posture imbalance    Supervision of high risk pregnancy in third trimester    High risk pregnancy due to assisted reproductive technology in second trimester    Elderly primigravida in third trimester    Non-stress test with decelerations    Encounter for induction of labor    Chorioamnionitis in third trimester     Admitting GA: 37w3d    Delivery Information for  Christopher Pacheco    Birth information:  YOB: 2018   Time of birth: 2:43 AM   Sex: male   Head Delivery Date/Time: 7/18/2019  2:43 AM   Delivery type: Vaginal, Spontaneous   Gestational Age: 37w3d    Delivery Providers    Delivering clinician:  Kamilah Chaidez CNM   Provider Role    EPIFANIO Torres RN             Measurements    Weight:  2800 g  Length:           Apgars    Living status:  Living  Apgars:   1 min.:   5 min.:   10 min.:   15 min.:   20 min.:     Skin color:   1  1       Heart rate:   2  2       Reflex irritability:   2  2       Muscle tone:   2  2       Respiratory effort:   2  2        Total:   9  9       Apgars assigned by:  LINDA ALDRICH RN         Operative Delivery    Forceps attempted?:  No  Vacuum extractor attempted?:  No         Shoulder Dystocia    Shoulder dystocia present?:  No           Presentation    Presentation:  Vertex           Interventions/Resuscitation    Method:  Bulb Suctioning, Tactile Stimulation       Cord    Vessels:  3 vessels  Complications:  None  Delayed Cord Clamping?:  Yes  Cord Clamped Date/Time:  2019  2:48 AM  Cord Blood Disposition:  Sent with Baby  Gases Sent?:  No  Stem Cell Collection (by MD):  No       Placenta    Placenta delivery date/time:  2019 0257  Placenta removal:  Spontaneous  Placenta appearance:  Intact  Placenta disposition:  pathology           Labor Events:       labor: No     Labor Onset Date/Time:         Dilation Complete Date/Time:         Start Pushing Date/Time:       Rupture Date/Time:              Rupture type:           Fluid Amount:        Fluid Color:        Fluid Odor:        Membrane Status (PeriCalm): ARM (Artificial Rupture)      Rupture Date/Time (PeriCalm):        Fluid Amount (PeriCalm): Moderate      Fluid Color (PeriCalm): Clear       steroids: None     Antibiotics given for GBS: No     Induction: oxytocin     Indications for induction:  Fetal Heart Rate or Rhythm Abnormality     Augmentation: amniotomy;oxytocin     Indications for augmentation: Ineffective Contraction Pattern     Labor complications: Chorioamnionitis     Additional complications:          Cervical ripening:                     Delivery:      Episiotomy: None     Indication for Episiotomy:       Perineal Lacerations: 2nd Repaired:  Yes   Periurethral Laceration:   Repaired:     Labial Laceration:   Repaired:     Sulcus Laceration:   Repaired:     Vaginal Laceration:   Repaired:     Cervical Laceration:   Repaired:     Repair suture:       Repair # of packets: 1     Last Value - EBL - Nursing (mL): 175     Sum - EBL - Nursing (mL):  175     Last Value - EBL - Anesthesia (mL):      Calculated QBL (mL):        Vaginal Sweep Performed:       Surgicount Correct: Yes       Other providers:       Anesthesia    Method:  Epidural          Details (if applicable):  Trial of Labor      Categorization:      Priority:     Indications for :     Incision Type:       Additional  information:  Forceps:    Vacuum:    Breech:    Observed anomalies    Other (Comments):

## 2019-07-18 NOTE — PROGRESS NOTES
"LABOR NOTE    S:  Pt resting with epidural and reports feeling pressure and some contractions, has pushed her epidural bolus button with minimal relief. Carmella, her friend, at bedside and supportive.     O: /62   Pulse 75   Temp 97.9 °F (36.6 °C) (Oral)   Resp 16   Ht 5' 8.11" (1.73 m)   Wt 80.4 kg (177 lb 4 oz)   LMP 2018   SpO2 97%   Breastfeeding? No   BMI 26.86 kg/m²     GENERAL: Calm and appropriate affect  NEURO: Alert, oriented, normal speech  ABDOMEN: Nontender, Fundus palpates soft between UC's.  FHT: Baseline 120, moderate BTBV, positive accels, variable decels. Cat 2.  CTX: q 2-4 minutes  SVE: 10/100/+2      ASSESSMENT:   49 y.o.  IUP at 37w3d, Cat 2    Patient Active Problem List   Diagnosis    Mouth ulcers    Leukoplakia    Secondary physiologic amenorrhea    Low bone mass    Hearing loss sensory, bilateral    Posture imbalance    Supervision of high risk pregnancy in third trimester    High risk pregnancy due to assisted reproductive technology in second trimester    Elderly primigravida in third trimester    Non-stress test with decelerations    Encounter for induction of labor    Chorioamnionitis in third trimester         PLAN:  Set up for delivery and begin pushing. Pt reports relief of contraction discomfort with repositioning, no c/o regarding mild feelings of pressure with UCs. Good urge to push and strong effort.  Anticipate .   Continue close Maternal/Fetal monitoring  Pitocin Augmentation per protocol - currently infusing at 10mu        Kamilah Chaidez CNM    "

## 2019-07-19 PROBLEM — D50.8 IRON DEFICIENCY ANEMIA SECONDARY TO INADEQUATE DIETARY IRON INTAKE: Status: ACTIVE | Noted: 2019-07-19

## 2019-07-19 LAB
BASOPHILS # BLD AUTO: 0.07 K/UL (ref 0–0.2)
BASOPHILS NFR BLD: 0.8 % (ref 0–1.9)
DIFFERENTIAL METHOD: ABNORMAL
EOSINOPHIL # BLD AUTO: 0.3 K/UL (ref 0–0.5)
EOSINOPHIL NFR BLD: 3.2 % (ref 0–8)
ERYTHROCYTE [DISTWIDTH] IN BLOOD BY AUTOMATED COUNT: 13.4 % (ref 11.5–14.5)
HCT VFR BLD AUTO: 28.7 % (ref 37–48.5)
HGB BLD-MCNC: 9.4 G/DL (ref 12–16)
IMM GRANULOCYTES # BLD AUTO: 0.04 K/UL (ref 0–0.04)
IMM GRANULOCYTES NFR BLD AUTO: 0.5 % (ref 0–0.5)
LYMPHOCYTES # BLD AUTO: 1.7 K/UL (ref 1–4.8)
LYMPHOCYTES NFR BLD: 20.6 % (ref 18–48)
MCH RBC QN AUTO: 29.4 PG (ref 27–31)
MCHC RBC AUTO-ENTMCNC: 32.8 G/DL (ref 32–36)
MCV RBC AUTO: 90 FL (ref 82–98)
MONOCYTES # BLD AUTO: 0.6 K/UL (ref 0.3–1)
MONOCYTES NFR BLD: 6.7 % (ref 4–15)
NEUTROPHILS # BLD AUTO: 5.8 K/UL (ref 1.8–7.7)
NEUTROPHILS NFR BLD: 68.2 % (ref 38–73)
NRBC BLD-RTO: 0 /100 WBC
PLATELET # BLD AUTO: 270 K/UL (ref 150–350)
PMV BLD AUTO: 10.4 FL (ref 9.2–12.9)
RBC # BLD AUTO: 3.2 M/UL (ref 4–5.4)
WBC # BLD AUTO: 8.46 K/UL (ref 3.9–12.7)

## 2019-07-19 PROCEDURE — 85025 COMPLETE CBC W/AUTO DIFF WBC: CPT

## 2019-07-19 PROCEDURE — 11000001 HC ACUTE MED/SURG PRIVATE ROOM

## 2019-07-19 PROCEDURE — 25000003 PHARM REV CODE 250: Performed by: ADVANCED PRACTICE MIDWIFE

## 2019-07-19 PROCEDURE — 36415 COLL VENOUS BLD VENIPUNCTURE: CPT

## 2019-07-19 RX ORDER — IRON POLYSACCHARIDE COMPLEX 150 MG
150 CAPSULE ORAL 2 TIMES DAILY
Status: DISCONTINUED | OUTPATIENT
Start: 2019-07-19 | End: 2019-07-20 | Stop reason: HOSPADM

## 2019-07-19 RX ADMIN — LEVOTHYROXINE SODIUM 50 MCG: 25 TABLET ORAL at 05:07

## 2019-07-19 RX ADMIN — PRENATAL VIT W/ FE FUMARATE-FA TAB 27-0.8 MG 1 TABLET: 27-0.8 TAB at 08:07

## 2019-07-19 RX ADMIN — DOCUSATE SODIUM 200 MG: 100 CAPSULE, LIQUID FILLED ORAL at 12:07

## 2019-07-19 RX ADMIN — ACETAMINOPHEN 650 MG: 325 TABLET, FILM COATED ORAL at 05:07

## 2019-07-19 NOTE — ANESTHESIA POSTPROCEDURE EVALUATION
Anesthesia Post Evaluation    Patient: Dipti Pacheco    Procedure(s) Performed: * No procedures listed *    Final Anesthesia Type: CSE  Patient location during evaluation: labor & delivery  Patient participation: Yes- Able to Participate  Level of consciousness: awake and alert, oriented and awake  Post-procedure vital signs: reviewed and stable  Pain management: adequate  Airway patency: patent  PONV status at discharge: No PONV  Anesthetic complications: no      Cardiovascular status: blood pressure returned to baseline  Respiratory status: unassisted, spontaneous ventilation and room air  Hydration status: euvolemic  Follow-up not needed.          Vitals Value Taken Time   /80 7/19/2019  7:54 AM   Temp 36.6 °C (97.8 °F) 7/19/2019  7:54 AM   Pulse 74 7/19/2019  7:54 AM   Resp 18 7/19/2019  7:54 AM   SpO2 98 % 7/19/2019  7:54 AM         No case tracking events are documented in the log.      Pain/Griselda Score: Pain Rating Prior to Med Admin: 0 (7/19/2019 10:17 AM)  Pain Rating Post Med Admin: 0 (7/19/2019  6:10 AM)

## 2019-07-19 NOTE — PROGRESS NOTES
[facility]  Obstetrics  Postpartum Progress Note    Patient Name: Dipti Pacheco  MRN: [id number]  Admission Date: 2019  Hospital Length of Stay: 3 days  Attending Physician: Edith Machado MD  Primary Care Provider: Dhruv Celis MD    Subjective:     Principal Problem: (normal spontaneous vaginal delivery)    Hospital course: 2019 - IOL for NRFHTs per MFM. Cook cath placed @ 19 1000, cook dilator removed, cervix 3/70/-2, will augment w/ pitocin. 1730 Chorioamnionitis dx - gent/clinda started.  2019 -  of male infant. 2nd degree lac. EBL: 175    Interval History:PPD#1 s/p  afebrile VSS    She is doing well this morning. She is tolerating a regular diet without nausea or vomiting. She is voiding spontaneously. She is ambulating. She has passed flatus, and has a BM. Vaginal bleeding is mild. She denies fever or chills. Abdominal pain is mild and controlled with oral medications. She is breastfeeding. She desires circumcision for her male baby: no.  Has some discomfort from hemorrhoids using hydrocortisone cream at this time and getting relief.     Objective:     Vital Signs (Most Recent):  Temp: 97.8 °F (36.6 °C) (19 0754)  Pulse: 74 (19 0754)  Resp: 18 (19 0754)  BP: 126/80 (19 0754)  SpO2: 98 % (19 0754) Vital Signs (24h Range):  Temp:  [97.8 °F (36.6 °C)-98 °F (36.7 °C)] 97.8 °F (36.6 °C)  Pulse:  [68-74] 74  Resp:  [18] 18  SpO2:  [98 %] 98 %  BP: ()/(59-80) 126/80     Weight: 80.4 kg (177 lb 4 oz)  Body mass index is 26.86 kg/m².    No intake or output data in the 24 hours ending 19 1126  Physical Exam   Constitutional: She is oriented to person, place, and time and well-developed, well-nourished, and in no distress.   HENT:   Head: Normocephalic and atraumatic.   Eyes: Conjunctivae are normal.   Neck: Normal range of motion. Neck supple.   Cardiovascular: Normal rate and intact distal pulses.    Pulmonary/Chest: Effort normal.   Normal chest rise, no evidence of labored breathing.  Breasts: Nipples without bleeding/ cracking;pink and soft, filling   Abdominal: Soft.   Fundus firm and u.   Genitourinary:   Genitourinary Comments: Lochia scant rubra, perineum c/d/i, no edema. Hemorrhoids present and pink, soft, without evidence of thrombosis.    Musculoskeletal: Normal range of motion.   Neurological: She is alert and oriented to person, place, and time. Gait normal.   Skin: Skin is warm and dry.   Psychiatric: Mood, memory, affect and judgment normal.           Significant Labs:  Lab Results   Component Value Date    GROUPTRH A POS 2019    STREPBCULT No Group B Streptococcus isolated 2019     Recent Labs   Lab 19  1013   HGB 9.4*   HCT 28.7*         CBC:   Recent Labs   Lab 19  1013   WBC 8.46   RBC 3.20*   HGB 9.4*   HCT 28.7*      MCV 90   MCH 29.4   MCHC 32.8     I have personallly reviewed all pertinent lab results from the last 24 hours.    Assessment/Plan:     49 y.o. female  at 37w3d for:    Active Diagnoses:    Diagnosis Date Noted POA    PRINCIPAL PROBLEM:   (normal spontaneous vaginal delivery) [O80] 2019 Not Applicable    Iron deficiency anemia secondary to inadequate dietary iron intake [D50.8] 2019 Unknown    Obstetrical laceration, second degree [O70.1] 2019 No    Breast feeding status of mother [Z39.1] 2019 Not Applicable    Chorioamnionitis in third trimester [O41.1230] 2019 Clinically Undetermined    High risk pregnancy due to assisted reproductive technology in second trimester [O09.812] 2019 Not Applicable      Problems Resolved During this Admission:    Diagnosis Date Noted Date Resolved POA    Encounter for induction of labor [Z34.90] 2019 Not Applicable     Plan:  Ordered Iron for low hemoglobin (chronic issue), continue routine pp care.     iron polysaccharides  150 mg Oral BID     levothyroxine  50 mcg Oral Before breakfast    prenatal vitamin  1 tablet Oral Daily           Disposition: As patient meets milestones, will plan to discharge tomorrow 7/20/19  Ashly Jung CNM  Obstetrics  [facility]

## 2019-07-19 NOTE — PLAN OF CARE
Problem: Adult Inpatient Plan of Care  Goal: Plan of Care Review  Outcome: Ongoing (interventions implemented as appropriate)  Pt ambulating well. Voiding. Pain well tolerated with PO meds. No acute changes this shift.

## 2019-07-20 VITALS
OXYGEN SATURATION: 99 % | DIASTOLIC BLOOD PRESSURE: 68 MMHG | HEIGHT: 68 IN | HEART RATE: 67 BPM | RESPIRATION RATE: 18 BRPM | WEIGHT: 177.25 LBS | TEMPERATURE: 98 F | SYSTOLIC BLOOD PRESSURE: 112 MMHG | BODY MASS INDEX: 26.86 KG/M2

## 2019-07-20 PROBLEM — O41.1230 CHORIOAMNIONITIS IN THIRD TRIMESTER: Status: RESOLVED | Noted: 2019-07-17 | Resolved: 2019-07-20

## 2019-07-20 PROBLEM — O09.93 SUPERVISION OF HIGH RISK PREGNANCY IN THIRD TRIMESTER: Status: RESOLVED | Noted: 2019-02-25 | Resolved: 2019-07-20

## 2019-07-20 PROBLEM — O09.812 HIGH RISK PREGNANCY DUE TO ASSISTED REPRODUCTIVE TECHNOLOGY IN SECOND TRIMESTER: Status: RESOLVED | Noted: 2019-02-25 | Resolved: 2019-07-20

## 2019-07-20 PROBLEM — O28.8 NON-STRESS TEST WITH DECELERATIONS: Status: RESOLVED | Noted: 2019-07-16 | Resolved: 2019-07-20

## 2019-07-20 PROCEDURE — 25000003 PHARM REV CODE 250: Performed by: ADVANCED PRACTICE MIDWIFE

## 2019-07-20 RX ORDER — DOCUSATE SODIUM 100 MG/1
100 CAPSULE, LIQUID FILLED ORAL 2 TIMES DAILY PRN
Qty: 28 CAPSULE | Refills: 0 | COMMUNITY
Start: 2019-07-20 | End: 2019-08-03

## 2019-07-20 RX ORDER — FERROUS SULFATE 325(65) MG
325 TABLET ORAL DAILY
Qty: 45 TABLET | Refills: 0 | COMMUNITY
Start: 2019-07-20 | End: 2019-08-06

## 2019-07-20 RX ADMIN — LEVOTHYROXINE SODIUM 50 MCG: 25 TABLET ORAL at 05:07

## 2019-07-20 RX ADMIN — ACETAMINOPHEN 650 MG: 325 TABLET, FILM COATED ORAL at 12:07

## 2019-07-20 RX ADMIN — ACETAMINOPHEN 650 MG: 325 TABLET, FILM COATED ORAL at 05:07

## 2019-07-20 RX ADMIN — PRENATAL VIT W/ FE FUMARATE-FA TAB 27-0.8 MG 1 TABLET: 27-0.8 TAB at 08:07

## 2019-07-20 RX ADMIN — DOCUSATE SODIUM 200 MG: 100 CAPSULE, LIQUID FILLED ORAL at 12:07

## 2019-07-20 NOTE — ASSESSMENT & PLAN NOTE
Pt states she is very constipated and would to avoid po iron if possible. Sending home with iron and colace in case she decides to take but would like to try iron rich foods daily first. Reviewed rationale, etc.

## 2019-07-20 NOTE — LACTATION NOTE
07/20/19 0900   Breasts WDL   Left Nipple Symptoms tender   Right Nipple Symptoms tender   Breast Pumping   Breast Pumping manual breast pump utilized;double electric breast pump utilized  (pumpafter feeding to stimulate , use EBM prn )   Breast Pumping Interventions post-feed pumping encouraged;other (see comments)  (as needed for comfort)   Maternal Feeding Assessment   Maternal Emotional State assist needed   Latch Assistance yes   Pain with Feeding no  (tender inital latch then decreases)   Signs of Milk Transfer audible swallow;breasts soften with feeding;infant jaw motion present   Infant Positioning clutch/football;cross-cradle   Comfort Measures Before/During Feeding infant position adjusted;latch adjusted;maternal position adjusted   Reproductive Interventions   Breast Care: Breastfeeding open to air   Breastfeeding Assistance assisted with positioning;feeding cue recognition promoted;feeding on demand promoted;feeding session observed;infant latch-on verified;infant suck/swallow verified   Breastfeeding Support diary/feeding log utilized;encouragement provided;infant-mother separation minimized;lactation counseling provided;maternal hydration promoted;maternal nutrition promoted;maternal rest encouraged   lactation discharge education reviewed. Latch assistance provided, assisted pt with latching infant in deep latch. Coached pt on use of breast compression and infant stimulation. Breast pump utilized for stimulation and manual pump given for home use. Pt to pump for comfort and use EBM as needed. Hydrogels for sore nipples.

## 2019-07-20 NOTE — DISCHARGE SUMMARY
[facility]  Obstetrics  Discharge Summary      Patient Name: Dipti Pacheco  MRN: [id number]  Admission Date: 2019  Hospital Length of Stay: 4 days  Discharge Date and Time:  2019 11:30 AM  Attending Physician: Edith Machado MD   Discharging Provider: Dang Salmeron CNM   Primary Care Provider: Dhruv Celis MD    HPI:   History of Present Illness:   Dipti Pacheco is a 49 y.o. female  at 37w1d with Estimated Date of Delivery: 19 based on ultrasound and IVF who presents to Labor and Delivery accompanied by friend.     Reports no regular uterine contractions. They are now irreg. minutes apart and increasing in intensity and duration.  mild contractions, active fetal movement, No vaginal bleeding , No loss of fluid.     Last ate 19 at 2000, last drank 19 at 2200.     This pregnancy has been complicated by AMA, IVF, decelerations on NST  Patient Active Problem List   Diagnosis    Mouth ulcers    Leukoplakia    Secondary physiologic amenorrhea    Low bone mass    Hearing loss sensory, bilateral    Posture imbalance    Supervision of high risk pregnancy in third trimester    High risk pregnancy due to assisted reproductive technology in second trimester    Elderly primigravida in third trimester    Non-stress test with decelerations    Encounter for induction of labor        Presentation: vtx  Estimated Fetal Weight: 6.5-7#    Birth Center Risk Assessment: 1-management on labor and Delivery, for IOL    0- CNM management in ABC  1- CNM management on L&D  2- Consultation with OB to develop  plan of care  3- Collaborative CNM/OB management with delivery on L&D  4- Permanent referral of care to MD          * No surgery found *     Hospital Course:   2019 - IOL for NRFHTs per MFM. Cook cath placed @ 19 1000, cook dilator removed, cervix 3/70/-2, will augment w/ pitocin. 1730 Chorioamnionitis dx - gent/clinda  started.  2019 -  of male infant. 2nd degree lac. EBL: 175  2019 @ 1130: D/C home.       Doing well, ambulating, voiding, and tolerating regular diet  Lochia: steadily decreasing  Pain: well controlled with Tylenol   Breasts/nipples: breast feeding well without difficulty; has seen lactation  Depression/anxiety: denies   Support at home: good  : Sherman is doing well, will f/u with pediatrician     Gen: A&O x 4, NAD  CV: normal HR  Lungs: normal resp effort  Breasts: bilaterally soft, non-tender, nipples intact  Abdomen: soft, non-tender, uterus firm at U - 1 fb  Perineum: approximated, no edema   Lochia: minimal rubra  Ext: bilaterally no pedal edema without signs of DVT     Consults (From admission, onward)        Status Ordering Provider     Consult to Lactation  Use PRN     Provider:  (Not yet assigned)    Acknowledged JESSIE SIFUENTES          Final Active Diagnoses:    Diagnosis Date Noted POA    PRINCIPAL PROBLEM:   (normal spontaneous vaginal delivery) [O80] 2019 Not Applicable    Iron deficiency anemia secondary to inadequate dietary iron intake [D50.8] 2019 Yes    Obstetrical laceration, second degree [O70.1] 2019 No    Breast feeding status of mother [Z39.1] 2019 Not Applicable      Problems Resolved During this Admission:    Diagnosis Date Noted Date Resolved POA    Chorioamnionitis in third trimester [O41.1230] 2019 No    Encounter for induction of labor [Z34.90] 2019 Not Applicable    High risk pregnancy due to assisted reproductive technology in second trimester [O09.812] 2019 Not Applicable        Labs: All labs within the past 24 hours have been reviewed    Feeding Method: breast    Immunizations     Date Immunization Status Dose Route/Site Given by    19 3310 Tdap Given 0.5 mL Intramuscular/Left deltoid Kandis Holden LPN    19 4750 MMR Incomplete 0.5 mL Subcutaneous/Left deltoid      07/18/19 0429 Tdap Incomplete 0.5 mL Intramuscular/Left deltoid           Delivery:    Episiotomy: None   Lacerations: 2nd   Repair suture:     Repair # of packets: 1   Blood loss (ml): 175     Birth information:  YOB: 2018   Time of birth: 2:43 AM   Sex: male   Delivery type: Vaginal, Spontaneous   Gestational Age: 37w3d    Delivery Clinician:      Other providers:       Additional  information:  Forceps:    Vacuum:    Breech:    Observed anomalies      Living?:           APGARS  One minute Five minutes Ten minutes   Skin color:         Heart rate:         Grimace:         Muscle tone:         Breathing:         Totals: 9  9        Placenta: Delivered:       appearance    Pending Diagnostic Studies:     None          Discharged Condition: good    Disposition: Home or Self Care    Follow Up:  Follow-up Information     Dang Salmeron CNM In 5 weeks.    Specialty:  Obstetrics and Gynecology  Why:  PP visit   Contact information:  [address] [place] [id number]  [phone]                 Patient Instructions:      Diet Adult Regular     Notify your health care provider if you experience any of the following:  temperature >100.4     Notify your health care provider if you experience any of the following:  persistent nausea and vomiting or diarrhea     Notify your health care provider if you experience any of the following:  severe uncontrolled pain     Notify your health care provider if you experience any of the following:  redness, tenderness, or signs of infection (pain, swelling, redness, odor or green/yellow discharge around incision site)     Notify your health care provider if you experience any of the following:  difficulty breathing or increased cough     Notify your health care provider if you experience any of the following:  severe persistent headache     Notify your health care provider if you experience any of the following:  worsening rash     Notify your health care  provider if you experience any of the following:  persistent dizziness, light-headedness, or visual disturbances     Notify your health care provider if you experience any of the following:  increased confusion or weakness     Activity as tolerated     Medications:  Current Discharge Medication List      START taking these medications    Details   docusate sodium (COLACE) 100 MG capsule Take 1 capsule (100 mg total) by mouth 2 (two) times daily as needed for Constipation.  Qty: 28 capsule, Refills: 0      ferrous sulfate (FEOSOL) 325 mg (65 mg iron) Tab tablet Take 1 tablet (325 mg total) by mouth once daily.  Qty: 45 tablet, Refills: 0         CONTINUE these medications which have NOT CHANGED    Details   B INFANTIS/B ANI/B JET/B BIFID (PROBIOTIC DIGESTIVE CARE ORAL) Take 1 capsule by mouth as needed.      calcium-vitamin D3 (CALCIUM 500 + D) 500 mg(1,250mg) -200 unit per tablet Take 1 tablet by mouth 2 (two) times daily with meals.      cyanocobalamin, vitamin B-12, (VITAMIN B-12) 50 mcg tablet Take 50 mcg by mouth once daily.      hydrocortisone 2.5 % cream Apply topically 2 (two) times daily.  Qty: 28 g, Refills: 3    Associated Diagnoses: Skin pruritus      levothyroxine (SYNTHROID) 50 MCG tablet Take 1 tablet (50 mcg total) by mouth before breakfast.  Qty: 30 tablet, Refills: 11    Associated Diagnoses: Hypothyroidism affecting pregnancy in third trimester      multivitamin (ONE DAILY MULTIVITAMIN) per tablet Take 1 tablet by mouth once daily.      prenatal vit 55-iron-folic-om3 29-1-430 mg CPKD Take 1 tablet by mouth once daily.         STOP taking these medications       aspirin (ECOTRIN) 81 MG EC tablet Comments:   Reason for Stopping:         valACYclovir (VALTREX) 500 MG tablet Comments:   Reason for Stopping:               Dang Salmeron CNM  Obstetrics  [facility]

## 2019-07-20 NOTE — PLAN OF CARE
Problem: Breastfeeding  Goal: Effective Breastfeeding  Outcome: Ongoing (interventions implemented as appropriate)  Follow breastfeeding discharge education. Pump as needed for extra stimulation, comfort, and use EBM as needed. Symphony pump , manual pump

## 2019-07-23 ENCOUNTER — PATIENT MESSAGE (OUTPATIENT)
Dept: OBSTETRICS AND GYNECOLOGY | Facility: CLINIC | Age: 49
End: 2019-07-23

## 2019-07-24 ENCOUNTER — PATIENT MESSAGE (OUTPATIENT)
Dept: OBSTETRICS AND GYNECOLOGY | Facility: CLINIC | Age: 49
End: 2019-07-24

## 2019-07-30 ENCOUNTER — TELEPHONE (OUTPATIENT)
Dept: OBSTETRICS AND GYNECOLOGY | Facility: OTHER | Age: 49
End: 2019-07-30

## 2019-07-30 NOTE — TELEPHONE ENCOUNTER
Spoke with patient. She does have vaginal and rectal pain that has been improving. She has stopped taking tylenol and is using the hydrocortisone cream. The vaginal and rectal swelling has gone down. She has to schedule a follow up appointment with the midwives. Her son has been brought to see the pediatrician twice. He had gained a whole pound from Monday to Thursday. She does complain of sore nipples but has lanolin. She does have the warm line number. She had no questions about breast feeding. She has intermittent help at home so it is mostly her taking care of herself and the baby. She said she was impressed by our staff being experienced and competent.

## 2019-07-31 ENCOUNTER — PATIENT MESSAGE (OUTPATIENT)
Dept: OBSTETRICS AND GYNECOLOGY | Facility: CLINIC | Age: 49
End: 2019-07-31

## 2019-08-06 ENCOUNTER — POSTPARTUM VISIT (OUTPATIENT)
Dept: OBSTETRICS AND GYNECOLOGY | Facility: CLINIC | Age: 49
End: 2019-08-06
Payer: COMMERCIAL

## 2019-08-06 VITALS
SYSTOLIC BLOOD PRESSURE: 110 MMHG | HEIGHT: 68 IN | DIASTOLIC BLOOD PRESSURE: 60 MMHG | WEIGHT: 159.5 LBS | BODY MASS INDEX: 24.17 KG/M2

## 2019-08-06 DIAGNOSIS — K64.1 GRADE II HEMORRHOIDS: ICD-10-CM

## 2019-08-06 DIAGNOSIS — Z91.89 BREASTFEEDING PROBLEM: Primary | ICD-10-CM

## 2019-08-06 PROCEDURE — 99999 PR PBB SHADOW E&M-EST. PATIENT-LVL III: ICD-10-PCS | Mod: PBBFAC,,, | Performed by: ADVANCED PRACTICE MIDWIFE

## 2019-08-06 PROCEDURE — 99999 PR PBB SHADOW E&M-EST. PATIENT-LVL III: CPT | Mod: PBBFAC,,, | Performed by: ADVANCED PRACTICE MIDWIFE

## 2019-08-06 RX ORDER — HYDROCORTISONE 25 MG/G
CREAM TOPICAL 2 TIMES DAILY
Qty: 20 G | Refills: 1 | Status: SHIPPED | OUTPATIENT
Start: 2019-08-06 | End: 2019-11-22 | Stop reason: SDUPTHER

## 2019-08-07 ENCOUNTER — PATIENT MESSAGE (OUTPATIENT)
Dept: OBSTETRICS AND GYNECOLOGY | Facility: CLINIC | Age: 49
End: 2019-08-07

## 2019-08-09 NOTE — PROGRESS NOTES
"HISTORY OF PRESENT ILLNESS:  Dipti Pacheco is a 49 y.o. female . Pt is 2 weeks  s/p  Presents today for a  2  weeks post partum exam. Delivery record reviewed and pt with uncomplicated vaginal delivery .Pt with 2nd degree vag floor laceration. Pt is breastfeeding and reports no problems. Pt with complaint of fecal incontinence. Pt reports daily BM, reports stool is formed but soft but she is unable to hold there stool when feels need to have a BM. Pt denies any watery discharge, denies excessive flatus, denies incontinence between owel mvmts.       Delivery Date:    Delivery MD:  CANDIAD Chaidez CNM  Gender:  Male  Birth Weight:  2800gm  Work plans after contraception: Stay at home- going to [facility] in 4 weeks to see family  Breast Feeding:yes  Vaginal Bleeding: None  Incontinence: No urinary incontinence but with fecal incontinence  Depression: No  [place] yet: No  Contraception discussed and patient undecided  Support system: Good.    PREGNANCY COMPLICATED BY:    Current Outpatient Medications on File Prior to Visit   Medication Sig Dispense Refill    B INFANTIS/B ANI/B JET/B BIFID (PROBIOTIC DIGESTIVE CARE ORAL) Take 1 capsule by mouth as needed.      calcium-vitamin D3 (CALCIUM 500 + D) 500 mg(1,250mg) -200 unit per tablet Take 1 tablet by mouth 2 (two) times daily with meals.      prenatal vit 55-iron-folic-om3 29-1-430 mg CPKD Take 1 tablet by mouth once daily.      cyanocobalamin, vitamin B-12, (VITAMIN B-12) 50 mcg tablet Take 50 mcg by mouth once daily.      multivitamin (ONE DAILY MULTIVITAMIN) per tablet Take 1 tablet by mouth once daily.       No current facility-administered medications on file prior to visit.        /60   Ht 5' 8" (1.727 m)   Wt 72.3 kg (159 lb 8 oz)   LMP 2018   Breastfeeding? Yes   BMI 24.25 kg/m²     PE:   APPEARANCE: Well nourished, well developed, in no acute distress.   AFFECT: WNL, alert and oriented x 3.   NECK: Neck symmetric, " without masses or thyromegaly.   NODES: No inguinal femoral lymph node enlargement.   ABDOMEN: Soft. No tenderness or masses. No hepatosplenomegaly. No hernias.   BREASTS:  Not examined due to breast feeding. Pt reports no problems  PELVIC: External female genitalia without lesions. Female hair distribution. Adequate perineal body, Normal urethral meatus. Vagina moist and well rugated without lesions or discharge.REPAIR INTACT, SUTURES INTACT, NO EVIDENCE OF INFECTION OR BREAKDOWN. NO EVIDENCE OF FISTULA No significant cystocele or rectocele present. Cervix pink without lesions, discharge or tenderness. Uterus is involuted to 4-6 week size, regular, mobile and nontender. Adnexa without masses or tenderness.   RECTUM: SPINCHTER INTACT AND PT ABLE TO TIGHTEN ON EXAM, NO EVIDENCE OF FISTULA, NO EVIDENCE OF STOOL LEAKAGE  EXTREMITIES: No edema     DIAGNOSIS:   1. Normal Post Partum Exam   2. Fecal incontinence    LABS AND TESTS ORDERED: none    MEDICATIONS PRESCRIBED: Proctofoam, Marvel Westfall's nipple cream      COUNSELING:   The patient was counseled today on: normal exam, discussed healing p vaginal delivery with repair, discussed measures to alleviate fecal incontinence. Discussed follow up in 2 weeks with Uro / Gyn if continues with incontinence.          FOLLOW-UP- PRN if no resolution of symptoms

## 2019-08-20 ENCOUNTER — OFFICE VISIT (OUTPATIENT)
Dept: OPTOMETRY | Facility: CLINIC | Age: 49
End: 2019-08-20
Payer: COMMERCIAL

## 2019-08-20 DIAGNOSIS — Z04.9 DISEASE RULED OUT AFTER EXAMINATION: ICD-10-CM

## 2019-08-20 DIAGNOSIS — H52.4 PRESBYOPIA: Primary | ICD-10-CM

## 2019-08-20 PROCEDURE — 99999 PR PBB SHADOW E&M-EST. PATIENT-LVL II: CPT | Mod: PBBFAC,,, | Performed by: OPTOMETRIST

## 2019-08-20 PROCEDURE — 92014 COMPRE OPH EXAM EST PT 1/>: CPT | Mod: S$GLB,,, | Performed by: OPTOMETRIST

## 2019-08-20 PROCEDURE — 92015 PR REFRACTION: ICD-10-PCS | Mod: S$GLB,,, | Performed by: OPTOMETRIST

## 2019-08-20 PROCEDURE — 99999 PR PBB SHADOW E&M-EST. PATIENT-LVL II: ICD-10-PCS | Mod: PBBFAC,,, | Performed by: OPTOMETRIST

## 2019-08-20 PROCEDURE — 92015 DETERMINE REFRACTIVE STATE: CPT | Mod: S$GLB,,, | Performed by: OPTOMETRIST

## 2019-08-20 PROCEDURE — 92014 PR EYE EXAM, EST PATIENT,COMPREHESV: ICD-10-PCS | Mod: S$GLB,,, | Performed by: OPTOMETRIST

## 2019-08-20 NOTE — PROGRESS NOTES
HPI     Last eye exam was 4/16/18 with Dr Paredes.    Pt here for annual   Feels like small print has become a problem  Patient denies diplopia, headaches, flashes/floaters, pain, and   itching/burning/tearing.    Pt does not use any eye drops.        Last edited by Riana Carrillo on 8/20/2019  1:26 PM. (History)            Assessment /Plan     For exam results, see Encounter Report.    Presbyopia    Disease ruled out after examination      Choroidal nevus OD    Use +1.00 OTC readers PRN  Good internal ocular health, monitor     RTC 1 year, sooner PRN

## 2019-08-21 ENCOUNTER — TELEPHONE (OUTPATIENT)
Dept: LACTATION | Facility: CLINIC | Age: 49
End: 2019-08-21

## 2019-08-21 ENCOUNTER — TELEPHONE (OUTPATIENT)
Dept: UROGYNECOLOGY | Facility: CLINIC | Age: 49
End: 2019-08-21

## 2019-08-21 ENCOUNTER — PATIENT MESSAGE (OUTPATIENT)
Dept: OBSTETRICS AND GYNECOLOGY | Facility: CLINIC | Age: 49
End: 2019-08-21

## 2019-08-21 DIAGNOSIS — R15.9 INCONTINENCE OF FECES, UNSPECIFIED FECAL INCONTINENCE TYPE: Primary | ICD-10-CM

## 2019-08-21 NOTE — TELEPHONE ENCOUNTER
----- Message from Urszula Burgos RN sent at 8/21/2019  4:04 PM CDT -----  Regarding: Appointment   Hi,    This patient delivered with the CNM's approx four weeks ago and since that time she has been having fecal incontinence.  Kristin Hsu CNM spoke with DENIZ Cortez and dr. Dameon AWAD about this pt and this issue and was advised to tell the pt to give it two weeks and if not improved we would refer to uro/gyn.  Much to the pts chagrin it has not improved.  Can you please assist us with getting this pt an appointment as soon as possible for evaluation.    Thanks,    Marilee Burgos, RNC-OB  [address] [address] [phone]/ fax [phone]

## 2019-08-21 NOTE — TELEPHONE ENCOUNTER
Pt called lactation warmline concerned about infants nursing pattern in the last week. Mom states infant nurses all day long and is not content. At night infant nurses every 2 hours but goes back to sleep. Mom pumps 1x/day in the morning after first nursing session and gets 4 oz. Mom states infant nurses for about 45-60 minutes. Infant has about 8-10 wet diapers that are heavy and pale yellow and 8-10 dirty diapers that are yellow watery, seedy, at least 3 are large. Mom not using pacifier. Infant went to pediatrician at 3 weeks of age and was 7-7 (BW 6-2). Mom states her breast began to fill before she left hospital. She states she hears swallows at beginning of feeding but as time goes on does not hear many swallows. Encouraged mom to use breast compression/stimulation throughout feeding. Plan: mom to nurse infant for 15-20 minutes per breast actively, pump and supplement with EBM if not content. Mom agreeable with plan. Next pediatrician visit is in a few weeks, encouraged to call and ask for a weight check, verbalizes understanding. Encouraged mom to call warmline if needed but LC will check in on 8/23.

## 2019-08-21 NOTE — TELEPHONE ENCOUNTER
Spoke with pt and scheduled her a consult appointment on tomorrow 8/22/19 at 3 pm, pt voiced understanding and call was ended,

## 2019-08-22 ENCOUNTER — INITIAL CONSULT (OUTPATIENT)
Dept: UROGYNECOLOGY | Facility: CLINIC | Age: 49
End: 2019-08-22
Payer: COMMERCIAL

## 2019-08-22 ENCOUNTER — PATIENT MESSAGE (OUTPATIENT)
Dept: UROGYNECOLOGY | Facility: CLINIC | Age: 49
End: 2019-08-22

## 2019-08-22 VITALS
WEIGHT: 159.38 LBS | DIASTOLIC BLOOD PRESSURE: 60 MMHG | BODY MASS INDEX: 24.15 KG/M2 | SYSTOLIC BLOOD PRESSURE: 100 MMHG | HEIGHT: 68 IN

## 2019-08-22 DIAGNOSIS — R39.15 URINARY URGENCY: Primary | ICD-10-CM

## 2019-08-22 DIAGNOSIS — R33.9 INCOMPLETE BLADDER EMPTYING: ICD-10-CM

## 2019-08-22 PROCEDURE — 87086 URINE CULTURE/COLONY COUNT: CPT

## 2019-08-22 PROCEDURE — 99215 OFFICE O/P EST HI 40 MIN: CPT | Mod: 24,S$GLB,, | Performed by: OBSTETRICS & GYNECOLOGY

## 2019-08-22 PROCEDURE — 99999 PR PBB SHADOW E&M-EST. PATIENT-LVL IV: CPT | Mod: PBBFAC,,, | Performed by: OBSTETRICS & GYNECOLOGY

## 2019-08-22 PROCEDURE — 99999 PR PBB SHADOW E&M-EST. PATIENT-LVL IV: ICD-10-PCS | Mod: PBBFAC,,, | Performed by: OBSTETRICS & GYNECOLOGY

## 2019-08-22 PROCEDURE — 99215 PR OFFICE/OUTPT VISIT, EST, LEVL V, 40-54 MIN: ICD-10-PCS | Mod: 24,S$GLB,, | Performed by: OBSTETRICS & GYNECOLOGY

## 2019-08-22 NOTE — PATIENT INSTRUCTIONS
1. Fecal incontinence  --Fiber may better control cholesterol and blood glucose.  Start daily fiber.  Take 1 tsp of fiber powder (psyllium or other sugar-free powder).  Mix in 8 oz of water.  Take x 3-5 days.  Then, increase fiber by 1 tsp every 3-5 days until stool is easy to pass.  ( Metamucil, benefiber )   --Keep a bowel diary   --Consider Lomotil in the future  --Pelvic floor PT  --Endoanal sonogram to evaluate the sphincter   --Anal manometry   --We discussed treatment option including Pelvic floor PT, SNS, and Sphincteroplasty. Risks, benefits reviewed in detail. For now we will start with pelvic floor PT.   --Consider Colorectal evaluation in the future    2.  Mixed urinary incontinence, urge > stress:   --Bladder diary for 3-7 days, write the number of times you go to the rest room and associated symptoms of urgency or leakage.   --Empty bladder every 3 hours.  Empty well: wait a minute, lean forward on toilet.    --Avoid dietary irritants (see sheet).  Keep diary x 3-5 days to determine your irritants.  --KEGELS: do 10 in AM and 10 in PM, holding each x 10 seconds.  When you feel urge to go, STOP, KEGEL, and when urge has passed, then go to bathroom.  Consider PT in future.    --URGE: Ditropan 10 mg daily or Myrbetriq 50 mg daily.  SE profile reviewed.   Takes 2-4 weeks to see if will have effect.  For dry mouth: get sour, sugar free lozenge or gum. Currently breast feeding.   --STRESS:  Pessary vs. Sling vs impressa

## 2019-08-22 NOTE — PROGRESS NOTES
Subjective:       Patient ID: Dipti Pacheco is a 49 y.o. female.    Chief Complaint:  Encopresis and rectal protruding      History of Present Illness  HPI 49 Y O F  has a past medical history of Allergy, Anxiety, Iron deficiency anemia secondary to inadequate dietary iron intake (2019), and Subclinical hypothyroidism. referred by NP Kristin Wallace for pelvic organ prolapse and fecal incompetence. She began in after She began Kegel exercise early on but stopped and has now restarted. She had a induced due to non fetal heart tracing which lead to an pulido balloon induction of labor at 37 wks IVF pregnancy. She had a  approximately 38 hours in labor no forceps no vacuum. Immediately after the delivery there was very little sensation of the pelvis. She denies previous episodes of fecal incontinence, since the delivery it ranges from daily to every others day. The amount varies from a small amount to large amounts especially more bothersome when it's more loose.        [facility] Peq Urogyn Hpi     Question 2019  9:25 PM CDT - Filed by Patient on 2019   General Urogynecology: Are you experiencing the following?    Dysuria (painful urination) No   Nocturia:  waking up at night to empty your bladder  Yes   If you answered yes to the previous question, how many times does this happen per night? 3-4   Enuresis (urine loss during sleep) No   Dribbling urine after you urinate No   Hematuria (urine appears red) No   Type of stream Weak   Urinary frequency: How often a day are you going to the bathroom per day?  10-15   Urinary Tract Infections: How many Urinary Tract Infections have you had in the past year? I have not had a UTI in the past year   If you have had a UTI in the past year, what treatments have you had so far?  I have not had a UTI in the past year   Urinary Incontinence (General): Are you experiencing the following?    Past consultation for incontinence: Have you ever seen someone for the  "evaluation of incontinence? No   If you answered yes to the previous question, please select all the therapies you have tried.  N/a- I answered no to the previous question   Please note the effectiveness of the therapies.    Need to wear protection to keep clothes dry  Yes   If you answered yes to the previous question, please basilia the protection you use.  Panty liner   If you wear protection, how much wetness is typically on each pad? Slight   If you wear protection, how often do you have to change per day, if applicable?  2   Stress Symptoms: Are you experiencing the following?    Leakage of urine with cough, laugh and/or sneeze No   If you answered yes to the previous question, what is the frequency in days, weeks and/or months? Never   Leakage of urine with sex No   Leakage of urine with bending/ lifting No   Leakage of urine with briskly walking or jogging No   If you lose urine for any other reason not previously mentioned, please note it below, if applicable.  n/a   Urge Symptoms: Are you experiencing the following?    Urgency ("got to go" feeling) Yes   Urge: How frequently do you feel an urge to urinate (feeling like you "gotta go" to the bathroom and can't wait) Several times a day   Do you experience a leakage of urine when you have a feeling of urgency?  Yes   Leakage of urine when unaware No   Past use of anticholinergics (medications used to treat overactive bladder) No   If you answered yes to the previous question, please basilia the anticholinergics you have used:     Have you ever used Mirbetriq (aka Mirabegron)?  No   Prolapse Symptoms: Are you experiencing any of the following?     Falling out/ Bulging/ Heaviness in the vagina No   Vaginal/ Abdominal Pain/ Pressure No   Need to strain/ Push to void No   Need to wait on the toilet before you void No   Unusual position to urinate (using your hands to push back the vaginal bulge) No   Sensation of incomplete emptying Yes   Past use of pessary device No "   If you answered yes to the previous question, please list the devices you have used below.  n/a   Bowel Symptoms: Are you experiencing any of the following?    Constipation No   Diarrhea  No   Hematochezia (bloody stool) No   Incomplete evacuation of stool No   Involuntary loss of formed stool No   Fecal smearing/urgency Yes   Involuntary loss of gas Yes   Vaginal Symptoms: Are you experiencing any of the following?     Abnormal vaginal bleeding  No   Vaginal dryness No   Sexually active  No   Dyspareunia (painful intercourse) No   Estrogen use  No           GYN & OB History  Patient's last menstrual period was 2018.   Date of Last Pap: No result found    OB History    Para Term  AB Living   1 1 1     1   SAB TAB Ectopic Multiple Live Births         0 1      # Outcome Date GA Lbr Anurag/2nd Weight Sex Delivery Anes PTL Lv   1 Term 19 37w3d  2.8 kg (6 lb 2.8 oz) M Vag-Spont EPI N GILLIAN      Complications: Chorioamnionitis     OB  Largest: 6#  Forceps:  Episiotomy: 2nd degree    GYN  Menarche: 15  Menstrual cycle: nomral  Menopause: amenorrhea prior to pregnancy   Hysterectomy: no  Ovaries: present       Review of Systems  Review of Systems   Constitutional: Negative for activity change, appetite change, chills, diaphoresis, fatigue, fever and unexpected weight change.   Respiratory: Negative for cough.    Gastrointestinal: Negative for abdominal distention, abdominal pain, anal bleeding, blood in stool, constipation, diarrhea, nausea, rectal pain and vomiting.   Genitourinary: Negative for decreased urine volume, difficulty urinating, dyspareunia, dysuria, enuresis, flank pain, frequency, genital sores, hematuria, menstrual problem, pelvic pain, urgency, vaginal bleeding, vaginal discharge and vaginal pain.   Musculoskeletal: Negative for back pain.   Skin: Negative for color change, pallor, rash and wound.   Allergic/Immunologic: Negative for environmental allergies, food allergies and  immunocompromised state.   Hematological: Negative for adenopathy. Does not bruise/bleed easily.   Psychiatric/Behavioral: Negative for agitation, behavioral problems, confusion and sleep disturbance.           Objective:     Physical Exam   Constitutional: She is oriented to person, place, and time. She appears well-developed.   HENT:   Head: Normocephalic and atraumatic.   Eyes: Conjunctivae and EOM are normal.   Neck: Normal range of motion. Neck supple.   Cardiovascular: Normal rate, regular rhythm, S1 normal, S2 normal, normal heart sounds and intact distal pulses.   Pulmonary/Chest: Effort normal and breath sounds normal. She exhibits no tenderness.   Abdominal: Soft. Bowel sounds are normal. She exhibits no distension and no mass. There is no hepatosplenomegaly, splenomegaly or hepatomegaly. There is no tenderness. There is no rigidity, no rebound, no guarding and no CVA tenderness. No hernia.   Genitourinary: Pelvic exam was performed with patient supine. Rectum normal, vagina normal, uterus normal, cervix normal, skenes normal and bartholins normal. Right labia normal and left labia normal. Urethra exhibits no urethral caruncle, no urethral diverticulum, no urethral mass and no hypermobility. Right bartholin is not enlarged and not tender. Left bartholin is not enlarged and not tender. Rectal exam shows resting tone normal and active tone normal. Rectal exam shows no external hemorrhoid, no fissure, no tenderness, anal tone normal and no dovetailing. Guaiac negative stool. There is a cystocele and unspecified prolapse of vaginal walls in the vagina. No foreign body, tenderness, bleeding, fistula, mesh exposure or lavator tenderness in the vagina. No vaginal discharge found. Right adnexum displays no mass and no tenderness. Left adnexum displays no mass and no tenderness. Cervix exhibits no motion tenderness and no discharge. Uterus is not tender and not experiencing uterine prolapse.   PVR: 175 ML  Empty  cough stress test: Negative.  Kegel: 1/5    POP-Q  Aa: -1 Ba: -1 C: -4   GH: 3 PB: 2 TVL: 8   Ap: -1 Bp: -1 D: -6                     Musculoskeletal: Normal range of motion.   Lymphadenopathy:     She has no axillary adenopathy.        Right: No inguinal adenopathy present.        Left: No inguinal adenopathy present.   Neurological: She is alert and oriented to person, place, and time. She has normal strength and normal reflexes. Cranial nerves II through XII intact. No cranial nerve deficit.   Skin: Skin is warm, dry and intact.   Psychiatric: She has a normal mood and affect. Her speech is normal and behavior is normal. Judgment normal. Cognition and memory are normal.             HCM  Pap's: normal   Mammo: normal 2018  Colonoscopy n/a  Dexa: n/a    Assessment:        1. Urinary urgency    2. Incomplete bladder emptying             Plan:           1. Fecal incontinence  --Fiber may better control cholesterol and blood glucose.  Start daily fiber.  Take 1 tsp of fiber powder (psyllium or other sugar-free powder).  Mix in 8 oz of water.  Take x 3-5 days.  Then, increase fiber by 1 tsp every 3-5 days until stool is easy to pass.  ( Metamucil, benefiber )   --Keep a bowel diary   --Consider Lomotil in the future  --Pelvic floor PT  --Endoanal sonogram to evaluate the sphincter   --Anal manometry   --We discussed treatment option including Pelvic floor PT, SNS, and Sphincteroplasty. Risks, benefits reviewed in detail. For now we will start with pelvic floor PT.   --Consider Colorectal evaluation in the future    2.  Mixed urinary incontinence, urge > stress:   --Bladder diary for 3-7 days, write the number of times you go to the rest room and associated symptoms of urgency or leakage.   --Empty bladder every 3 hours.  Empty well: wait a minute, lean forward on toilet.    --Avoid dietary irritants (see sheet).  Keep diary x 3-5 days to determine your irritants.  --KEGELS: do 10 in AM and 10 in PM, holding each x 10  seconds.  When you feel urge to go, STOP, KEGEL, and when urge has passed, then go to bathroom.  Consider PT in future.    --URGE: Ditropan 10 mg daily or Myrbetriq 50 mg daily.  SE profile reviewed.   Takes 2-4 weeks to see if will have effect.  For dry mouth: get sour, sugar free lozenge or gum. Currently breast feeding.   --STRESS:  Pessary vs. Sling vs impressa       Approximately 50 min were spent in consult, 90 % in discussion.     Thank you for requesting consultation of your patient.  I look forward to participating in her care.    Laury Casillas DO  Female Pelvic Medicine and Reconstructive Surgery  [facility] [place], [place]

## 2019-08-23 ENCOUNTER — HOSPITAL ENCOUNTER (OUTPATIENT)
Dept: RADIOLOGY | Facility: OTHER | Age: 49
Discharge: HOME | End: 2019-08-23
Attending: OBSTETRICS & GYNECOLOGY
Payer: COMMERCIAL

## 2019-08-23 ENCOUNTER — PATIENT MESSAGE (OUTPATIENT)
Dept: OBSTETRICS AND GYNECOLOGY | Facility: CLINIC | Age: 49
End: 2019-08-23

## 2019-08-23 DIAGNOSIS — R33.9 INCOMPLETE BLADDER EMPTYING: ICD-10-CM

## 2019-08-23 PROCEDURE — 76770 US EXAM ABDO BACK WALL COMP: CPT | Mod: TC

## 2019-08-23 PROCEDURE — 76770 US EXAM ABDO BACK WALL COMP: CPT | Mod: 26,,, | Performed by: RADIOLOGY

## 2019-08-23 PROCEDURE — 76770 US RETROPERITONEAL COMPLETE: ICD-10-PCS | Mod: 26,,, | Performed by: RADIOLOGY

## 2019-08-25 LAB — BACTERIA UR CULT: NO GROWTH

## 2019-08-26 ENCOUNTER — TELEPHONE (OUTPATIENT)
Dept: UROGYNECOLOGY | Facility: CLINIC | Age: 49
End: 2019-08-26

## 2019-08-26 ENCOUNTER — POSTPARTUM VISIT (OUTPATIENT)
Dept: OBSTETRICS AND GYNECOLOGY | Facility: CLINIC | Age: 49
End: 2019-08-26
Attending: OBSTETRICS & GYNECOLOGY
Payer: COMMERCIAL

## 2019-08-26 VITALS — BODY MASS INDEX: 24.24 KG/M2 | SYSTOLIC BLOOD PRESSURE: 100 MMHG | DIASTOLIC BLOOD PRESSURE: 60 MMHG | HEIGHT: 68 IN

## 2019-08-26 DIAGNOSIS — R15.9 INCONTINENCE OF FECES, UNSPECIFIED FECAL INCONTINENCE TYPE: Primary | ICD-10-CM

## 2019-08-26 PROCEDURE — 99213 PR OFFICE/OUTPT VISIT, EST, LEVL III, 20-29 MIN: ICD-10-PCS | Mod: 24,S$GLB,, | Performed by: OBSTETRICS & GYNECOLOGY

## 2019-08-26 PROCEDURE — 99213 OFFICE O/P EST LOW 20 MIN: CPT | Mod: 24,S$GLB,, | Performed by: OBSTETRICS & GYNECOLOGY

## 2019-08-26 PROCEDURE — 99999 PR PBB SHADOW E&M-EST. PATIENT-LVL III: ICD-10-PCS | Mod: PBBFAC,,, | Performed by: OBSTETRICS & GYNECOLOGY

## 2019-08-26 PROCEDURE — 3008F BODY MASS INDEX DOCD: CPT | Mod: CPTII,S$GLB,, | Performed by: OBSTETRICS & GYNECOLOGY

## 2019-08-26 PROCEDURE — 99999 PR PBB SHADOW E&M-EST. PATIENT-LVL III: CPT | Mod: PBBFAC,,, | Performed by: OBSTETRICS & GYNECOLOGY

## 2019-08-26 PROCEDURE — 3008F PR BODY MASS INDEX (BMI) DOCUMENTED: ICD-10-PCS | Mod: CPTII,S$GLB,, | Performed by: OBSTETRICS & GYNECOLOGY

## 2019-08-27 NOTE — PROGRESS NOTES
SUBJECTIVE:   49 y.o. female   for fecal incontinence. Patient's last menstrual period was 2018..  Patient had  7 with second degree tear.  Reports some fecal incontinence since this time.  It ranges from daily to every others day. The amount varies from a small amount to large amounts especially more bothersome when it's more loose.  Saw Dr. Casillas for evaluation.  Normal sphincter tone was noted.  Patient was instructed on fiber intake and referred to pelvic floor PT.  Will consider further evaluation with anal sphincter u/s if needed.  Patient is highly concerned that she learned from Dr. Casillas that she had thin labia minora that could eventually fuse together.  She was given Estrogen cream to prevent this problem.  She is breastfeeding. Also has some urination issues.         Past Medical History:   Diagnosis Date    Allergy     Anxiety     Iron deficiency anemia secondary to inadequate dietary iron intake 2019    Subclinical hypothyroidism      Past Surgical History:   Procedure Laterality Date    dental implants      Egg retrieval       Social History     Socioeconomic History    Marital status: Single     Spouse name: Not on file    Number of children: Not on file    Years of education: Not on file    Highest education level: Not on file   Occupational History     Employer: [employer]   Social Needs    Financial resource strain: Not on file    Food insecurity:     Worry: Not on file     Inability: Not on file    Transportation needs:     Medical: Not on file     Non-medical: Not on file   Tobacco Use    Smoking status: Never Smoker    Smokeless tobacco: Never Used   Substance and Sexual Activity    Alcohol use: Yes     Comment: socially    Drug use: No    Sexual activity: Not Currently     Partners: Male   Lifestyle    Physical activity:     Days per week: Not on file     Minutes per session: Not on file    Stress: Not on file   Relationships     Social connections:     Talks on phone: Not on file     Gets together: Not on file     Attends [employer] service: Not on file     Active member of club or organization: Not on file     Attends meetings of clubs or organizations: Not on file     Relationship status: Not on file   Other Topics Concern    Are you pregnant or think you may be? No    Breast-feeding No   Social History Narrative    Not on file     Family History   Problem Relation Age of Onset    Alcohol abuse Brother     Depression Brother     Diabetes Mellitus Paternal Grandmother     Alcohol abuse Paternal Grandfather     Breast cancer Mother 71    Cancer Mother         Breast    Melanoma Neg Hx      OB History    Para Term  AB Living   1 1 1     1   SAB TAB Ectopic Multiple Live Births         0 1      # Outcome Date GA Lbr Anurag/2nd Weight Sex Delivery Anes PTL Lv   1 Term 19 37w3d  2.8 kg (6 lb 2.8 oz) M Vag-Spont EPI N GILLIAN      Complications: Chorioamnionitis         Current Outpatient Medications   Medication Sig Dispense Refill    B INFANTIS/B ANI/B JET/B BIFID (PROBIOTIC DIGESTIVE CARE ORAL) Take 1 capsule by mouth as needed.      calcium-vitamin D3 (CALCIUM 500 + D) 500 mg(1,250mg) -200 unit per tablet Take 1 tablet by mouth 2 (two) times daily with meals.      conjugated estrogens (PREMARIN) vaginal cream 1 g with applicator or dime-sized amt with finger in vagina nightly x 2 weeks, then twice a week thereafter 45 g 4    cyanocobalamin, vitamin B-12, (VITAMIN B-12) 50 mcg tablet Take 50 mcg by mouth once daily.      hydrocortisone 2.5 % cream Apply topically 2 (two) times daily. 20 g 1    jack salguero ointment Apply topically 3 (three) times daily. Apply after feeding. Do not wash off. This compounded medication expires in 30 days. 30 g 1    multivitamin (ONE DAILY MULTIVITAMIN) per tablet Take 1 tablet by mouth once daily.      prenatal vit 55-iron-folic-om3 29-1-430 mg CPKD Take 1 tablet by mouth once  "daily.       No current facility-administered medications for this visit.      Allergies: Aspirin     ROS:  Constitutional: no weight loss, weight gain, fever, fatigue  Eyes:  No vision changes, glasses/contacts  ENT/Mouth: No ulcers, sinus problems, ears ringing, headache  Cardiovascular: No inability to lie flat, chest pain, exercise intolerance, swelling, heart palpitations  Respiratory: No wheezing, coughing blood, shortness of breath, or cough  Gastrointestinal: No diarrhea, bloody stool, nausea/vomiting, constipation, gas, hemorrhoids  Genitourinary: No blood in urine, painful urination, urgency of urination, frequency of urination, +incomplete emptying, +incontinence, abnormal bleeding, painful periods, heavy periods, vaginal discharge, vaginal odor, painful intercourse, sexual problems, bleeding after intercourse.  Musculoskeletal: No muscle weakness  Skin/Breast: No painful breasts, nipple discharge, masses, rash, ulcers  Neurological: No passing out, seizures, numbness, headache  Endocrine: No diabetes, hypothyroid, hyperthyroid, hot flashes, hair loss, abnormal hair growth, ance  Psychiatric: No depression, crying  Hematologic: No bruises, bleeding, swollen lymph nodes, anemia.      OBJECTIVE:   The patient appears well, alert, oriented x 3, in no distress.  /60   Ht 5' 8" (1.727 m)   LMP 11/04/2018   Breastfeeding? Yes   BMI 24.24 kg/m²   ABDOMEN: no hernias, masses, or hepatosplenomegaly  GENITALIA: normal external genitalia, no erythema, no discharge  URETHRA: normal urethra, normal urethral meatus  VAGINA: Normal  CERVIX: no lesions or cervical motion tenderness  UTERUS: normal size, contour, position, consistency, mobility, non-tender  ADNEXA: no mass, fullness, tenderness      ASSESSMENT:   1. Incontinence of feces, unspecified fecal incontinence type         PLAN:       Discussed fiber intake.  Keep diary.  Keep PT evaluation.  Will then consider is u/s of sphincter indicated.  Reassured " that labia minora are somewhat resorbed, but not fused, and this can be part of aging process.  Discussed estrogen cream while breastfeeding.  Return to clinic prn

## 2019-08-28 NOTE — PROGRESS NOTES
Consult Start Time:1330    Reason for consultation: latch asst and pre/post wt eval      Delivery history  Hospital of birth: 2019  Delivery type:   Pregnancy complications:  No  Labor/birth complications:  Chorio, baby born at 37&3  Maternal history: IVF in [place], AMA, HSV, Hypothyroid, Anxiety    Breastfeeding history: hospital stay  Baby seemed to nurse ok in the hospital and mother was starting to fill before DC.      Breastfeeding history: home  Mother states infant is nursing all day and is not content. Baby nurses every 2 hours at night.Feedings can take up to an hour. Mother is pumping after feedings and getting 4 oz. Mother hears swallows at the beginning of feeding but not toward the end of feedings. LC put mother on the plan to nurse, pump and supplement till content      Feeding assessment :  Birth weight:6-2oz  Last MD appt:baby was 7-7oz at 3 weeks    Naked weight: 10-03.8  Pre-feeding weight: 4670  Post- feeding weight: 4760     L side: 20 minutes   R side: 20 minutes    Total feed time: 40 minutes  Transferred 3 oz    Supplementation provided: Baby rooting, mother offered formula but baby did not want to take it. Spit up through his nose and mouth. LC not sure if he was still hungry or just wanted to suck. Baby seems to be very oral. Nursed well on mother but still acting hungry after taking 3 oz.    Lactation observations:    Mother:Mother latched baby and baby at first wanted to nurse shallow. LC got mother to latch baby on a bit deeper. Baby nursed both breat and nursed in football. Baby is no longer swallowing but still rooting. Offered a supplement of EBM but baby Spit up through his nose and mouth. LC not sure if he was still hungry or just wanted to suck for comfort. Sucked on bottle but once baby got a few sucks of EBM he would pull off crying.  Baby seems to be very oral. Nursed well on mother but still acting hungry after taking 3 oz. Refused bottle of EBM.  "Mother's supply is good. She is able to pump out about 4 oz a breast. Using a Fremie. breastpump    Baby- Nursed both sides well but still rooting after but does not want bottle. Mother states sometimes he is better if he is held upright. Occasional throws up. Never likes to take a bottle. Very frettful with bottle. Mother worried that he might not take a bottle at . Baby does not like pacifier either.     Nurse Practitioner Fabiola Clemens observed feeding and reviewed breastfeeding plan    Recommended Interventions and Plan of Care for Dipti Pacheco    x Breastfeed baby  on cue until content at least  8 or more times in 24hrs.     x Use the asymmetric latch as demonstrated during the consult. If mom would like to watch a baby latch while practicing go to    www.[employer] or www.[employer].TUBE and watch video: Attaching Your Baby at the Breast"  (English)    x  Use breast compression during pauses in sucking as demonstrated during the consult. (compress 1,2,3 release)               x Observe for signs of milk transfer to baby:   wide pauses in the sucks   swallows throughout  the feedings   milk on the babys lips when removed from the breast   wet nipple as it comes out of the babys mouth   heavy breasts before a feeding and softer breasts after the feeding    x Try to latch the baby onto the breast until latch occurs or until 10-15 min. elapse.  If unable to latch the baby deeply onto the breasts, supplement the baby and pump the breasts until empty and store the milk for the next feeding.    x Use breastpump for  15 minutes after nursing to increase milk supply, may feed baby any milk obtained if baby still hungry.     x Count and record the number of feedings, urine diapers, and dirty diapers every    24hrs.    x Clean all breastfeeding aids with warm soapy water after each use and sterilize each day.    X Ask baby's MD about a referral to a Speech Language " Pathologist: [facility] [phone]. Fax- [phone], [facility]      [phone], [facility] [phone]. Will need an  order from pediatrician for SLP to Eval and Treat. Make sure that you let the SLP's know that this is a breast fed baby. This may help baby learn how to take a bottle and make recommendations.        x Call LC if you feel you need a follow up visit for more practice with breastfeeding or more questions.  Call [phone] for an appt.    x See ped for follow up wt check in 2 weeks    X Refer to page 28 of The Mother's Breastfeeding Guide for Community Resources for breastfeeding mothers.    Above plan discussed with mother. LC will call mother follow up on above plan and revise plan if needed. Mother has  phone number to call with questions and a copy of above plan was provided.    Carmen Everett RNC, IBCLC [phone]    Consult end time:1500  Total Consult Time:90 minutes

## 2019-08-29 ENCOUNTER — LACTATION CONSULT (OUTPATIENT)
Dept: LACTATION | Facility: CLINIC | Age: 49
End: 2019-08-29
Payer: COMMERCIAL

## 2019-08-29 PROCEDURE — 96150 PR HEAL & BEHAV ASSESS,EA 15 MIN,INIT: CPT | Mod: S$GLB,,, | Performed by: NURSE PRACTITIONER

## 2019-08-29 PROCEDURE — 96150 PR HEAL & BEHAV ASSESS,EA 15 MIN,INIT: ICD-10-PCS | Mod: S$GLB,,, | Performed by: NURSE PRACTITIONER

## 2019-08-29 NOTE — PROGRESS NOTES
I have discussed patient, reviewed lactation's notes and assessments performed by Carmen Everett, RNC, IBCLC/lactation. Breastfeeding observed. Questions answered. I agree with documentation.

## 2019-09-03 ENCOUNTER — POSTPARTUM VISIT (OUTPATIENT)
Dept: OBSTETRICS AND GYNECOLOGY | Facility: CLINIC | Age: 49
End: 2019-09-03
Payer: COMMERCIAL

## 2019-09-03 VITALS
BODY MASS INDEX: 24.1 KG/M2 | DIASTOLIC BLOOD PRESSURE: 68 MMHG | SYSTOLIC BLOOD PRESSURE: 106 MMHG | HEIGHT: 68 IN | WEIGHT: 159 LBS

## 2019-09-03 PROCEDURE — 99999 PR PBB SHADOW E&M-EST. PATIENT-LVL III: CPT | Mod: PBBFAC,,, | Performed by: ADVANCED PRACTICE MIDWIFE

## 2019-09-03 PROCEDURE — 0503F POSTPARTUM CARE VISIT: CPT | Mod: S$GLB,,, | Performed by: ADVANCED PRACTICE MIDWIFE

## 2019-09-03 PROCEDURE — 0503F PR POSTPARTUM CARE VISIT: ICD-10-PCS | Mod: S$GLB,,, | Performed by: ADVANCED PRACTICE MIDWIFE

## 2019-09-03 PROCEDURE — 99999 PR PBB SHADOW E&M-EST. PATIENT-LVL III: ICD-10-PCS | Mod: PBBFAC,,, | Performed by: ADVANCED PRACTICE MIDWIFE

## 2019-09-04 ENCOUNTER — CLINICAL SUPPORT (OUTPATIENT)
Dept: REHABILITATION | Facility: HOSPITAL | Age: 49
End: 2019-09-04
Payer: COMMERCIAL

## 2019-09-04 DIAGNOSIS — M62.9 DISORDER OF MUSCLE: ICD-10-CM

## 2019-09-04 PROBLEM — R29.3 POSTURE IMBALANCE: Status: RESOLVED | Noted: 2019-02-08 | Resolved: 2019-09-04

## 2019-09-04 PROCEDURE — 97110 THERAPEUTIC EXERCISES: CPT | Mod: PO

## 2019-09-04 PROCEDURE — 97162 PT EVAL MOD COMPLEX 30 MIN: CPT | Mod: PO

## 2019-09-04 NOTE — PATIENT INSTRUCTIONS
Home Program: 2019    Kegels in lyin. Lie down comfortably with legs and buttocks relaxed, knees bent.  OR WHILE SITTING FEEDING THE BABY  2. Squeeze and LIFT the muscles that stop the flow of urine and passage of gas.  Keep legs and buttock muscles quiet.  3. Hold lift for 5 seconds without holding breath.  4. Release and DROP for 10 seconds.  5. Repeat 10 times, 1 sets, at every feeding.      No Kegels while urinating!      TA sets: work on drawing in your lower belly as if zipping tight pants.  This can be done in plank, in sitting, lying down, standing.  Start by breathing in and letting your belly go; then breathe out SLOWLY and draw in your TA.      Good to do in addition:  Bridging, glut sets, mini squats with core activation.

## 2019-09-04 NOTE — PLAN OF CARE
OUTPATIENT PHYSICAL THERAPY EVALUATION        Patient: Dipti Parikh Hanovermarco antonio [facility] #:  [id number]    Date of treatment: 09/04/2019   Time in: 2:00  Time out: 3:08  Billable time: 60 minutes  # Visits: 1/1  Auth: today only  POC expiration: 12/4/2019      HISTORY      Dipti is a 49 y.o. female evaluated on 09/04/2019    Physician:  Kristin Hsu CNM   Diagnosis:   Encounter Diagnosis   Name Primary?    Disorder of muscle       Treatment ordered: Physical Therapy  Medical History:   Past Medical History:   Diagnosis Date    Allergy     Anxiety     Disorder of muscle 9/4/2019    Iron deficiency anemia secondary to inadequate dietary iron intake 7/19/2019    Subclinical hypothyroidism       Surgical History:   Past Surgical History:   Procedure Laterality Date    dental implants      Egg retrieval        Medications:   Current Outpatient Medications   Medication Sig    B INFANTIS/B ANI/B JET/B BIFID (PROBIOTIC DIGESTIVE CARE ORAL) Take 1 capsule by mouth as needed.    calcium-vitamin D3 (CALCIUM 500 + D) 500 mg(1,250mg) -200 unit per tablet Take 1 tablet by mouth 2 (two) times daily with meals.    conjugated estrogens (PREMARIN) vaginal cream 1 g with applicator or dime-sized amt with finger in vagina nightly x 2 weeks, then twice a week thereafter    cyanocobalamin, vitamin B-12, (VITAMIN B-12) 50 mcg tablet Take 50 mcg by mouth once daily.    hydrocortisone 2.5 % cream Apply topically 2 (two) times daily.    jack salguero ointment Apply topically 3 (three) times daily. Apply after feeding. Do not wash off. This compounded medication expires in 30 days.    multivitamin (ONE DAILY MULTIVITAMIN) per tablet Take 1 tablet by mouth once daily.    prenatal vit 55-iron-folic-om3 29-1-430 mg CPKD Take 1 tablet by mouth once daily.     No current facility-administered medications for this visit.        Allergies:   Review of patient's allergies indicates:   Allergen Reactions    Aspirin Nausea Only      Powdered form only         Precautions: universal    OB/GYN History:  vaginal delivery last month, with perineal laceration; fecal incontinence post partum    Bladder/Bowel History: urinary frequency and nocturia; constipation when she travels      SUBJECTIVE     Date of onset: July 18th, 2019  History of current complaint: Dipti reports that she had a 2nd degree tear after the birth of her son.  Pushed for about 45 minutes.  Had issues with hemorrhoids from pushing but they are now less prominent.  She was having stool loss but this is improving with fiber supplement.  She reports that she had very little perineal sensation after delivery but this is improving in that she can feel some anal closure.  She has a sensation of anal pressure when she stands for long periods.  She notes some urge leakage when she puts off too long.    Patient's goals for therapy: to be able to control her bowels  Pain: Patient reports 0/10, with 0 being the lowest and 10 being the highest.    Activities that cause symptoms: prolonged standing and defecating    Previous treatment included medication    Sexually active? No    Frequency of urination:   Daytime: every 2 hours           Nighttime: gets up with the baby    Difficulty initiating urine stream: No  Urine stream: strong  Complete emptying: Yes- but told she has a high PVR in Dr. Neil's office  Bladder leakage: Yes  Frequency of incidents: most days- mostly after an outing  Amount leaked (urine): drops    Frequency of bowel movements: once a day  Difficulty initiating BM: No  Quality/Shape of BM: [place] Stool Chart 4-5; firmer 4 with Benefiber  Colon leakage: Yes  Frequency of incidents: it's been a few days; was happening daily   Amount leaked (bowels): small amount  Form of protection: pad  Number of pads required in 24 hours: 3-4      Types of fluid intake: water and decaf coffee with soy milk, watered down juice, stirs in Bouillon  Diet:Ovolacto vegetarian  Current  exercise:progressing herself with walking in [place]; back doing Yoga, squats    Occupation: Pt will be returning to work in about 5 weeks; mainly seated at a desk with some walking.    OBJECTIVE     ORTHO SCREEN  Posture: WNL  Pelvic alignment: no sign of deviations noted in supine    ABDOMINALS  Scarring: none  Diastasis: 1 fingers above umbilicus, 1 fingers at umbilicus, 0 fingers below umbilicus   Abdominal strength: Rectus: 2/5     TA: well isolated, palpable contraction B'ly  Chaperone: declined  Consent signed: Yes    VAGINAL PELVIC FLOOR EXAM    EXTERNAL ASSESSMENT  Introitus: WNL  Skin condition: WNL  Scarring: perineal scar healed   Sensation: WNL   Pain:  none  Voluntary contraction: visible lift  Voluntary relaxation: visible drop  Involuntary contraction: prolapse  Bearing down: prolapse  Perineal descent: present      INTERNAL ASSESSMENT  Pain: none   Sensation: unable to localize pressure appropriately  Vaginal vault: roomy   Muscle Bulk: atrophy   Muscle Power: 2-/5      Quality of contraction: slow rise and decreased hold   Specificity: WNL   Coordination: WNL   Prolapse check:cystocele and rectocele noted    RECTAL PELVIC FLOOR EXAM- deferred    SEMG EVALUATION: Deferred due to time constraints    TREATMENT      Therapeutic Exercise to develop  strength, endurance and core stabilization for 10 minutes including: supine Kegels, TA setting.  We also reviewed some exercises in her post partum and empowered book- too many to list.  I instructed her to monitor her breathing, TA, and pelvic floor and if she feels any of these areas losing form, she is to stop and switch to a less strenuous activity.  I cautioned her against lunges (for now) and abdominal crunches due to pelvic floor weakness.      Education: instructed on general anatomy/physiology of urinary/bowel system; discussed plan of care with patient; instructed in benefits/risks of treatment; instructed in alternative methods of treatment;  instructed in risks of refusing treatment; patient agreed to treatment plan.     Also educated in: anatomy/physiology of pelvic floor and posture/body mechanices    ASSESSMENT      This is a 49 y.o. female referred to outpatient physical therapy and presents with a medical diagnosis of vaginal delivery. Patient will benefit from skilled physical therapy to improve her pelvic muscular support to allow decreased discomfort with standing and movement, and improved sphincter control during ADL's and work tasks.    Educational/Spiritual/Cultural needs: none  Abuse/Neglect: no signs  Nutritional Status: WDWN   Fall Risk: none    Pt's spiritual, cultural and educational needs considered and pt agreeable to plan of care and goals as stated below:     Medical necessity is demonstrated by the following IMPAIRMENTS/PROBLEMS     History  Co-morbidities and personal factors that may impact the plan of care Examination  Body Structures and Functions, activity limitations and participation restrictions that may impact the plan of care Clinical Presentation   Decision Making/ Complexity Score   Co-morbidities:   6 weeks post partum              Personal Factors:    Body Regions/Systems/Functions:    poor knowledge of body mechanics and posture, perineal descent, decreased pelvic muscle strength, decreased endurance of the pelvic muscles, poor quality of pelvic muscle contraction, increased frequency of urination and poor coordination of pelvic floor muscles during ADL's leading to urinary or fecal leakage           Activity limitations:   Barriers to Learning: none  Environmental Barriers: none noted    Participation Restrictions:   Pt cannot perform ADL's or  activities without wearing a pad to catch leakage       evolving moderate           PLAN    Frequency: once per 2 weeks  Duration: 12 weeks    Long Term Goals: 12 weeks   Pt will report improved ability to perform ADLs (ie. dressing, bathing, functional transfers)  with little (drops) to no urinary leakage 7/7 days per week.  Pt will report improved ability to perform iADLs (ie. driving, home chores, grocery shopping) with little (drops) to no urinary leakage 7/7 days per week.   Pt will report improved ability to manage bladder spasms appropriately 100% of the time for an improvement in urinary frequency/urgency.   Pt will report a decrease in pad use to 2 pads per day.  Pt will be independent with double voiding techniques 100% of the time to ensure full bladder emptying and decrease pt's risk of infection.   Pt will report improved ability to perform iADLs (ie. driving, home chores, grocery shopping) with little (drops) to no fecal leakage 7/7 days per week.   Pt/family will be independent with HEP for continued self-management of symptoms.    Physical therapy will include: therapeutic exercises, therapeutic activity, neuromuscular re-education, manual therapy, modalities PRN and patient/family education      Therapist: Eula Guallpa, PT, BCB-PMD  9/4/2019

## 2019-09-05 ENCOUNTER — TELEPHONE (OUTPATIENT)
Dept: LACTATION | Facility: CLINIC | Age: 49
End: 2019-09-05

## 2019-09-05 NOTE — TELEPHONE ENCOUNTER
LC called to check on mother that had a lactation OPC. Mother feels more griffith and feels that she is getting more milk. Baby with many wet/dirty diapers. Mother is nursing only and not giving any supplements. May go to the SLP to learn how to transition to bottle. Baby does not take a bottle well and mother is worried because baby will be in  and will need to take bottles. Mother will call LC as needed, Baby has a 8 week appt coming up with ped.

## 2019-09-18 ENCOUNTER — CLINICAL SUPPORT (OUTPATIENT)
Dept: REHABILITATION | Facility: HOSPITAL | Age: 49
End: 2019-09-18
Attending: ADVANCED PRACTICE MIDWIFE
Payer: COMMERCIAL

## 2019-09-18 DIAGNOSIS — M62.9 DISORDER OF MUSCLE: ICD-10-CM

## 2019-09-18 PROCEDURE — 97112 NEUROMUSCULAR REEDUCATION: CPT | Mod: PO

## 2019-09-18 NOTE — PROGRESS NOTES
OUTPATIENT PHYSICAL THERAPY DAILY NOTE       Patient: Dipti Patel [facility] #:  [id number]    Diagnosis:   Encounter Diagnosis   Name Primary?    Disorder of muscle       Date of treatment: 09/18/2019     Time in: 1:25  Time out: 2:00  Billable time: 30 minutes  Visit #: 2  Auth: 20 exp 12/31  POC expiration: 9/4/2019    SUBJECTIVE   Pt reports: 2 episodes of FI yesterday- streaking and a larger amount after that.    Pain: 0/10 on VAS scale  Pain location: NA    OBJECTIVE     Neuromuscular Re-education to develop coordination for 30 minutes including:  Kegels with assist of SEMG.  We worked in supine with an internal vaginal sensor.  She was instructed in proper technique for insertion, and in cleaning of sensor.  She demonstrated normal baseline resting, fair/low WR rise, but very good holding ability in 10 sec Kegels.  She was instructed to perform the Kegels in supine with sensor for help, but that she could try them in standing if she is not holding the baby, and she could keep the sensor in.  We reviewed benefit of doing GS, bridges, and mini squats, and basically anything that engages gluts.  Pt verbalized understanding of all instruction per handout issued.      Education: Discussed progression of plan of care with patient; educated pt in activity modification; reviewed HEP with pt. Pt demonstrated and verbalized understanding of all instruction and was provided with a handout of HEP (see Patient Instructions).      ASSESSMENT      Pt tolerated entire treatment well with no visible adverse effects. Will reassess strength next session and continue to work from there- may benefit from stim.    Will the patient continue to benefit from skilled PT intervention? Yes  Medical necessity demonstrated by: Skilled PT supervision required for HEP and treatment progression  Progress towards goals:  satisfactory  New/Revised goals: none      PLAN     Continue with established Plan of Care, working toward  established PT goals.

## 2019-09-18 NOTE — PATIENT INSTRUCTIONS
Home Program: 9/18/2019    Kegels in lying with sensor:    1. Lie down comfortably with legs and buttocks relaxed, knees bent.  Insert sensor with long tab facing forward.  (Labia hugs the sensor)  2. Squeeze and LIFT the muscles that stop the flow of urine and passage of gas.  Keep legs and buttock muscles quiet.  3. Hold lift for 10 seconds without holding breath.  4. Release and DROP for 10 seconds.  5. Repeat 10 times, 1 sets, at every feeding.      YOU CAN TRY THE ABOVE IN STANDING IF YOU CAN KEEP THE SENSOR IN.      No Kegels while urinating!      TA sets: work on drawing in your lower belly as if zipping tight pants.  This can be done in plank, in sitting, lying down, standing.  Start by breathing in and letting your belly go; then breathe out SLOWLY and draw in your TA.      Good to do in addition:  Bridging, glut sets, mini squats with core activation.

## 2019-09-19 ENCOUNTER — PATIENT MESSAGE (OUTPATIENT)
Dept: UROGYNECOLOGY | Facility: CLINIC | Age: 49
End: 2019-09-19

## 2019-09-19 ENCOUNTER — PATIENT MESSAGE (OUTPATIENT)
Dept: REHABILITATION | Facility: HOSPITAL | Age: 49
End: 2019-09-19

## 2019-09-23 ENCOUNTER — OFFICE VISIT (OUTPATIENT)
Dept: UROGYNECOLOGY | Facility: CLINIC | Age: 49
End: 2019-09-23
Payer: COMMERCIAL

## 2019-09-23 VITALS — WEIGHT: 153.88 LBS | HEIGHT: 68 IN | BODY MASS INDEX: 23.32 KG/M2

## 2019-09-23 DIAGNOSIS — N81.11 MIDLINE CYSTOCELE: Primary | ICD-10-CM

## 2019-09-23 DIAGNOSIS — N39.46 MIXED STRESS AND URGE URINARY INCONTINENCE: ICD-10-CM

## 2019-09-23 DIAGNOSIS — R15.1 FECAL SMEARING: ICD-10-CM

## 2019-09-23 DIAGNOSIS — R10.2 PELVIC PRESSURE IN FEMALE: ICD-10-CM

## 2019-09-23 PROCEDURE — 3008F BODY MASS INDEX DOCD: CPT | Mod: CPTII,S$GLB,, | Performed by: NURSE PRACTITIONER

## 2019-09-23 PROCEDURE — 99999 PR PBB SHADOW E&M-EST. PATIENT-LVL III: CPT | Mod: PBBFAC,,, | Performed by: NURSE PRACTITIONER

## 2019-09-23 PROCEDURE — 57160 PR FIT/INSERT INTRAVAG SUPPORT DEVICE: ICD-10-PCS | Mod: 58,S$GLB,, | Performed by: NURSE PRACTITIONER

## 2019-09-23 PROCEDURE — 99213 PR OFFICE/OUTPT VISIT, EST, LEVL III, 20-29 MIN: ICD-10-PCS | Mod: 24,25,S$GLB, | Performed by: NURSE PRACTITIONER

## 2019-09-23 PROCEDURE — 99213 OFFICE O/P EST LOW 20 MIN: CPT | Mod: 24,25,S$GLB, | Performed by: NURSE PRACTITIONER

## 2019-09-23 PROCEDURE — 99999 PR PBB SHADOW E&M-EST. PATIENT-LVL III: ICD-10-PCS | Mod: PBBFAC,,, | Performed by: NURSE PRACTITIONER

## 2019-09-23 PROCEDURE — 57160 INSERT PESSARY/OTHER DEVICE: CPT | Mod: 58,S$GLB,, | Performed by: NURSE PRACTITIONER

## 2019-09-23 PROCEDURE — 3008F PR BODY MASS INDEX (BMI) DOCUMENTED: ICD-10-PCS | Mod: CPTII,S$GLB,, | Performed by: NURSE PRACTITIONER

## 2019-09-23 NOTE — PATIENT INSTRUCTIONS
1. Fecal incontinence  -- Start daily fiber.  Take 1 tsp of fiber powder (psyllium or other sugar-free powder).  Mix in 8 oz of water.  Take x 3-5 days.  Then, increase fiber by 1 tsp every 3-5 days until stool is easy to pass.  ( Metamucil, benefiber )   --Keep a bowel diary   --Consider Lomotil in the future  --Pelvic floor PT  --Endoanal sonogram to evaluate the sphincter   --Anal manometry   --We discussed treatment option including Pelvic floor PT, SNS, and Sphincteroplasty. Risks, benefits reviewed in detail. For now we will start with pelvic floor PT.   --Consider Colorectal evaluation in the future     2.  Mixed urinary incontinence, urge > stress:   --Bladder diary for 3-7 days, write the number of times you go to the rest room and associated symptoms of urgency or leakage.   --Empty bladder every 3 hours.  Empty well: wait a minute, lean forward on toilet.    --Avoid dietary irritants (see sheet).  Keep diary x 3-5 days to determine your irritants.  --KEGELS: do 10 in AM and 10 in PM, holding each x 10 seconds.  When you feel urge to go, STOP, KEGEL, and when urge has passed, then go to bathroom.  Consider PT in future.    --URGE: Ditropan 10 mg daily or Myrbetriq 50 mg daily.  SE profile reviewed.   Takes 2-4 weeks to see if will have effect.  For dry mouth: get sour, sugar free lozenge or gum. Currently breast feeding.   --STRESS:  Pessary vs. Sling vs impressa       3. Midline cystocele  --trial #5 ring with support pessary-- will order replacement from [employer]  --PESSARY CARE: Remove pessary as frequently as nightly and as infrequently as every 2-3 weeks.  Remove before intercourse.  May need to remove before bowel movements if straining dislodges.   Wash with soap and water (no ).  Replace using small amount of water-based lubricant (like KY jelly) at end being introduced into vagina.    4. RTC 4 weeks for pc

## 2019-09-23 NOTE — PROGRESS NOTES
Urogyn follow up  [unfilled]  .  [facility], [place] [facility] 4   [address] [id number]    Dipti Pacheco  [id number]  1970      Dipti Pacheco is a 49 y.o. here for a urogyn follow up for pessary fitting.    Last HPI from 2019  HPI 49 Y O F  has a past medical history of Allergy, Anxiety, Iron deficiency anemia secondary to inadequate dietary iron intake (2019), and Subclinical hypothyroidism. referred by NP Kristin Wallace for pelvic organ prolapse and fecal incompetence. She began in after She began Kegel exercise early on but stopped and has now restarted. She had a induced due to non fetal heart tracing which lead to an pulido balloon induction of labor at 37 wks IVF pregnancy. She had a  approximately 38 hours in labor no forceps no vacuum. Immediately after the delivery there was very little sensation of the pelvis. She denies previous episodes of fecal incontinence, since the delivery it ranges from daily to every others day. The amount varies from a small amount to large amounts especially more bothersome when it's more loose.          [facility]      Question 2019  9:25 PM CDT - Filed by Patient on 2019   General Urogynecology: Are you experiencing the following?     Dysuria (painful urination) No   Nocturia:  waking up at night to empty your bladder  Yes   If you answered yes to the previous question, how many times does this happen per night? 3-4   Enuresis (urine loss during sleep) No   Dribbling urine after you urinate No   Hematuria (urine appears red) No   Type of stream Weak   Urinary frequency: How often a day are you going to the bathroom per day?  10-15   Urinary Tract Infections: How many Urinary Tract Infections have you had in the past year? I have not had a UTI in the past year   If you have had a UTI in the past year, what treatments have you had so far?  I have not had a UTI in the past year   Urinary  "Incontinence (General): Are you experiencing the following?     Past consultation for incontinence: Have you ever seen someone for the evaluation of incontinence? No   If you answered yes to the previous question, please select all the therapies you have tried.  N/a- I answered no to the previous question   Please note the effectiveness of the therapies.     Need to wear protection to keep clothes dry  Yes   If you answered yes to the previous question, please basilia the protection you use.  Panty liner   If you wear protection, how much wetness is typically on each pad? Slight   If you wear protection, how often do you have to change per day, if applicable?  2   Stress Symptoms: Are you experiencing the following?     Leakage of urine with cough, laugh and/or sneeze No   If you answered yes to the previous question, what is the frequency in days, weeks and/or months? Never   Leakage of urine with sex No   Leakage of urine with bending/ lifting No   Leakage of urine with briskly walking or jogging No   If you lose urine for any other reason not previously mentioned, please note it below, if applicable.  n/a   Urge Symptoms: Are you experiencing the following?     Urgency ("got to go" feeling) Yes   Urge: How frequently do you feel an urge to urinate (feeling like you "gotta go" to the bathroom and can't wait) Several times a day   Do you experience a leakage of urine when you have a feeling of urgency?  Yes   Leakage of urine when unaware No   Past use of anticholinergics (medications used to treat overactive bladder) No   If you answered yes to the previous question, please basilia the anticholinergics you have used:      Have you ever used Mirbetriq (aka Mirabegron)?  No   Prolapse Symptoms: Are you experiencing any of the following?      Falling out/ Bulging/ Heaviness in the vagina No   Vaginal/ Abdominal Pain/ Pressure No   Need to strain/ Push to void No   Need to wait on the toilet before you void No   Unusual " position to urinate (using your hands to push back the vaginal bulge) No   Sensation of incomplete emptying Yes   Past use of pessary device No   If you answered yes to the previous question, please list the devices you have used below.  n/a   Bowel Symptoms: Are you experiencing any of the following?     Constipation No   Diarrhea  No   Hematochezia (bloody stool) No   Incomplete evacuation of stool No   Involuntary loss of formed stool No   Fecal smearing/urgency Yes   Involuntary loss of gas Yes   Vaginal Symptoms: Are you experiencing any of the following?      Abnormal vaginal bleeding  No   Vaginal dryness No   Sexually active  No   Dyspareunia (painful intercourse) No   Estrogen use  No               Past Medical History:   Diagnosis Date    Allergy     Anxiety     Disorder of muscle 9/4/2019    Iron deficiency anemia secondary to inadequate dietary iron intake 7/19/2019    Subclinical hypothyroidism        Past Surgical History:   Procedure Laterality Date    dental implants      Egg retrieval         Current Outpatient Medications   Medication Sig    B INFANTIS/B ANI/B JET/B BIFID (PROBIOTIC DIGESTIVE CARE ORAL) Take 1 capsule by mouth as needed.    calcium-vitamin D3 (CALCIUM 500 + D) 500 mg(1,250mg) -200 unit per tablet Take 1 tablet by mouth 2 (two) times daily with meals.    conjugated estrogens (PREMARIN) vaginal cream 1 g with applicator or dime-sized amt with finger in vagina nightly x 2 weeks, then twice a week thereafter    cyanocobalamin, vitamin B-12, (VITAMIN B-12) 50 mcg tablet Take 50 mcg by mouth once daily.    hydrocortisone 2.5 % cream Apply topically 2 (two) times daily.    jack salguero ointment Apply topically 3 (three) times daily. Apply after feeding. Do not wash off. This compounded medication expires in 30 days.    multivitamin (ONE DAILY MULTIVITAMIN) per tablet Take 1 tablet by mouth once daily.    prenatal vit 55-iron-folic-om3 29-1-430 mg CPKD Take 1 tablet by mouth  "once daily.     No current facility-administered medications for this visit.        Well woman:  Pap:unknown  Mammo:10/2018 normal elevated TC score  Colonoscopy:2010 colitis  Dexa:01/26/2016 Osteopenia of the lumbar spine and hip. FRAX calculation does not support treatment for osteoporosis    ROS:  As per HPI.      Exam  Ht 5' 8" (1.727 m)   Wt 69.8 kg (153 lb 14.1 oz)   BMI 23.40 kg/m²   General: alert and oriented, no acute distress  Respiratory: normal respiratory effort  Abd: soft, non-tender, non-distended    Pelvic  Ext. Genitalia: normal external genitalia. Normal bartholin's and skeens glands  Vagina: + atrophy. Normal vaginal mucosa without lesions. No discharge noted.   Non-tender bladder base without palpable mass.  Cervix: no lesions  Uterus:  uterus is normal size, shape, consistency and nontender   Urethra: no masses or tenderness  Urethral meatus: no lesions, caruncle or prolapse.    The patient was fit with #5 ring with support pessary.  She was able to tolerate the device comfortabley with bending, squatting, valsalva.  She was able to demonstrate independent removal and placement.  She tolerated the procedure well.      Impression  1. Midline cystocele     2. Pelvic pressure in female     3. Fecal smearing     4. Mixed stress and urge urinary incontinence       We reviewed the above issues and discussed options for short-term versus long-term management of her problems.   Plan:     1. Fecal incontinence  --Fiber may better control cholesterol and blood glucose.  Start daily fiber.  Take 1 tsp of fiber powder (psyllium or other sugar-free powder).  Mix in 8 oz of water.  Take x 3-5 days.  Then, increase fiber by 1 tsp every 3-5 days until stool is easy to pass.  ( Metamucil, benefiber )   --Keep a bowel diary   --Consider Lomotil in the future  --Pelvic floor PT  --Endoanal sonogram to evaluate the sphincter   --Anal manometry   --We discussed treatment option including Pelvic floor PT, SNS, and " Sphincteroplasty. Risks, benefits reviewed in detail. For now we will start with pelvic floor PT.   --Consider Colorectal evaluation in the future     2.  Mixed urinary incontinence, urge > stress:   --Bladder diary for 3-7 days, write the number of times you go to the rest room and associated symptoms of urgency or leakage.   --Empty bladder every 3 hours.  Empty well: wait a minute, lean forward on toilet.    --Avoid dietary irritants (see sheet).  Keep diary x 3-5 days to determine your irritants.  --KEGELS: do 10 in AM and 10 in PM, holding each x 10 seconds.  When you feel urge to go, STOP, KEGEL, and when urge has passed, then go to bathroom.  Consider PT in future.    --URGE: Ditropan 10 mg daily or Myrbetriq 50 mg daily.  SE profile reviewed.   Takes 2-4 weeks to see if will have effect.  For dry mouth: get sour, sugar free lozenge or gum. Currently breast feeding.   --STRESS:  Pessary vs. Sling vs impressa       3. Midline cystocele  --trial #5 ring with support pessary-- will order replacement from [employer]  --PESSARY CARE: Remove pessary as frequently as nightly and as infrequently as every 2-3 weeks.  Remove before intercourse.  May need to remove before bowel movements if straining dislodges.   Wash with soap and water (no ).  Replace using small amount of water-based lubricant (like KY jelly) at end being introduced into vagina.      4. RTC 4 weeks for pc    30 minutes were spent in face to face time with this patient  90 % of this time was spent in counseling and/or coordination of care      RENETTA Mane-BC [facility]  Division of Female Pelvic Medicine and Reconstructive Surgery  Department of Obstetrics & Gynecology

## 2019-10-01 ENCOUNTER — TELEPHONE (OUTPATIENT)
Dept: UROGYNECOLOGY | Facility: CLINIC | Age: 49
End: 2019-10-01

## 2019-10-01 ENCOUNTER — PATIENT MESSAGE (OUTPATIENT)
Dept: UROGYNECOLOGY | Facility: CLINIC | Age: 49
End: 2019-10-01

## 2019-10-01 NOTE — TELEPHONE ENCOUNTER
Returned pt call no answer, Left voice message informing pt to contact [facility] at [phone] for pessary receipt.

## 2019-10-01 NOTE — TELEPHONE ENCOUNTER
Pt is requesting a receipt from pessary purchase. Informed pt I'll will check to see if the replacement pessary was delivered with a receipt and give her call back on tomorrow. Pt voiced understanding and call ended.

## 2019-10-01 NOTE — TELEPHONE ENCOUNTER
----- Message from Meli Macahdo sent at 10/1/2019  2:44 PM CDT -----  Contact: TRINI LEVIN [[id number]]  Type:  Patient Returning Call    Who Called: TRINI LEVIN [[id number]]    Who Left Message for Patient: Selin RESTREPO Faye    Does the patient know what this is regarding?:Y     Best Call Back Number:[phone]    Additional Information:

## 2019-10-01 NOTE — TELEPHONE ENCOUNTER
----- Message from Marcell Peña sent at 10/1/2019 12:37 PM CDT -----  Contact: TRINI LEVIN [[id number]]  Name of Who is Calling: TRINI LEVIN [[id number]]      What is the request in detail: Would like to speak with staff in regards to obtaining a receipt for her pessary. Can be sent through the portal, e-mail, or mail. Please advise      Can the clinic reply by [facility]: yes      What Number to Call Back if not in [facility]: [phone]

## 2019-10-04 ENCOUNTER — CLINICAL SUPPORT (OUTPATIENT)
Dept: REHABILITATION | Facility: HOSPITAL | Age: 49
End: 2019-10-04
Attending: ADVANCED PRACTICE MIDWIFE
Payer: COMMERCIAL

## 2019-10-04 DIAGNOSIS — M62.9 DISORDER OF MUSCLE: ICD-10-CM

## 2019-10-04 PROCEDURE — 97112 NEUROMUSCULAR REEDUCATION: CPT | Mod: PO

## 2019-10-04 NOTE — PATIENT INSTRUCTIONS
Home Program: 10/4/2019    Kegels in standing and while walkin. Stand comfortably with legs and buttocks relaxed.  Make sure that pessary is in.  2. Squeeze and LIFT the muscles that stop the flow of urine and passage of gas.  Keep legs and buttock muscles quiet.  3. Hold lift for 10 seconds without holding breath.  4. Release and DROP for 10 seconds.  5. Repeat 10 times, 2 sets, 3 times per day      No Kegels while urinating!      TA sets: work on drawing in your lower belly as if zipping tight pants.  This can be done in plank, in sitting, lying down, standing.  Start by breathing in and letting your belly go; then breathe out SLOWLY and draw in your TA.      Good to do in addition:  Bridging, glut sets, mini squats with core activation.

## 2019-10-04 NOTE — PROGRESS NOTES
OUTPATIENT PHYSICAL THERAPY DAILY NOTE       Patient: Dipti Patel [facility] #:  [id number]    Diagnosis:   Encounter Diagnosis   Name Primary?    Disorder of muscle       Date of treatment: 10/04/2019     Time in: 10:50  Time out: 11:50  Billable time:55 minutes  Visit #: 3  Auth: 20 exp 12/31  POC expiration: 12/4/2019    SUBJECTIVE   Pt reports: that she was issued a pessary, and is not using the sensor.  Still doing Kegels and glut work.  She is requesting that I perform an internal rectal muscle assessment today.   She reports that she does not have the pessary in right now.    Pain: 0/10 on VAS scale  Pain location: NA    OBJECTIVE     Pt rec'd an internal rectal muscle assessment per her request.  Her EAS demonstrated good tone, with weak closure with cueing.  Levators tested at 2/5 B'ly.      Neuromuscular Re-education to develop coordination for 55 minutes including:  Kegels with assist of SEMG.  We worked in supine and standing with external lead wires. She demonstrated normal baseline resting, improved WR rise, and good holding ability in 10 sec Kegels.  In standing Kegels her WR rise was fair, holding good.  She was instructed to perform the Kegels in standing and while walking.  We reviewed benefit of doing GS, bridges, and mini squats, and basically anything that engages gluts.  We briefly spoke about Estim for muscle uptraining using the sensor- I advised her to continue working in standing on Kegels and we would revisit this next session.  Pt verbalized understanding of all instruction per handout issued.      Education: Discussed progression of plan of care with patient; educated pt in activity modification; reviewed HEP with pt. Pt demonstrated and verbalized understanding of all instruction and was provided with a handout of HEP (see Patient Instructions).      ASSESSMENT      Pt tolerated entire treatment well with no visible adverse effects. Pt is working hard, but has weak  michelle.  Will reassess need for stim next session.  Will the patient continue to benefit from skilled PT intervention? Yes  Medical necessity demonstrated by: Skilled PT supervision required for HEP and treatment progression  Progress towards goals:  satisfactory  New/Revised goals: none      PLAN     Continue with established Plan of Care, working toward established PT goals.

## 2019-10-07 ENCOUNTER — PATIENT MESSAGE (OUTPATIENT)
Dept: ENDOCRINOLOGY | Facility: CLINIC | Age: 49
End: 2019-10-07

## 2019-10-07 DIAGNOSIS — M85.80 LOW BONE MASS: Primary | ICD-10-CM

## 2019-10-20 ENCOUNTER — PATIENT MESSAGE (OUTPATIENT)
Dept: OBSTETRICS AND GYNECOLOGY | Facility: CLINIC | Age: 49
End: 2019-10-20

## 2019-10-25 ENCOUNTER — OFFICE VISIT (OUTPATIENT)
Dept: UROGYNECOLOGY | Facility: CLINIC | Age: 49
End: 2019-10-25
Payer: COMMERCIAL

## 2019-10-25 VITALS
HEIGHT: 68 IN | WEIGHT: 151.69 LBS | DIASTOLIC BLOOD PRESSURE: 70 MMHG | BODY MASS INDEX: 22.99 KG/M2 | SYSTOLIC BLOOD PRESSURE: 100 MMHG

## 2019-10-25 DIAGNOSIS — N39.46 MIXED STRESS AND URGE URINARY INCONTINENCE: ICD-10-CM

## 2019-10-25 DIAGNOSIS — N81.11 MIDLINE CYSTOCELE: Primary | ICD-10-CM

## 2019-10-25 DIAGNOSIS — R15.1 FECAL SMEARING: ICD-10-CM

## 2019-10-25 DIAGNOSIS — Z46.89 PESSARY MAINTENANCE: ICD-10-CM

## 2019-10-25 PROCEDURE — 3008F PR BODY MASS INDEX (BMI) DOCUMENTED: ICD-10-PCS | Mod: CPTII,S$GLB,, | Performed by: NURSE PRACTITIONER

## 2019-10-25 PROCEDURE — 99999 PR PBB SHADOW E&M-EST. PATIENT-LVL II: ICD-10-PCS | Mod: PBBFAC,,, | Performed by: NURSE PRACTITIONER

## 2019-10-25 PROCEDURE — 99213 PR OFFICE/OUTPT VISIT, EST, LEVL III, 20-29 MIN: ICD-10-PCS | Mod: S$GLB,,, | Performed by: NURSE PRACTITIONER

## 2019-10-25 PROCEDURE — 99999 PR PBB SHADOW E&M-EST. PATIENT-LVL II: CPT | Mod: PBBFAC,,, | Performed by: NURSE PRACTITIONER

## 2019-10-25 PROCEDURE — 99213 OFFICE O/P EST LOW 20 MIN: CPT | Mod: S$GLB,,, | Performed by: NURSE PRACTITIONER

## 2019-10-25 PROCEDURE — 3008F BODY MASS INDEX DOCD: CPT | Mod: CPTII,S$GLB,, | Performed by: NURSE PRACTITIONER

## 2019-10-25 NOTE — PROGRESS NOTES
Urogyn follow up  10/25/2019  .  [facility]   [address] [address]    Dipti Pacheco  [id number]  1970      Dipti Pacheco is a 49 y.o. here for a urogyn follow up for pessary care.  She is independent in use.  Denies pain or bleeding.     Last HPI from 2019  HPI 49 Y O F  has a past medical history of Allergy, Anxiety, Iron deficiency anemia secondary to inadequate dietary iron intake (2019), and Subclinical hypothyroidism. referred by NP Kristin Wallace for pelvic organ prolapse and fecal incompetence. She began in after She began Kegel exercise early on but stopped and has now restarted. She had a induced due to non fetal heart tracing which lead to an pulido balloon induction of labor at 37 wks IVF pregnancy. She had a  approximately 38 hours in labor no forceps no vacuum. Immediately after the delivery there was very little sensation of the pelvis. She denies previous episodes of fecal incontinence, since the delivery it ranges from daily to every others day. The amount varies from a small amount to large amounts especially more bothersome when it's more loose.          [facility]      Question 2019  9:25 PM CDT - Filed by Patient on 2019   General Urogynecology: Are you experiencing the following?     Dysuria (painful urination) No   Nocturia:  waking up at night to empty your bladder  Yes   If you answered yes to the previous question, how many times does this happen per night? 3-4   Enuresis (urine loss during sleep) No   Dribbling urine after you urinate No   Hematuria (urine appears red) No   Type of stream Weak   Urinary frequency: How often a day are you going to the bathroom per day?  10-15   Urinary Tract Infections: How many Urinary Tract Infections have you had in the past year? I have not had a UTI in the past year   If you have had a UTI in the past year, what treatments have you had so far?  I  "have not had a UTI in the past year   Urinary Incontinence (General): Are you experiencing the following?     Past consultation for incontinence: Have you ever seen someone for the evaluation of incontinence? No   If you answered yes to the previous question, please select all the therapies you have tried.  N/a- I answered no to the previous question   Please note the effectiveness of the therapies.     Need to wear protection to keep clothes dry  Yes   If you answered yes to the previous question, please basilia the protection you use.  Panty liner   If you wear protection, how much wetness is typically on each pad? Slight   If you wear protection, how often do you have to change per day, if applicable?  2   Stress Symptoms: Are you experiencing the following?     Leakage of urine with cough, laugh and/or sneeze No   If you answered yes to the previous question, what is the frequency in days, weeks and/or months? Never   Leakage of urine with sex No   Leakage of urine with bending/ lifting No   Leakage of urine with briskly walking or jogging No   If you lose urine for any other reason not previously mentioned, please note it below, if applicable.  n/a   Urge Symptoms: Are you experiencing the following?     Urgency ("got to go" feeling) Yes   Urge: How frequently do you feel an urge to urinate (feeling like you "gotta go" to the bathroom and can't wait) Several times a day   Do you experience a leakage of urine when you have a feeling of urgency?  Yes   Leakage of urine when unaware No   Past use of anticholinergics (medications used to treat overactive bladder) No   If you answered yes to the previous question, please basilia the anticholinergics you have used:      Have you ever used Mirbetriq (aka Mirabegron)?  No   Prolapse Symptoms: Are you experiencing any of the following?      Falling out/ Bulging/ Heaviness in the vagina No   Vaginal/ Abdominal Pain/ Pressure No   Need to strain/ Push to void No   Need to wait " on the toilet before you void No   Unusual position to urinate (using your hands to push back the vaginal bulge) No   Sensation of incomplete emptying Yes   Past use of pessary device No   If you answered yes to the previous question, please list the devices you have used below.  n/a   Bowel Symptoms: Are you experiencing any of the following?     Constipation No   Diarrhea  No   Hematochezia (bloody stool) No   Incomplete evacuation of stool No   Involuntary loss of formed stool No   Fecal smearing/urgency Yes   Involuntary loss of gas Yes   Vaginal Symptoms: Are you experiencing any of the following?      Abnormal vaginal bleeding  No   Vaginal dryness No   Sexually active  No   Dyspareunia (painful intercourse) No   Estrogen use  No               Past Medical History:   Diagnosis Date    Allergy     Anxiety     Disorder of muscle 9/4/2019    Iron deficiency anemia secondary to inadequate dietary iron intake 7/19/2019    Subclinical hypothyroidism        Past Surgical History:   Procedure Laterality Date    dental implants      Egg retrieval         Current Outpatient Medications   Medication Sig    B INFANTIS/B ANI/B JET/B BIFID (PROBIOTIC DIGESTIVE CARE ORAL) Take 1 capsule by mouth as needed.    calcium-vitamin D3 (CALCIUM 500 + D) 500 mg(1,250mg) -200 unit per tablet Take 1 tablet by mouth 2 (two) times daily with meals.    conjugated estrogens (PREMARIN) vaginal cream 1 g with applicator or dime-sized amt with finger in vagina nightly x 2 weeks, then twice a week thereafter    cyanocobalamin, vitamin B-12, (VITAMIN B-12) 50 mcg tablet Take 50 mcg by mouth once daily.    hydrocortisone 2.5 % cream Apply topically 2 (two) times daily.    jack salguero ointment Apply topically 3 (three) times daily. Apply after feeding. Do not wash off. This compounded medication expires in 30 days.    multivitamin (ONE DAILY MULTIVITAMIN) per tablet Take 1 tablet by mouth once daily.    prenatal vit  "55-iron-folic-om3 29-1-430 mg CPKD Take 1 tablet by mouth once daily.     No current facility-administered medications for this visit.        Well woman:  Pap:unknown  Mammo:10/2018 normal elevated TC score  Colonoscopy:2010 colitis  Dexa:01/26/2016 Osteopenia of the lumbar spine and hip. FRAX calculation does not support treatment for osteoporosis    ROS:  As per HPI.      Exam  /70 (BP Location: Right arm, Patient Position: Sitting, BP Method: Large (Manual))   Ht 5' 8" (1.727 m)   Wt 68.8 kg (151 lb 10.8 oz)   BMI 23.06 kg/m²   General: alert and oriented, no acute distress  Respiratory: normal respiratory effort  Abd: soft, non-tender, non-distended    Pelvic  Ext. Genitalia: normal external genitalia. Normal bartholin's and skeens glands  Vagina: + atrophy. Normal vaginal mucosa without lesions. No discharge noted.   Non-tender bladder base without palpable mass.  #5 ring with support pessary in place.   Cervix: no lesions  Uterus:  uterus is normal size, shape, consistency and nontender   Urethra: no masses or tenderness  Urethral meatus: no lesions, caruncle or prolapse.    Pessary removed without difficulty. Washed with soap and water. Reinserted without difficulty.     Impression  1. Midline cystocele     2. Mixed stress and urge urinary incontinence     3. Fecal smearing     4. Pessary maintenance       We reviewed the above issues and discussed options for short-term versus long-term management of her problems.   Plan:     1. Fecal incontinence  --Fiber may better control cholesterol and blood glucose.  Start daily fiber.  Take 1 tsp of fiber powder (psyllium or other sugar-free powder).  Mix in 8 oz of water.  Take x 3-5 days.  Then, increase fiber by 1 tsp every 3-5 days until stool is easy to pass.  ( Metamucil, benefiber )   --Keep a bowel diary   --Consider Lomotil in the future  --Pelvic floor PT  --Endoanal sonogram to evaluate the sphincter   --Anal manometry   --We discussed treatment " option including Pelvic floor PT, SNS, and Sphincteroplasty. Risks, benefits reviewed in detail. For now we will start with pelvic floor PT.   --Consider Colorectal evaluation in the future     2.  Mixed urinary incontinence, urge > stress:   --Bladder diary for 3-7 days, write the number of times you go to the rest room and associated symptoms of urgency or leakage.   --Empty bladder every 3 hours.  Empty well: wait a minute, lean forward on toilet.    --Avoid dietary irritants (see sheet).  Keep diary x 3-5 days to determine your irritants.  --KEGELS: do 10 in AM and 10 in PM, holding each x 10 seconds.  When you feel urge to go, STOP, KEGEL, and when urge has passed, then go to bathroom.  Consider PT in future.    --URGE: Ditropan 10 mg daily or Myrbetriq 50 mg daily.  SE profile reviewed.   Takes 2-4 weeks to see if will have effect.  For dry mouth: get sour, sugar free lozenge or gum. Currently breast feeding.   --STRESS:  Pessary vs. Sling vs impressa       3. Midline cystocele  --trial #5 ring with support pessary-- will order replacement from [employer]  --PESSARY CARE: Remove pessary as frequently as nightly and as infrequently as every 2-3 weeks.  Remove before intercourse.  May need to remove before bowel movements if straining dislodges.   Wash with soap and water (no ).  Replace using small amount of water-based lubricant (like KY jelly) at end being introduced into vagina.      4. RTC 4 weeks for pc    30 minutes were spent in face to face time with this patient  90 % of this time was spent in counseling and/or coordination of care      RENETTA Mane-BC [facility]  Division of Female Pelvic Medicine and Reconstructive Surgery  Department of Obstetrics & Gynecology

## 2019-10-29 ENCOUNTER — PATIENT MESSAGE (OUTPATIENT)
Dept: UROGYNECOLOGY | Facility: CLINIC | Age: 49
End: 2019-10-29

## 2019-10-31 ENCOUNTER — CLINICAL SUPPORT (OUTPATIENT)
Dept: REHABILITATION | Facility: HOSPITAL | Age: 49
End: 2019-10-31
Attending: ADVANCED PRACTICE MIDWIFE
Payer: COMMERCIAL

## 2019-10-31 DIAGNOSIS — M62.9 DISORDER OF MUSCLE: ICD-10-CM

## 2019-10-31 PROCEDURE — 97112 NEUROMUSCULAR REEDUCATION: CPT | Mod: PO

## 2019-10-31 NOTE — PROGRESS NOTES
OUTPATIENT PHYSICAL THERAPY DAILY NOTE       Patient: Dipti Patel [facility] #:  [id number]    Diagnosis:   Encounter Diagnosis   Name Primary?    Disorder of muscle       Date of treatment: 10/31/2019     Time in: 3:14  Time out: 4:05  Billable time: 45 minutes  Visit #: 4  Auth: 20 exp 12/31  POC expiration: 12/4/2019    SUBJECTIVE   Pt reports: that she is tolerating the pessary fairly well- is sometimes painful to take out and occasionally feels the pressure.  She continues to have urgency and urge fecal incontinence on a daily basis.  She is now at work dealing with this.  Has had to leave work to clean up.  Will pass gas in front of colleagues.    Pain: 0/10 on VAS scale  Pain location: NA    OBJECTIVE     Neuromuscular Re-education to develop coordination for 45 minutes including:  Kegels with assist of SEMG.  We worked in supine and standing with external lead wires. She demonstrated normal baseline resting, good WR rise, and good holding ability in 10 sec Kegels. Not much change noted since last session.   In standing Kegels her WR rise was fair, holding good.    We briefly spoke about Estim with rectal sensor- She agreed to trial and rec'd NMES to the levator ani intrarectally with CTS unit at 4uV, 5 sec on, 5 sec off duty cycle.  She was able to sense the stim turning on each time, but could not sense it going off.  We spoke about her getting a rental STM10 unit, and she was given the [employer] phone number to call to discuss payment.   Pt verbalized understanding of all instruction.    Education: Discussed progression of plan of care with patient; educated pt in activity modification; reviewed HEP with pt. Pt demonstrated and verbalized understanding of all instruction and was provided with a handout of HEP (see Patient Instructions).      ASSESSMENT      Pt tolerated entire treatment well with no visible adverse effects. Pt is working hard, but has weak levators and is not really  getting stronger with exercise alone.  Hopefully she will be able to work out finances to rent a home stim unit.    Will the patient continue to benefit from skilled PT intervention? Yes  Medical necessity demonstrated by: Skilled PT supervision required for HEP and treatment progression  Progress towards goals:  satisfactory  New/Revised goals: none      PLAN     Continue with established Plan of Care, working toward established PT goals.

## 2019-10-31 NOTE — PATIENT INSTRUCTIONS
[employer]    [email or web address]    [phone]

## 2019-11-12 ENCOUNTER — OFFICE VISIT (OUTPATIENT)
Dept: UROGYNECOLOGY | Facility: CLINIC | Age: 49
End: 2019-11-12
Payer: COMMERCIAL

## 2019-11-12 VITALS
WEIGHT: 149.69 LBS | SYSTOLIC BLOOD PRESSURE: 104 MMHG | DIASTOLIC BLOOD PRESSURE: 70 MMHG | BODY MASS INDEX: 22.69 KG/M2 | HEIGHT: 68 IN

## 2019-11-12 DIAGNOSIS — N81.10 PROLAPSE OF ANTERIOR VAGINAL WALL: Primary | ICD-10-CM

## 2019-11-12 DIAGNOSIS — N39.46 MIXED STRESS AND URGE URINARY INCONTINENCE: ICD-10-CM

## 2019-11-12 DIAGNOSIS — N81.6 POSTERIOR VAGINAL WALL PROLAPSE: ICD-10-CM

## 2019-11-12 DIAGNOSIS — R15.1 FECAL SMEARING: ICD-10-CM

## 2019-11-12 PROCEDURE — 3008F BODY MASS INDEX DOCD: CPT | Mod: CPTII,S$GLB,, | Performed by: OBSTETRICS & GYNECOLOGY

## 2019-11-12 PROCEDURE — 99999 PR PBB SHADOW E&M-EST. PATIENT-LVL III: ICD-10-PCS | Mod: PBBFAC,,, | Performed by: OBSTETRICS & GYNECOLOGY

## 2019-11-12 PROCEDURE — 99213 PR OFFICE/OUTPT VISIT, EST, LEVL III, 20-29 MIN: ICD-10-PCS | Mod: S$GLB,,, | Performed by: OBSTETRICS & GYNECOLOGY

## 2019-11-12 PROCEDURE — 3008F PR BODY MASS INDEX (BMI) DOCUMENTED: ICD-10-PCS | Mod: CPTII,S$GLB,, | Performed by: OBSTETRICS & GYNECOLOGY

## 2019-11-12 PROCEDURE — 99999 PR PBB SHADOW E&M-EST. PATIENT-LVL III: CPT | Mod: PBBFAC,,, | Performed by: OBSTETRICS & GYNECOLOGY

## 2019-11-12 PROCEDURE — 99213 OFFICE O/P EST LOW 20 MIN: CPT | Mod: S$GLB,,, | Performed by: OBSTETRICS & GYNECOLOGY

## 2019-11-21 ENCOUNTER — PATIENT MESSAGE (OUTPATIENT)
Dept: UROGYNECOLOGY | Facility: CLINIC | Age: 49
End: 2019-11-21

## 2019-11-22 DIAGNOSIS — K64.1 GRADE II HEMORRHOIDS: ICD-10-CM

## 2019-11-22 NOTE — TELEPHONE ENCOUNTER
Pt requesting refill on hydrocortisone it was not originally order by you but pt is using it for the rectum states she think its her hemorrhoid and the prescription she has now is about to run out.

## 2019-11-23 RX ORDER — HYDROCORTISONE 25 MG/G
CREAM TOPICAL 2 TIMES DAILY
Qty: 20 G | Refills: 1 | Status: SHIPPED | OUTPATIENT
Start: 2019-11-23

## 2019-11-24 NOTE — PROGRESS NOTES
Subjective:       Patient ID: Dipti Pacheco is a 49 y.o. female.    Chief Complaint:  Vaginal Prolapse (follow up)      History of Present Illness  HPI 49 Y O F  has a past medical history of Allergy, Anxiety, Disorder of muscle (2019), Iron deficiency anemia secondary to inadequate dietary iron intake (2019), and Subclinical hypothyroidism. referred by NP Kristin Wallace for pelvic organ prolapse and fecal incompetence. She began in after She began Kegel exercise early on but stopped and has now restarted. She had a induced due to non fetal heart tracing which lead to an pulido balloon induction of labor at 37 wks IVF pregnancy. She had a  approximately 38 hours in labor no forceps no vacuum. Immediately after the delivery there was very little sensation of the pelvis. She denies previous episodes of fecal incontinence, since the delivery it ranges from daily to every others day. The amount varies from a small amount to large amounts especially more bothersome when it's more loose.      Interval  HP since the last visit   1)  UI:  (+) FLAQUITO  (+) UUI   (--) pads:.  Daytime frequency: Q 4 -5 hours.  Nocturia: No:    (-) dysuria-  (--) hematuria,  (--) frequent UTIs.  (+) complete bladder emptying.     2)  POP:  Absent with pessary in place.   Symptoms:(--)  .  (--) vaginal bleeding. (--) vaginal discharge. (+) sexually active.  (-) dyspareunia.   (--)  Vaginal dryness.  (--) vaginal estrogen use.    3)  BM:  (+) constipation/straining.  (--) chronic diarrhea. (--) hematochezia.  (--) fecal incontinence.  (+) fecal smearing/urgency.  (--) incomplete evacuation.     4)Pessary- denies pain,bleeding or current discharge. Using # 5 ring  ring with support able to remove and place on a bimonthly basis       [facility]     Question 2019  9:25 PM CDT - Filed by Patient on 2019   General Urogynecology: Are you experiencing the following?    Dysuria (painful urination) No   Nocturia:  waking  "up at night to empty your bladder  Yes   If you answered yes to the previous question, how many times does this happen per night? 3-4   Enuresis (urine loss during sleep) No   Dribbling urine after you urinate No   Hematuria (urine appears red) No   Type of stream Weak   Urinary frequency: How often a day are you going to the bathroom per day?  10-15   Urinary Tract Infections: How many Urinary Tract Infections have you had in the past year? I have not had a UTI in the past year   If you have had a UTI in the past year, what treatments have you had so far?  I have not had a UTI in the past year   Urinary Incontinence (General): Are you experiencing the following?    Past consultation for incontinence: Have you ever seen someone for the evaluation of incontinence? No   If you answered yes to the previous question, please select all the therapies you have tried.  N/a- I answered no to the previous question   Please note the effectiveness of the therapies.    Need to wear protection to keep clothes dry  Yes   If you answered yes to the previous question, please bsailia the protection you use.  Panty liner   If you wear protection, how much wetness is typically on each pad? Slight   If you wear protection, how often do you have to change per day, if applicable?  2   Stress Symptoms: Are you experiencing the following?    Leakage of urine with cough, laugh and/or sneeze No   If you answered yes to the previous question, what is the frequency in days, weeks and/or months? Never   Leakage of urine with sex No   Leakage of urine with bending/ lifting No   Leakage of urine with briskly walking or jogging No   If you lose urine for any other reason not previously mentioned, please note it below, if applicable.  n/a   Urge Symptoms: Are you experiencing the following?    Urgency ("got to go" feeling) Yes   Urge: How frequently do you feel an urge to urinate (feeling like you "gotta go" to the bathroom and can't wait) Several " times a day   Do you experience a leakage of urine when you have a feeling of urgency?  Yes   Leakage of urine when unaware No   Past use of anticholinergics (medications used to treat overactive bladder) No   If you answered yes to the previous question, please basilia the anticholinergics you have used:     Have you ever used Mirbetriq (aka Mirabegron)?  No   Prolapse Symptoms: Are you experiencing any of the following?     Falling out/ Bulging/ Heaviness in the vagina No   Vaginal/ Abdominal Pain/ Pressure No   Need to strain/ Push to void No   Need to wait on the toilet before you void No   Unusual position to urinate (using your hands to push back the vaginal bulge) No   Sensation of incomplete emptying Yes   Past use of pessary device No   If you answered yes to the previous question, please list the devices you have used below.  n/a   Bowel Symptoms: Are you experiencing any of the following?    Constipation No   Diarrhea  No   Hematochezia (bloody stool) No   Incomplete evacuation of stool No   Involuntary loss of formed stool No   Fecal smearing/urgency Yes   Involuntary loss of gas Yes   Vaginal Symptoms: Are you experiencing any of the following?     Abnormal vaginal bleeding  No   Vaginal dryness No   Sexually active  No   Dyspareunia (painful intercourse) No   Estrogen use  No     GYN & OB History  No LMP recorded.   Date of Last Pap: No result found    OB History    Para Term  AB Living   1 1 1     1   SAB TAB Ectopic Multiple Live Births         0 1      # Outcome Date GA Lbr Anurag/2nd Weight Sex Delivery Anes PTL Lv   1 Term 19 37w3d  2.8 kg (6 lb 2.8 oz) M Vag-Spont EPI N GILLIAN      Complications: Chorioamnionitis     OB  Largest: 6#  Forceps:  Episiotomy: 2nd degree    GYN  Menarche: 15  Menstrual cycle: nomral  Menopause: amenorrhea prior to pregnancy   Hysterectomy: no  Ovaries: present       Review of Systems  Review of Systems   Constitutional: Negative for activity change,  appetite change, chills, diaphoresis, fatigue, fever and unexpected weight change.   Respiratory: Negative for cough.    Gastrointestinal: Negative for abdominal distention, abdominal pain, anal bleeding, blood in stool, constipation, diarrhea, nausea, rectal pain and vomiting.   Genitourinary: Negative for decreased urine volume, difficulty urinating, dyspareunia, dysuria, enuresis, flank pain, frequency, genital sores, hematuria, menstrual problem, pelvic pain, urgency, vaginal bleeding, vaginal discharge and vaginal pain.   Musculoskeletal: Negative for back pain.   Skin: Negative for color change, pallor, rash and wound.   Allergic/Immunologic: Negative for environmental allergies, food allergies and immunocompromised state.   Hematological: Negative for adenopathy. Does not bruise/bleed easily.   Psychiatric/Behavioral: Negative for agitation, behavioral problems, confusion and sleep disturbance.           Objective:     Physical Exam   Constitutional: She is oriented to person, place, and time. She appears well-developed.   HENT:   Head: Normocephalic and atraumatic.   Eyes: Conjunctivae and EOM are normal.   Neck: Normal range of motion. Neck supple.   Cardiovascular: Normal rate, regular rhythm, S1 normal, S2 normal, normal heart sounds and intact distal pulses.   Pulmonary/Chest: Effort normal and breath sounds normal. She exhibits no tenderness.   Abdominal: Soft. Bowel sounds are normal. She exhibits no distension and no mass. There is no hepatosplenomegaly, splenomegaly or hepatomegaly. There is no tenderness. There is no rigidity, no rebound, no guarding and no CVA tenderness. No hernia.   Genitourinary: Pelvic exam was performed with patient supine. Rectum normal, vagina normal, uterus normal, cervix normal, skenes normal and bartholins normal. Right labia normal and left labia normal. Urethra exhibits no urethral caruncle, no urethral diverticulum, no urethral mass and no hypermobility. Right  bartholin is not enlarged and not tender. Left bartholin is not enlarged and not tender. Rectal exam shows resting tone normal and active tone normal. Rectal exam shows no external hemorrhoid, no fissure, no tenderness, anal tone normal and no dovetailing. Guaiac negative stool. There is a cystocele and unspecified prolapse of vaginal walls in the vagina. No foreign body, tenderness, bleeding, fistula, mesh exposure or lavator tenderness in the vagina. No vaginal discharge found. Right adnexum displays no mass and no tenderness. Left adnexum displays no mass and no tenderness. Cervix exhibits no motion tenderness and no discharge. Uterus is not tender and not experiencing uterine prolapse.   PVR: 100 ML  Empty cough stress test: Negative.  Kegel: 1/5    POP-Q  Aa: -1 Ba: -1 C: -4   GH: 3 PB: 2 TVL: 8   Ap: -1 Bp: -1 D: -6                     Musculoskeletal: Normal range of motion.   Lymphadenopathy:     She has no axillary adenopathy.   Neurological: She is alert and oriented to person, place, and time. She has normal strength and normal reflexes. Cranial nerves II through XII intact. No cranial nerve deficit.   Skin: Skin is warm, dry and intact.   Psychiatric: She has a normal mood and affect. Her speech is normal and behavior is normal. Judgment normal. Cognition and memory are normal.             HCM  Pap's: normal   Mammo: normal 2018  Colonoscopy n/a  Dexa: n/a    Assessment:        1. Prolapse of anterior vaginal wall    2. Posterior vaginal wall prolapse    3. Mixed stress and urge urinary incontinence    4. Fecal smearing             Plan:           1. Fecal incontinence: improved  --Fiber may better control cholesterol and blood glucose.  Start daily fiber.  Take 1 tsp of fiber powder (psyllium or other sugar-free powder).  Mix in 8 oz of water.  Take x 3-5 days.  Then, increase fiber by 1 tsp every 3-5 days until stool is easy to pass.  ( Metamucil, benefiber )   --Keep a bowel diary   --Consider Lomotil  in the future  --Pelvic floor PT  --Endoanal sonogram to evaluate the sphincter   --Anal manometry   --We discussed treatment option including Pelvic floor PT, SNS, and Sphincteroplasty. Risks, benefits reviewed in detail. Continue pelvic floor PT     2.  Mixed urinary incontinence, urge > stress: improved with pessary   --Bladder diary for 3-7 days, write the number of times you go to the rest room and associated symptoms of urgency or leakage.   --Empty bladder every 3 hours.  Empty well: wait a minute, lean forward on toilet.    --Avoid dietary irritants (see sheet).  Keep diary x 3-5 days to determine your irritants.  --KEGELS: do 10 in AM and 10 in PM, holding each x 10 seconds.  When you feel urge to go, STOP, KEGEL, and when urge has passed, then go to bathroom.  Continue pelvic floor PT.    --URGE: Ditropan 10 mg daily or Myrbetriq 50 mg daily.  SE profile reviewed.   Takes 2-4 weeks to see if will have effect.  For dry mouth: get sour, sugar free lozenge or gum. Currently breast feeding.   --STRESS:  Pessary vs. Sling vs impressa     Prolapse: Apical prolapse, Stage 3 anterior and posterior vaginal wall prolapse   --Pessary benefit discussed with patient, fitted for a # 5 pessary   --Daily pelvic floor exercises as assigned, consider Pelvic floor PT  --Non surgical options discussed with patient      Approximately 30 min were spent in consult, 90 % in discussion.     Laury Casillas DO  Female Pelvic Medicine and Reconstructive Surgery  [facility] [place], [place]

## 2019-12-05 ENCOUNTER — CLINICAL SUPPORT (OUTPATIENT)
Dept: REHABILITATION | Facility: HOSPITAL | Age: 49
End: 2019-12-05
Attending: ADVANCED PRACTICE MIDWIFE
Payer: COMMERCIAL

## 2019-12-05 DIAGNOSIS — M62.9 DISORDER OF MUSCLE: ICD-10-CM

## 2019-12-05 PROCEDURE — 97112 NEUROMUSCULAR REEDUCATION: CPT | Mod: PO

## 2019-12-05 NOTE — PATIENT INSTRUCTIONS
[place] home program is still 10 sec work, 10 sec rest, 10 times, 4 times per day.  (Remember 3 minutes and 20 sec).    Standing position is best to start.    Progress to motion while holding Kegel as follows:    1. Standing weight shifts (side to side, back and forth with feet staggered)  2. Toe taps while holding. (one foot only, then both feet.

## 2019-12-05 NOTE — PROGRESS NOTES
OUTPATIENT PHYSICAL THERAPY DAILY NOTE       Patient: Dipti Patel [facility] #:  [id number]    Diagnosis:   Encounter Diagnosis   Name Primary?    Disorder of muscle       Date of treatment: 12/05/2019     Time in: 2:15 (late arrival)  Time out: 3:00  Billable time: 45 minutes  Visit #: 5  Auth: 20 exp 12/31  POC expiration: 12/4/2019    SUBJECTIVE   Pt reports: that she is still wearing her pessary.  She had a bout of constipation with less leakage, but things are back to normal now and is now leaking almost every day, in varying amounts.  Still not controlling gas.  She confessed that prior to last session she had stopped doing her home exercises due to trying to adjust to her new routine with work and .  She has been doing them more over the past few weeks.    Pain: 0/10 on VAS scale  Pain location: NA    OBJECTIVE     Neuromuscular Re-education to develop coordination for 45 minutes including:  Kegels with assist of SEMG.  We worked in supine and standing with external lead wires. She demonstrated normal baseline resting, good WR rise, and good holding ability in 10 sec Kegels. Activity in supine Kegels was increased overall compared with last session.    In standing Kegels her WR rise was fair, holding good.    She told me that she cannot afford a home stim unit right now.  She did note that when she tries to walk and do Kegels, she cannot feel any muscle activity like she does when she stands still.    We reviewed progressing from static standing to Kegels with weight shift, to Kegels with toe tap, to slow walking.   Pt verbalized understanding of all instruction.  I instructed her to contact me if she is ready to do stim in the future, and we would see about getting a re-referral to have her come in for instruction.  Pt agreed with this plan.      Education: Discussed progression of plan of care with patient; educated pt in activity modification; reviewed HEP with pt. Pt demonstrated  and verbalized understanding of all instruction and was provided with a handout of HEP (see Patient Instructions).      ASSESSMENT      Pt tolerated entire treatment well with no visible adverse effects. I'm hopeful that when she stops breast feeding her continence will improve- she now knows that this will not happen without her consistently working on strengthening.  She has all she can handle right now.  No further need for PT services at this time.     goals: Pt will report improved ability to perform ADLs (ie. dressing, bathing, functional transfers) with little (drops) to no urinary leakage 7/7 days per week.NOT MET  Pt will report improved ability to perform iADLs (ie. driving, home chores, grocery shopping) with little (drops) to no urinary leakage 7/7 days per week. NOT MET  Pt will report improved ability to manage bladder spasms appropriately 100% of the time for an improvement in urinary frequency/urgency. MET  Pt will report a decrease in pad use to 2 pads per day. MET  Pt will be independent with double voiding techniques 100% of the time to ensure full bladder emptying and decrease pt's risk of infection. MET  Pt will report improved ability to perform iADLs (ie. driving, home chores, grocery shopping) with little (drops) to no fecal leakage 7/7 days per week. NOT MET  Pt/family will be independent with HEP for continued self-management of symptoms.MET      PLAN     D/c PT

## 2019-12-17 ENCOUNTER — PATIENT MESSAGE (OUTPATIENT)
Dept: OBSTETRICS AND GYNECOLOGY | Facility: CLINIC | Age: 49
End: 2019-12-17

## 2019-12-23 ENCOUNTER — HOSPITAL ENCOUNTER (OUTPATIENT)
Dept: RADIOLOGY | Facility: OTHER | Age: 49
Discharge: HOME | End: 2019-12-23
Attending: INTERNAL MEDICINE
Payer: COMMERCIAL

## 2019-12-23 DIAGNOSIS — M85.80 LOW BONE MASS: ICD-10-CM

## 2019-12-23 PROCEDURE — 77080 DXA BONE DENSITY AXIAL: CPT | Mod: TC

## 2019-12-23 PROCEDURE — 77080 DXA BONE DENSITY AXIAL: CPT | Mod: 26,,, | Performed by: RADIOLOGY

## 2019-12-23 PROCEDURE — 77080 DEXA BONE DENSITY SPINE HIP: ICD-10-PCS | Mod: 26,,, | Performed by: RADIOLOGY

## 2019-12-25 ENCOUNTER — PATIENT MESSAGE (OUTPATIENT)
Dept: ENDOCRINOLOGY | Facility: CLINIC | Age: 49
End: 2019-12-25

## 2019-12-25 DIAGNOSIS — M85.80 LOW BONE MASS: Primary | ICD-10-CM

## 2019-12-30 ENCOUNTER — HOSPITAL ENCOUNTER (OUTPATIENT)
Dept: RADIOLOGY | Facility: OTHER | Age: 49
Discharge: HOME | End: 2019-12-30
Attending: INTERNAL MEDICINE
Payer: COMMERCIAL

## 2019-12-30 DIAGNOSIS — M85.80 LOW BONE MASS: ICD-10-CM

## 2019-12-30 PROCEDURE — 77080 DXA BONE DENSITY AXIAL: CPT | Mod: TC

## 2019-12-30 PROCEDURE — 77080 DXA BONE DENSITY AXIAL: CPT | Mod: 26,,, | Performed by: RADIOLOGY

## 2019-12-30 PROCEDURE — 77080 DEXA BONE DENSITY SPINE HIP: ICD-10-PCS | Mod: 26,,, | Performed by: RADIOLOGY

## 2019-12-31 ENCOUNTER — OFFICE VISIT (OUTPATIENT)
Dept: DERMATOLOGY | Facility: CLINIC | Age: 49
End: 2019-12-31
Payer: COMMERCIAL

## 2019-12-31 DIAGNOSIS — L82.1 SK (SEBORRHEIC KERATOSIS): Primary | ICD-10-CM

## 2019-12-31 DIAGNOSIS — L81.4 LENTIGO: ICD-10-CM

## 2019-12-31 PROCEDURE — 99999 PR PBB SHADOW E&M-EST. PATIENT-LVL II: ICD-10-PCS | Mod: PBBFAC,,, | Performed by: DERMATOLOGY

## 2019-12-31 PROCEDURE — 99202 OFFICE O/P NEW SF 15 MIN: CPT | Mod: S$GLB,,, | Performed by: DERMATOLOGY

## 2019-12-31 PROCEDURE — 99202 PR OFFICE/OUTPT VISIT, NEW, LEVL II, 15-29 MIN: ICD-10-PCS | Mod: S$GLB,,, | Performed by: DERMATOLOGY

## 2019-12-31 PROCEDURE — 99999 PR PBB SHADOW E&M-EST. PATIENT-LVL II: CPT | Mod: PBBFAC,,, | Performed by: DERMATOLOGY

## 2019-12-31 NOTE — PROGRESS NOTES
Subjective:       Patient ID:  Dipti Pacheco is a 49 y.o. female who presents for   Chief Complaint   Patient presents with    Skin Check     mole  L arm, X years , no symptoms, no tx      Patient complains of lesion(s)  Location: left arm  Duration: years  Symptoms: none  Relieving factors/Previous treatments: none    C/o post partum hair loss - 5 month old baby - still breast feeding        Review of Systems   Skin: Positive for daily sunscreen use and activity-related sunscreen use. Negative for recent sunburn.   Hematologic/Lymphatic: Does not bruise/bleed easily.        Objective:    Physical Exam   Constitutional: She appears well-developed and well-nourished. No distress.   Neurological: She is alert and oriented to person, place, and time. She is not disoriented.   Psychiatric: She has a normal mood and affect.   Skin:   Areas Examined (abnormalities noted in diagram):   Scalp / Hair Palpated and Inspected  Head / Face Inspection Performed  LUE Inspection Performed                   Diagram Legend     Erythematous scaling macule/papule c/w actinic keratosis       Vascular papule c/w angioma      Pigmented verrucoid papule/plaque c/w seborrheic keratosis      Yellow umbilicated papule c/w sebaceous hyperplasia      Irregularly shaped tan macule c/w lentigo     1-2 mm smooth white papules consistent with Milia      Movable subcutaneous cyst with punctum c/w epidermal inclusion cyst      Subcutaneous movable cyst c/w pilar cyst      Firm pink to brown papule c/w dermatofibroma      Pedunculated fleshy papule(s) c/w skin tag(s)      Evenly pigmented macule c/w junctional nevus     Mildly variegated pigmented, slightly irregular-bordered macule c/w mildly atypical nevus      Flesh colored to evenly pigmented papule c/w intradermal nevus       Pink pearly papule/plaque c/w basal cell carcinoma      Erythematous hyperkeratotic cursted plaque c/w SCC      Surgical scar with no sign of skin cancer  recurrence      Open and closed comedones      Inflammatory papules and pustules      Verrucoid papule consistent consistent with wart     Erythematous eczematous patches and plaques     Dystrophic onycholytic nail with subungual debris c/w onychomycosis     Umbilicated papule    Erythematous-base heme-crusted tan verrucoid plaque consistent with inflamed seborrheic keratosis     Erythematous Silvery Scaling Plaque c/w Psoriasis     See annotation      Assessment / Plan:        SK (seborrheic keratosis) - left arm and left cheek  These are benign inherited growths without a malignant potential. Reassurance given to patient. No treatment is necessary.       Lentigo  This is a benign hyperpigmented sun induced lesion. Daily sun protection will reduce the number of new lesions. Treatment of these benign lesions are considered cosmetic.  Cont daily sun protection           Follow up if symptoms worsen or fail to improve.

## 2020-01-01 ENCOUNTER — PATIENT MESSAGE (OUTPATIENT)
Dept: ENDOCRINOLOGY | Facility: CLINIC | Age: 50
End: 2020-01-01

## 2020-01-02 ENCOUNTER — TELEPHONE (OUTPATIENT)
Dept: ENDOCRINOLOGY | Facility: CLINIC | Age: 50
End: 2020-01-02

## 2020-01-02 DIAGNOSIS — M85.80 LOW BONE MASS: Primary | ICD-10-CM

## 2020-01-02 NOTE — TELEPHONE ENCOUNTER
Low bone mass   Secondary amenorrhea   S/p IVF now breast feeding     DXAs done at different locations so unable to compare.

## 2020-01-08 ENCOUNTER — PATIENT MESSAGE (OUTPATIENT)
Dept: OPTOMETRY | Facility: CLINIC | Age: 50
End: 2020-01-08

## 2020-01-08 ENCOUNTER — OFFICE VISIT (OUTPATIENT)
Dept: OPTOMETRY | Facility: CLINIC | Age: 50
End: 2020-01-08
Payer: COMMERCIAL

## 2020-01-08 DIAGNOSIS — H00.021 HORDEOLUM INTERNUM OF RIGHT UPPER EYELID: Primary | ICD-10-CM

## 2020-01-08 PROCEDURE — 92012 INTRM OPH EXAM EST PATIENT: CPT | Mod: S$GLB,,, | Performed by: OPTOMETRIST

## 2020-01-08 PROCEDURE — 99999 PR PBB SHADOW E&M-EST. PATIENT-LVL II: ICD-10-PCS | Mod: PBBFAC,,, | Performed by: OPTOMETRIST

## 2020-01-08 PROCEDURE — 92012 PR EYE EXAM, EST PATIENT,INTERMED: ICD-10-PCS | Mod: S$GLB,,, | Performed by: OPTOMETRIST

## 2020-01-08 PROCEDURE — 99999 PR PBB SHADOW E&M-EST. PATIENT-LVL II: CPT | Mod: PBBFAC,,, | Performed by: OPTOMETRIST

## 2020-01-08 RX ORDER — AMOXICILLIN AND CLAVULANATE POTASSIUM 875; 125 MG/1; MG/1
1 TABLET, FILM COATED ORAL 2 TIMES DAILY
Qty: 28 TABLET | Refills: 0 | Status: SHIPPED | OUTPATIENT
Start: 2020-01-08 | End: 2020-01-09

## 2020-01-08 RX ORDER — ERYTHROMYCIN 5 MG/G
OINTMENT OPHTHALMIC NIGHTLY
Qty: 3.5 G | Refills: 0 | Status: SHIPPED | OUTPATIENT
Start: 2020-01-08 | End: 2020-08-30

## 2020-01-08 NOTE — PROGRESS NOTES
HPI     Pt states 2 weeks ago she had a stye appear on upper lid OD very small   and minimal swelling. Slowly started swelling a lot and itching/painful.   Slight discharge. Pt states swelling has gone down some but will not fully   go away. Pt has been using warm compress. Pain scale(4)    Last edited by Riana Carrillo on 1/8/2020  3:54 PM. (History)            Assessment /Plan     For exam results, see Encounter Report.    Hordeolum internum of right upper eyelid     Warm compresses   -     erythromycin (ROMYCIN) ophthalmic ointment; Place into the right eye every evening.  Dispense: 3.5 g; Refill: 0  -     amoxicillin-clavulanate 875-125mg (AUGMENTIN) 875-125 mg per tablet; Take 1 tablet by mouth 2 (two) times daily. for 14 days  Dispense: 28 tablet; Refill: 0     Consult for excision if no improvement    RTC PRN/ annual

## 2020-01-09 RX ORDER — AMOXICILLIN AND CLAVULANATE POTASSIUM 500; 125 MG/1; MG/1
1 TABLET, FILM COATED ORAL 2 TIMES DAILY
Qty: 28 TABLET | Refills: 0 | Status: SHIPPED | OUTPATIENT
Start: 2020-01-09 | End: 2020-01-23

## 2020-01-11 ENCOUNTER — PATIENT MESSAGE (OUTPATIENT)
Dept: OBSTETRICS AND GYNECOLOGY | Facility: CLINIC | Age: 50
End: 2020-01-11

## 2020-01-21 ENCOUNTER — PATIENT MESSAGE (OUTPATIENT)
Dept: OPTOMETRY | Facility: CLINIC | Age: 50
End: 2020-01-21

## 2020-01-21 ENCOUNTER — TELEPHONE (OUTPATIENT)
Dept: OPHTHALMOLOGY | Facility: CLINIC | Age: 50
End: 2020-01-21

## 2020-01-21 NOTE — TELEPHONE ENCOUNTER
----- Message from Riana Carrillo sent at 1/21/2020 10:16 AM CST -----  Please call pt to schedule chalazion removal. Thank you.

## 2020-01-23 ENCOUNTER — OFFICE VISIT (OUTPATIENT)
Dept: UROGYNECOLOGY | Facility: CLINIC | Age: 50
End: 2020-01-23
Payer: COMMERCIAL

## 2020-01-23 VITALS
DIASTOLIC BLOOD PRESSURE: 60 MMHG | SYSTOLIC BLOOD PRESSURE: 88 MMHG | BODY MASS INDEX: 22.66 KG/M2 | WEIGHT: 149.5 LBS | HEIGHT: 68 IN

## 2020-01-23 DIAGNOSIS — N81.6 POSTERIOR VAGINAL WALL PROLAPSE: Primary | ICD-10-CM

## 2020-01-23 DIAGNOSIS — K64.9 HEMORRHOIDS, UNSPECIFIED HEMORRHOID TYPE: ICD-10-CM

## 2020-01-23 DIAGNOSIS — N39.46 MIXED STRESS AND URGE URINARY INCONTINENCE: ICD-10-CM

## 2020-01-23 PROCEDURE — 99213 OFFICE O/P EST LOW 20 MIN: CPT | Mod: S$GLB,,, | Performed by: OBSTETRICS & GYNECOLOGY

## 2020-01-23 PROCEDURE — 99999 PR PBB SHADOW E&M-EST. PATIENT-LVL IV: ICD-10-PCS | Mod: PBBFAC,,, | Performed by: OBSTETRICS & GYNECOLOGY

## 2020-01-23 PROCEDURE — 3008F BODY MASS INDEX DOCD: CPT | Mod: CPTII,S$GLB,, | Performed by: OBSTETRICS & GYNECOLOGY

## 2020-01-23 PROCEDURE — 3008F PR BODY MASS INDEX (BMI) DOCUMENTED: ICD-10-PCS | Mod: CPTII,S$GLB,, | Performed by: OBSTETRICS & GYNECOLOGY

## 2020-01-23 PROCEDURE — 99999 PR PBB SHADOW E&M-EST. PATIENT-LVL IV: CPT | Mod: PBBFAC,,, | Performed by: OBSTETRICS & GYNECOLOGY

## 2020-01-23 PROCEDURE — 99213 PR OFFICE/OUTPT VISIT, EST, LEVL III, 20-29 MIN: ICD-10-PCS | Mod: S$GLB,,, | Performed by: OBSTETRICS & GYNECOLOGY

## 2020-01-23 NOTE — PATIENT INSTRUCTIONS
1. Fecal incontinence  --Fiber may better control cholesterol and blood glucose.  Start daily fiber.  Take 1 tsp of fiber powder (psyllium or other sugar-free powder).  Mix in 8 oz of water.  Take x 3-5 days.  Then, increase fiber by 1 tsp every 3-5 days until stool is easy to pass.  ( Metamucil, benefiber )   --Keep a bowel diary   -- consider Lomotil in the future  --Pelvic floor PT  --Endoanal sonogram to evaluate the sphincter   --Anal manometry   --We discussed treatment option including Pelvic floor PT, SNS, and Sphincteroplasty. Risks, benefits reviewed in detail. For now we will start with pelvic floor PT.   --Consider Colorectal evaluation in the future    2.  Mixed urinary incontinence, urge > stress:   --Bladder diary for 3-7 days, write the number of times you go to the rest room and associated symptoms of urgency or leakage.   --Empty bladder every 3 hours.  Empty well: wait a minute, lean forward on toilet.    --Avoid dietary irritants (see sheet).  Keep diary x 3-5 days to determine your irritants.  --KEGELS: do 10 in AM and 10 in PM, holding each x 10 seconds.  When you feel urge to go, STOP, KEGEL, and when urge has passed, then go to bathroom.  Consider PT in future.    --URGE: Ditropan 10 mg daily or Myrbetriq 50 mg daily.  SE profile reviewed.   Takes 2-4 weeks to see if will have effect.  For dry mouth: get sour, sugar free lozenge or gum. Currently breast feeding.   --STRESS:  Pessary vs. Sling vs impressa

## 2020-01-23 NOTE — PROGRESS NOTES
Subjective:       Patient ID: Dipti Pacheco is a 49 y.o. female.    Chief Complaint:  Vaginal Prolapse (follow up)      History of Present Illness  HPI 49 Y O F P1   has a past medical history of Allergy, Anxiety, Disorder of muscle (2019), Iron deficiency anemia secondary to inadequate dietary iron intake (2019), and Subclinical hypothyroidism. referred by NP Kristin Wallace for pelvic organ prolapse and fecal incompetence. She began in after She began Kegel exercise early on but stopped and has now restarted. She had a induced due to non fetal heart tracing which lead to an pulido balloon induction of labor at 37 wks IVF pregnancy. She had a  approximately 38 hours in labor no forceps no vacuum. Immediately after the delivery there was very little sensation of the pelvis. She denies previous episodes of fecal incontinence, since the delivery it ranges from daily to every others day. The amount varies from a small amount to large amounts especially more bothersome when it's more loose.      Here for follow up: has noted improvement in the pelvic pressure since using the pessary.  Was recently seen by [facility] for evaluation of worsening hemorrhoids. She continues to have some issues with the hemorrhoids.         Ohs Peq Urogyn Hpi     Question 2019  9:25 PM CDT - Filed by Patient on 2019   General Urogynecology: Are you experiencing the following?    Dysuria (painful urination) No   Nocturia:  waking up at night to empty your bladder  Yes   If you answered yes to the previous question, how many times does this happen per night? 3-4   Enuresis (urine loss during sleep) No   Dribbling urine after you urinate No   Hematuria (urine appears red) No   Type of stream Weak   Urinary frequency: How often a day are you going to the bathroom per day?  10-15   Urinary Tract Infections: How many Urinary Tract Infections have you had in the past year? I have not had a UTI in the past year   If you  "have had a UTI in the past year, what treatments have you had so far?  I have not had a UTI in the past year   Urinary Incontinence (General): Are you experiencing the following?    Past consultation for incontinence: Have you ever seen someone for the evaluation of incontinence? No   If you answered yes to the previous question, please select all the therapies you have tried.  N/a- I answered no to the previous question   Please note the effectiveness of the therapies.    Need to wear protection to keep clothes dry  Yes   If you answered yes to the previous question, please basilia the protection you use.  Panty liner   If you wear protection, how much wetness is typically on each pad? Slight   If you wear protection, how often do you have to change per day, if applicable?  2   Stress Symptoms: Are you experiencing the following?    Leakage of urine with cough, laugh and/or sneeze No   If you answered yes to the previous question, what is the frequency in days, weeks and/or months? Never   Leakage of urine with sex No   Leakage of urine with bending/ lifting No   Leakage of urine with briskly walking or jogging No   If you lose urine for any other reason not previously mentioned, please note it below, if applicable.  n/a   Urge Symptoms: Are you experiencing the following?    Urgency ("got to go" feeling) Yes   Urge: How frequently do you feel an urge to urinate (feeling like you "gotta go" to the bathroom and can't wait) Several times a day   Do you experience a leakage of urine when you have a feeling of urgency?  Yes   Leakage of urine when unaware No   Past use of anticholinergics (medications used to treat overactive bladder) No   If you answered yes to the previous question, please basilia the anticholinergics you have used:     Have you ever used Mirbetriq (aka Mirabegron)?  No   Prolapse Symptoms: Are you experiencing any of the following?     Falling out/ Bulging/ Heaviness in the vagina No   Vaginal/ Abdominal " Pain/ Pressure No   Need to strain/ Push to void No   Need to wait on the toilet before you void No   Unusual position to urinate (using your hands to push back the vaginal bulge) No   Sensation of incomplete emptying Yes   Past use of pessary device No   If you answered yes to the previous question, please list the devices you have used below.  n/a   Bowel Symptoms: Are you experiencing any of the following?    Constipation No   Diarrhea  No   Hematochezia (bloody stool) No   Incomplete evacuation of stool No   Involuntary loss of formed stool No   Fecal smearing/urgency Yes   Involuntary loss of gas Yes   Vaginal Symptoms: Are you experiencing any of the following?     Abnormal vaginal bleeding  No   Vaginal dryness No   Sexually active  No   Dyspareunia (painful intercourse) No   Estrogen use  No       GYN & OB History  No LMP recorded.   Date of Last Pap: No result found    OB History    Para Term  AB Living   1 1 1     1   SAB TAB Ectopic Multiple Live Births         0 1      # Outcome Date GA Lbr Anurag/2nd Weight Sex Delivery Anes PTL Lv   1 Term 19 37w3d  2.8 kg (6 lb 2.8 oz) M Vag-Spont EPI N GILLIAN      Complications: Chorioamnionitis     OB  Largest: 6#  Forceps:  Episiotomy: 2nd degree    GYN  Menarche: 15  Menstrual cycle: nomral  Menopause: amenorrhea prior to pregnancy   Hysterectomy: no  Ovaries: present       Review of Systems  Review of Systems   Constitutional: Negative for activity change, appetite change, chills, diaphoresis, fatigue, fever and unexpected weight change.   Respiratory: Negative for cough.    Gastrointestinal: Negative for abdominal distention, abdominal pain, anal bleeding, blood in stool, constipation, diarrhea, nausea, rectal pain and vomiting.   Genitourinary: Negative for decreased urine volume, difficulty urinating, dyspareunia, dysuria, enuresis, flank pain, frequency, genital sores, hematuria, menstrual problem, pelvic pain, urgency, vaginal bleeding, vaginal  discharge and vaginal pain.   Musculoskeletal: Negative for back pain.   Skin: Negative for color change, pallor, rash and wound.   Allergic/Immunologic: Negative for environmental allergies, food allergies and immunocompromised state.   Hematological: Negative for adenopathy. Does not bruise/bleed easily.   Psychiatric/Behavioral: Negative for agitation, behavioral problems, confusion and sleep disturbance.           Objective:     Physical Exam   Constitutional: She is oriented to person, place, and time. She appears well-developed.   HENT:   Head: Normocephalic and atraumatic.   Eyes: Conjunctivae and EOM are normal.   Neck: Normal range of motion. Neck supple.   Cardiovascular: Normal rate, regular rhythm, S1 normal, S2 normal, normal heart sounds and intact distal pulses.   Pulmonary/Chest: Effort normal and breath sounds normal. She exhibits no tenderness.   Abdominal: Soft. Bowel sounds are normal. She exhibits no distension and no mass. There is no hepatosplenomegaly, splenomegaly or hepatomegaly. There is no tenderness. There is no rigidity, no rebound, no guarding and no CVA tenderness. No hernia.   Genitourinary: Pelvic exam was performed with patient supine. Vagina normal, uterus normal, cervix normal, skenes normal and bartholins normal. Right labia normal and left labia normal. Urethra exhibits no urethral caruncle, no urethral diverticulum, no urethral mass and no hypermobility. Right bartholin is not enlarged and not tender. Left bartholin is not enlarged and not tender. Rectal exam shows external hemorrhoid, resting tone normal and active tone normal. Rectal exam shows no fissure, no tenderness, anal tone normal and no dovetailing. Guaiac negative stool. There is a cystocele and unspecified prolapse of vaginal walls in the vagina. No foreign body, tenderness, bleeding, fistula, mesh exposure or lavator tenderness in the vagina. No vaginal discharge found. Right adnexum displays no mass and no  tenderness. Left adnexum displays no mass and no tenderness. Cervix exhibits no motion tenderness and no discharge. Uterus is not tender and not experiencing uterine prolapse.   PVR: 90 ML  Empty cough stress test: Negative.  Kegel: 2/5    POP-Q  Aa: -1 Ba: -1 C: -4   GH: 3 PB: 2 TVL: 8   Ap: -1 Bp: -1 D: -6                     Musculoskeletal: Normal range of motion.   Lymphadenopathy:     She has no axillary adenopathy.   Neurological: She is alert and oriented to person, place, and time. She has normal strength and normal reflexes. Cranial nerves II through XII intact. No cranial nerve deficit.   Skin: Skin is warm, dry and intact.   Psychiatric: She has a normal mood and affect. Her speech is normal and behavior is normal. Judgment normal. Cognition and memory are normal.             HCM  Pap's: normal   Mammo: normal 2018  Colonoscopy n/a  Dexa: n/a    Assessment:        1. Posterior vaginal wall prolapse    2. Hemorrhoids, unspecified hemorrhoid type    3. Mixed stress and urge urinary incontinence             Plan:           1. Fecal incontinence  --Fiber may better control cholesterol and blood glucose.  Start daily fiber.  Take 1 tsp of fiber powder (psyllium or other sugar-free powder).  Mix in 8 oz of water.  Take x 3-5 days.  Then, increase fiber by 1 tsp every 3-5 days until stool is easy to pass.  ( Metamucil, benefiber )   --Keep a bowel diary   -- consider Lomotil in the future  --Pelvic floor PT  --Endoanal sonogram to evaluate the sphincter   --Anal manometry   --We discussed treatment option including Pelvic floor PT, SNS, and Sphincteroplasty. Risks, benefits reviewed in detail. For now we will start with pelvic floor PT.   --Consider Colorectal evaluation in the future    2.  Mixed urinary incontinence, urge > stress:   --Bladder diary for 3-7 days, write the number of times you go to the rest room and associated symptoms of urgency or leakage.   --Empty bladder every 3 hours.  Empty well: wait  a minute, lean forward on toilet.    --Avoid dietary irritants (see sheet).  Keep diary x 3-5 days to determine your irritants.  --KEGELS: do 10 in AM and 10 in PM, holding each x 10 seconds.  When you feel urge to go, STOP, KEGEL, and when urge has passed, then go to bathroom.  Consider PT in future.    --URGE: Ditropan 10 mg daily or Myrbetriq 50 mg daily.  SE profile reviewed.   Takes 2-4 weeks to see if will have effect.  For dry mouth: get sour, sugar free lozenge or gum. Currently breast feeding.   --STRESS:  Pessary vs. Sling vs impressa       Approximately 30 min were spent in consult, 90 % in discussion.     Laury Casillas DO  Female Pelvic Medicine and Reconstructive Surgery  [facility] [place], [place]

## 2020-04-07 ENCOUNTER — TELEPHONE (OUTPATIENT)
Dept: UROGYNECOLOGY | Facility: CLINIC | Age: 50
End: 2020-04-07

## 2020-04-07 NOTE — TELEPHONE ENCOUNTER
----- Message from Serge Alfonso MA sent at 4/7/2020  2:25 PM CDT -----  The pt is requesting a phone call regarding her scheduled appointment for 5/12.  The pt can be reached at [phone].

## 2020-04-07 NOTE — TELEPHONE ENCOUNTER
Spoke to pt. And offered to r/s her upcoming appt. To VV, pt. Stated she will require a pelvic exam and would not benefit from a VV.Pt. Stated she will message  before canceling or rescheduling appt.

## 2020-04-08 ENCOUNTER — PATIENT MESSAGE (OUTPATIENT)
Dept: UROGYNECOLOGY | Facility: CLINIC | Age: 50
End: 2020-04-08

## 2020-04-09 NOTE — TELEPHONE ENCOUNTER
Please advise,    Hi Dr. Casillas,    I hope you and your family are staying well in these challenging times.   I reach out about our upcoming appointment on May 12. I tried to reschedule it for later that same week because an important work meeting came up but you don't have any openings until a month later. I'm not sure I should wait that long because the prolapse has gotten worse in the last 3 weeks, to the point that I can feel and see an organ (not sure which) from the back of the vagina, even with the pessary in. I just moved and carried a lot, which might have aggravated it.   Anyway, let me know what you think in terms of when would be best to see me. If May is best, I'll try again to reschedule again. However, I would need to come with my 9-month old son. I'm not sure where we'll be in terms of risk by May. Let me know your thoughts.     Thanks and warm wishes,  Dipti

## 2020-04-15 ENCOUNTER — TELEPHONE (OUTPATIENT)
Dept: UROGYNECOLOGY | Facility: CLINIC | Age: 50
End: 2020-04-15

## 2020-04-15 NOTE — TELEPHONE ENCOUNTER
----- Message from Dileep Cristina sent at 4/15/2020  2:44 PM CDT -----  Pt was returning call         Pt call back [phone]

## 2020-04-15 NOTE — TELEPHONE ENCOUNTER
Spoke to pt, VV scheduled 4/20/2020 at 11a with Dr. Casillas. Pt is aware and verbalizes understanding.

## 2020-04-20 ENCOUNTER — OFFICE VISIT (OUTPATIENT)
Dept: UROGYNECOLOGY | Facility: CLINIC | Age: 50
End: 2020-04-20
Payer: COMMERCIAL

## 2020-04-20 DIAGNOSIS — R15.2 INCONTINENCE OF FECES WITH FECAL URGENCY: ICD-10-CM

## 2020-04-20 DIAGNOSIS — R15.9 INCONTINENCE OF FECES WITH FECAL URGENCY: ICD-10-CM

## 2020-04-20 DIAGNOSIS — R15.1 FECAL SMEARING: Primary | ICD-10-CM

## 2020-04-20 PROCEDURE — 99214 PR OFFICE/OUTPT VISIT, EST, LEVL IV, 30-39 MIN: ICD-10-PCS | Mod: 95,,, | Performed by: OBSTETRICS & GYNECOLOGY

## 2020-04-20 PROCEDURE — 99214 OFFICE O/P EST MOD 30 MIN: CPT | Mod: 95,,, | Performed by: OBSTETRICS & GYNECOLOGY

## 2020-04-20 NOTE — PROGRESS NOTES
Subjective:       Patient ID: Dipti Pacheco is a 49 y.o. female.    Chief Complaint:  PELVIC PRESSURE    The patient location is: [place], home   The chief complaint leading to consultation is: worsening pelvic pressure and bulge.   Visit type: audiovisual  Total time spent with patient: 33 min  Each patient to whom he or she provides medical services by telemedicine is:  (1) informed of the relationship between the physician and patient and the respective role of any other health care provider with respect to management of the patient; and (2) notified that he or she may decline to receive medical services by telemedicine and may withdraw from such care at any time.    Notes: see below       History of Present Illness  HPI 49 Y O F P1   has a past medical history of Allergy, Anxiety, Disorder of muscle (2019), Iron deficiency anemia secondary to inadequate dietary iron intake (2019), and Subclinical hypothyroidism. referred by NP Kristin Wallace for pelvic organ prolapse and fecal incompetence. She began in after She began Kegel exercise early on but stopped and has now restarted. She had a induced due to non fetal heart tracing which lead to an pulido balloon induction of labor at 37 wks IVF pregnancy. She had a  approximately 38 hours in labor no forceps no vacuum. Immediately after the delivery there was very little sensation of the pelvis. She denies previous episodes of fecal incontinence, since the delivery it ranges from daily to every others day. The amount varies from a small amount to large amounts especially more bothersome when it's more loose.      Interval follow up: recently moved     [facility] since the last visit   1)  UI:  (--) FLAQUITO  (+) UUI  - at night if bladder too full.  (--) pads:.  Daytime frequency: Q 4 -5 hours.  Nocturia: No:    (-) dysuria-  (--) hematuria,  (--) frequent UTIs.  (+) complete bladder emptying.     2)  POP:   Symptoms:(+)  .  (--) vaginal bleeding. (--)  vaginal discharge. (-) sexually active.  (--) dyspareunia.   (--)  Vaginal dryness.  (--) vaginal estrogen use.     3)  BM:  (+) constipation/straining.  (--) chronic diarrhea. (--) hematochezia.  (--) fecal incontinence.  (--) fecal smearing/urgency.  (--) incomplete evacuation.      4)Pessary- denies pain,bleeding or current discharge. Using #3 ring with support able to remove and place on a bimonthly basis       Ohs Peq Urogyn Hpi     Question 8/21/2019  9:25 PM CDT - Filed by Patient on 8/21/2019   General Urogynecology: Are you experiencing the following?    Dysuria (painful urination) No   Nocturia:  waking up at night to empty your bladder  Yes   If you answered yes to the previous question, how many times does this happen per night? 3-4   Enuresis (urine loss during sleep) No   Dribbling urine after you urinate No   Hematuria (urine appears red) No   Type of stream Weak   Urinary frequency: How often a day are you going to the bathroom per day?  10-15   Urinary Tract Infections: How many Urinary Tract Infections have you had in the past year? I have not had a UTI in the past year   If you have had a UTI in the past year, what treatments have you had so far?  I have not had a UTI in the past year   Urinary Incontinence (General): Are you experiencing the following?    Past consultation for incontinence: Have you ever seen someone for the evaluation of incontinence? No   If you answered yes to the previous question, please select all the therapies you have tried.  N/a- I answered no to the previous question   Please note the effectiveness of the therapies.    Need to wear protection to keep clothes dry  Yes   If you answered yes to the previous question, please basilia the protection you use.  Panty liner   If you wear protection, how much wetness is typically on each pad? Slight   If you wear protection, how often do you have to change per day, if applicable?  2   Stress Symptoms: Are you experiencing the  "following?    Leakage of urine with cough, laugh and/or sneeze No   If you answered yes to the previous question, what is the frequency in days, weeks and/or months? Never   Leakage of urine with sex No   Leakage of urine with bending/ lifting No   Leakage of urine with briskly walking or jogging No   If you lose urine for any other reason not previously mentioned, please note it below, if applicable.  n/a   Urge Symptoms: Are you experiencing the following?    Urgency ("got to go" feeling) Yes   Urge: How frequently do you feel an urge to urinate (feeling like you "gotta go" to the bathroom and can't wait) Several times a day   Do you experience a leakage of urine when you have a feeling of urgency?  Yes   Leakage of urine when unaware No   Past use of anticholinergics (medications used to treat overactive bladder) No   If you answered yes to the previous question, please basilia the anticholinergics you have used:     Have you ever used Mirbetriq (aka Mirabegron)?  No   Prolapse Symptoms: Are you experiencing any of the following?     Falling out/ Bulging/ Heaviness in the vagina No   Vaginal/ Abdominal Pain/ Pressure No   Need to strain/ Push to void No   Need to wait on the toilet before you void No   Unusual position to urinate (using your hands to push back the vaginal bulge) No   Sensation of incomplete emptying Yes   Past use of pessary device No   If you answered yes to the previous question, please list the devices you have used below.  n/a   Bowel Symptoms: Are you experiencing any of the following?    Constipation No   Diarrhea  No   Hematochezia (bloody stool) No   Incomplete evacuation of stool No   Involuntary loss of formed stool No   Fecal smearing/urgency Yes   Involuntary loss of gas Yes   Vaginal Symptoms: Are you experiencing any of the following?     Abnormal vaginal bleeding  No   Vaginal dryness No   Sexually active  No   Dyspareunia (painful intercourse) No   Estrogen use  No       GYN & OB " History  No LMP recorded.   Date of Last Pap: No result found    OB History    Para Term  AB Living   1 1 1     1   SAB TAB Ectopic Multiple Live Births         0 1      # Outcome Date GA Lbr Anurag/2nd Weight Sex Delivery Anes PTL Lv   1 Term 19 37w3d  2.8 kg (6 lb 2.8 oz) M Vag-Spont EPI N GILLIAN      Complications: Chorioamnionitis     OB  Largest: 6#  Forceps:  Episiotomy: 2nd degree    GYN  Menarche: 15  Menstrual cycle: nomral  Menopause: amenorrhea prior to pregnancy   Hysterectomy: no  Ovaries: present       Review of Systems  Review of Systems   Constitutional: Negative for activity change, appetite change, chills, diaphoresis, fatigue, fever and unexpected weight change.   Respiratory: Negative for cough.    Gastrointestinal: Negative for abdominal distention, abdominal pain, anal bleeding, blood in stool, constipation, diarrhea, nausea, rectal pain and vomiting.   Genitourinary: Negative for decreased urine volume, difficulty urinating, dyspareunia, dysuria, enuresis, flank pain, frequency, genital sores, hematuria, menstrual problem, pelvic pain, urgency, vaginal bleeding, vaginal discharge and vaginal pain.   Musculoskeletal: Negative for back pain.   Skin: Negative for color change, pallor, rash and wound.   Allergic/Immunologic: Negative for environmental allergies, food allergies and immunocompromised state.   Hematological: Negative for adenopathy. Does not bruise/bleed easily.   Psychiatric/Behavioral: Negative for agitation, behavioral problems, confusion and sleep disturbance.           Objective:     Physical Exam   Constitutional: She is oriented to person, place, and time. She appears well-developed and well-nourished.   HENT:   Head: Normocephalic and atraumatic.   Neck: Normal range of motion. Neck supple.   Pulmonary/Chest: No respiratory distress. She has no wheezes.   Musculoskeletal: Normal range of motion.   Neurological: She is alert and oriented to person, place, and  time.   Skin: No cyanosis. Nails show no clubbing.   Psychiatric: She has a normal mood and affect. Her behavior is normal. Judgment and thought content normal.             HCM  Pap's: normal   Mammo: normal 2018  Colonoscopy more than 10 years, normal   Dexa: n/a    Assessment:        1. Fecal smearing    2. Incontinence of feces with fecal urgency             Plan:           1. Fecal incontinence/ incomplete bowel emptying likely secondary to outlet obstruction given her recel  --Fiber may better control cholesterol and blood glucose.  Start daily fiber.  Take 1 tsp of fiber powder (psyllium or other sugar-free powder).  Mix in 8 oz of water.  Take x 3-5 days.  Then, increase fiber by 1 tsp every 3-5 days until stool is easy to pass.  ( Metamucil, benefiber )   --Keep a bowel diary   -- consider Lomotil in the future  --Pelvic floor PT  --Endoanal sonogram to evaluate the sphincter   --Anal manometry   --We discussed treatment option including Pelvic floor PT, SNS, and Sphincteroplasty. Risks, benefits reviewed in detail. She has worked with [facility], with mild improvement, she has recently started working with [employer] at [employer]   -- Colorectal evaluation     2.  Mixed urinary incontinence, urge > stress: now urge incontinence more bothersome    --Empty bladder every 3 hours.  Empty well: wait a minute, lean forward on toilet.    --Avoid dietary irritants (see sheet).  Keep diary x 3-5 days to determine your irritants.  --KEGELS: do 10 in AM and 10 in PM, holding each x 10 seconds.  When you feel urge to go, STOP, KEGEL, and when urge has passed, then go to bathroom.  Consider PT in future.    --URGE: Ditropan 10 mg daily or Myrbetriq 50 mg daily.  SE profile reviewed.   Takes 2-4 weeks to see if will have effect.  For dry mouth: get sour, sugar free lozenge or gum. Currently breast feeding.   --STRESS:  Pessary vs. Sling vs impressa     3. Prolapse: Stage 2 anterior and posterior vaginal wall prolapse from  prior visit. Likely worsening.   --will send in secure pictures demonstrating the bulge at its worst.   --Pessary in placed however she continues to have difficulty, likely will need a larger sized pessary  --Daily pelvic floor exercises as assigned,  --Non surgical options discussed with patient    --Surgical options also discussed, she's more interested in the non-surgical treatment options for now.     Approximately 33 min were spent in consult, 90 % in discussion.     Laury Casillas DO  Female Pelvic Medicine and Reconstructive Surgery  [facility] [place], [place]

## 2020-04-28 ENCOUNTER — PATIENT MESSAGE (OUTPATIENT)
Dept: UROGYNECOLOGY | Facility: CLINIC | Age: 50
End: 2020-04-28

## 2020-04-28 NOTE — PATIENT INSTRUCTIONS
SEBORRHEIC KERATOSES        What causes seborrheic keratoses?    Seborrheic keratoses are harmless, common skin growths that first appear during adult life.  As time goes by, more growths appear.  Some persons have a very large number of them.  Seborrheic keratoses appear on both covered and uncovered parts of the body; they are not caused by sunlight.  The tendency to develop seborrheic keratoses is inherited.    Seborrheic keratoses are harmless and never become malignant.  They begin as slightly raised, light brown spots.  Gradually they thicken and take on a rough wartlike surface.  They slowly darken and may turn black.  These color changes are harmless.  Seborrheic keratoses are superficial and look as if they were stuck on the skin.  Persons who have had several seborrheic keratoses can usually recognize this type of benign growth.  However, if you are concerned or unsure about any growth, consult me.    Treatment    Seborrheic keratoses can easily be removed in the office.  The only reason for removing a seborrheic keratosis is your wish to get rid of it.        
60 yo woman with  COVID +  Viral pneumonia  Hypoxia,MORRISON  elevated Inflammatory markers    A) Covid  yoko with entte pneumonia and hypoxia due to it  elevated inflammatory markers  Monitor oxygenation  No proven treatment  Discussed ongoing clinical trials and options  enrollment in remdesivir  inhibitor trial discussed  Patient agreeable  enrolled after informed consnet  will be started on remdesivir  No investigational agent while on it  Max 10 days though final duration depending on response  Monitor CBC CMP    B) Pneumonia  Likley viral Monitor for s/s any superimposed bacterial process  Plan as above    C) fever, elevated markers  Plan as above    Will tailor plan for ID issues  per course,results.Will defer to primary team on management of other issues.  Assessment, plan and recommendations as detailed above were discussed with the medical/primary  team.  Will Follow.  Beeper [id number] [facility] [id number].   Wknd/afterhours/No response-[phone] or Fellow on call

## 2020-04-28 NOTE — TELEPHONE ENCOUNTER
Please advise,    Hi Dr. Casillas,    Attached are photos of the organs with and without pessary in. I haven't had the mental strength to remove the pessary for 24 hours because my organs descended so much the last time I did, and now they're already descended and it's been weighing on me heavy, literally since I feel the bulge constantly, and emotionally. So I just took the pessary out to take the photo and then put it back in. I'm doing kegels and feeling some improvement in the sensations of the pelvic floor.   I still haven't been contacted for an appointment but I suppose you all were waiting for the official word on the stay at home order and medical procedures allowed. Keep me posted.     Thanks and stay well,  Dipti

## 2020-04-30 ENCOUNTER — PATIENT MESSAGE (OUTPATIENT)
Dept: UROGYNECOLOGY | Facility: CLINIC | Age: 50
End: 2020-04-30

## 2020-05-11 ENCOUNTER — PATIENT MESSAGE (OUTPATIENT)
Dept: UROGYNECOLOGY | Facility: CLINIC | Age: 50
End: 2020-05-11

## 2020-05-19 ENCOUNTER — OFFICE VISIT (OUTPATIENT)
Dept: UROGYNECOLOGY | Facility: CLINIC | Age: 50
End: 2020-05-19
Payer: COMMERCIAL

## 2020-05-19 VITALS
WEIGHT: 146.63 LBS | BODY MASS INDEX: 22.22 KG/M2 | DIASTOLIC BLOOD PRESSURE: 56 MMHG | HEIGHT: 68 IN | SYSTOLIC BLOOD PRESSURE: 94 MMHG

## 2020-05-19 DIAGNOSIS — N81.10 PROLAPSE OF ANTERIOR VAGINAL WALL: ICD-10-CM

## 2020-05-19 DIAGNOSIS — N81.6 POSTERIOR VAGINAL WALL PROLAPSE: ICD-10-CM

## 2020-05-19 DIAGNOSIS — Z46.89 PESSARY MAINTENANCE: ICD-10-CM

## 2020-05-19 DIAGNOSIS — Z11.51 SCREENING FOR HUMAN PAPILLOMAVIRUS (HPV): Primary | ICD-10-CM

## 2020-05-19 PROCEDURE — 99999 PR PBB SHADOW E&M-EST. PATIENT-LVL III: ICD-10-PCS | Mod: PBBFAC,,, | Performed by: OBSTETRICS & GYNECOLOGY

## 2020-05-19 PROCEDURE — 99213 OFFICE O/P EST LOW 20 MIN: CPT | Mod: S$GLB,,, | Performed by: OBSTETRICS & GYNECOLOGY

## 2020-05-19 PROCEDURE — 3008F PR BODY MASS INDEX (BMI) DOCUMENTED: ICD-10-PCS | Mod: CPTII,S$GLB,, | Performed by: OBSTETRICS & GYNECOLOGY

## 2020-05-19 PROCEDURE — 3008F BODY MASS INDEX DOCD: CPT | Mod: CPTII,S$GLB,, | Performed by: OBSTETRICS & GYNECOLOGY

## 2020-05-19 PROCEDURE — 87624 HPV HI-RISK TYP POOLED RSLT: CPT

## 2020-05-19 PROCEDURE — 88175 CYTOPATH C/V AUTO FLUID REDO: CPT

## 2020-05-19 PROCEDURE — 99999 PR PBB SHADOW E&M-EST. PATIENT-LVL III: CPT | Mod: PBBFAC,,, | Performed by: OBSTETRICS & GYNECOLOGY

## 2020-05-19 PROCEDURE — 99213 PR OFFICE/OUTPT VISIT, EST, LEVL III, 20-29 MIN: ICD-10-PCS | Mod: S$GLB,,, | Performed by: OBSTETRICS & GYNECOLOGY

## 2020-05-19 NOTE — PROGRESS NOTES
Subjective:       Patient ID: Dipti Pacheco is a 49 y.o. female.    Chief Complaint:  Vaginal Prolapse and Urinary Incontinence        History of Present Illness  HPI 49 Y O F P1   has a past medical history of Allergy, Anxiety, Disorder of muscle (2019), Iron deficiency anemia secondary to inadequate dietary iron intake (2019), and Subclinical hypothyroidism. referred by NP Kristin Wallace for pelvic organ prolapse and fecal incompetence. She began in after She began Kegel exercise early on but stopped and has now restarted. She had a induced due to non fetal heart tracing which lead to an pulido balloon induction of labor at 37 wks IVF pregnancy. She had a  approximately 38 hours in labor no forceps no vacuum. Immediately after the delivery there was very little sensation of the pelvis. She denies previous episodes of fecal incontinence, since the delivery it ranges from daily to every others day. The amount varies from a small amount to large amounts especially more bothersome when it's more loose.      Interval follow up: recently moved     [facility] since the last visit   1)  UI:  (--) FLAQUITO  (+) UUI  - at night if bladder too full.  (--) pads:.  Daytime frequency: Q 4 -5 hours.  Nocturia: No:    (-) dysuria-  (--) hematuria,  (--) frequent UTIs.  (+) complete bladder emptying.     2)  POP:   Symptoms:(+)  .  (--) vaginal bleeding. (--) vaginal discharge. (-) sexually active.  (--) dyspareunia.   (--)  Vaginal dryness.  (--) vaginal estrogen use.     3)  BM:  (+) constipation/straining.  (--) chronic diarrhea. (--) hematochezia.  (--) fecal incontinence.  (--) fecal smearing/urgency.  (--) incomplete evacuation.      4)Pessary- denies pain,bleeding or current discharge. Using #5 ring with support able to remove and place on a bimonthly basis       [facility] Urogyn Hpi     Question 2019  9:25 PM CDT - Filed by Patient on 2019   General Urogynecology: Are you experiencing the  "following?    Dysuria (painful urination) No   Nocturia:  waking up at night to empty your bladder  Yes   If you answered yes to the previous question, how many times does this happen per night? 3-4   Enuresis (urine loss during sleep) No   Dribbling urine after you urinate No   Hematuria (urine appears red) No   Type of stream Weak   Urinary frequency: How often a day are you going to the bathroom per day?  10-15   Urinary Tract Infections: How many Urinary Tract Infections have you had in the past year? I have not had a UTI in the past year   If you have had a UTI in the past year, what treatments have you had so far?  I have not had a UTI in the past year   Urinary Incontinence (General): Are you experiencing the following?    Past consultation for incontinence: Have you ever seen someone for the evaluation of incontinence? No   If you answered yes to the previous question, please select all the therapies you have tried.  N/a- I answered no to the previous question   Please note the effectiveness of the therapies.    Need to wear protection to keep clothes dry  Yes   If you answered yes to the previous question, please basilia the protection you use.  Panty liner   If you wear protection, how much wetness is typically on each pad? Slight   If you wear protection, how often do you have to change per day, if applicable?  2   Stress Symptoms: Are you experiencing the following?    Leakage of urine with cough, laugh and/or sneeze No   If you answered yes to the previous question, what is the frequency in days, weeks and/or months? Never   Leakage of urine with sex No   Leakage of urine with bending/ lifting No   Leakage of urine with briskly walking or jogging No   If you lose urine for any other reason not previously mentioned, please note it below, if applicable.  n/a   Urge Symptoms: Are you experiencing the following?    Urgency ("got to go" feeling) Yes   Urge: How frequently do you feel an urge to urinate (feeling " "like you "gotta go" to the bathroom and can't wait) Several times a day   Do you experience a leakage of urine when you have a feeling of urgency?  Yes   Leakage of urine when unaware No   Past use of anticholinergics (medications used to treat overactive bladder) No   If you answered yes to the previous question, please basilia the anticholinergics you have used:     Have you ever used Mirbetriq (aka Mirabegron)?  No   Prolapse Symptoms: Are you experiencing any of the following?     Falling out/ Bulging/ Heaviness in the vagina No   Vaginal/ Abdominal Pain/ Pressure No   Need to strain/ Push to void No   Need to wait on the toilet before you void No   Unusual position to urinate (using your hands to push back the vaginal bulge) No   Sensation of incomplete emptying Yes   Past use of pessary device No   If you answered yes to the previous question, please list the devices you have used below.  n/a   Bowel Symptoms: Are you experiencing any of the following?    Constipation No   Diarrhea  No   Hematochezia (bloody stool) No   Incomplete evacuation of stool No   Involuntary loss of formed stool No   Fecal smearing/urgency Yes   Involuntary loss of gas Yes   Vaginal Symptoms: Are you experiencing any of the following?     Abnormal vaginal bleeding  No   Vaginal dryness No   Sexually active  No   Dyspareunia (painful intercourse) No   Estrogen use  No       GYN & OB History  No LMP recorded.   Date of Last Pap: No result found    OB History    Para Term  AB Living   1 1 1     1   SAB TAB Ectopic Multiple Live Births         0 1      # Outcome Date GA Lbr Anurag/2nd Weight Sex Delivery Anes PTL Lv   1 Term 19 37w3d  2.8 kg (6 lb 2.8 oz) M Vag-Spont EPI N GILLIAN      Complications: Chorioamnionitis     OB  Largest: 6#  Forceps:  Episiotomy: 2nd degree    GYN  Menarche: 15  Menstrual cycle: nomral  Menopause: amenorrhea prior to pregnancy   Hysterectomy: no  Ovaries: present       Review of Systems  Review of " Systems   Constitutional: Negative for activity change, appetite change, chills, diaphoresis, fatigue, fever and unexpected weight change.   Respiratory: Negative for cough.    Gastrointestinal: Negative for abdominal distention, abdominal pain, anal bleeding, blood in stool, constipation, diarrhea, nausea, rectal pain and vomiting.   Genitourinary: Negative for decreased urine volume, difficulty urinating, dyspareunia, dysuria, enuresis, flank pain, frequency, genital sores, hematuria, menstrual problem, pelvic pain, urgency, vaginal bleeding, vaginal discharge and vaginal pain.   Musculoskeletal: Negative for back pain.   Skin: Negative for color change, pallor, rash and wound.   Allergic/Immunologic: Negative for environmental allergies, food allergies and immunocompromised state.   Hematological: Negative for adenopathy. Does not bruise/bleed easily.   Psychiatric/Behavioral: Negative for agitation, behavioral problems, confusion and sleep disturbance.           Objective:     Physical Exam   Constitutional: She is oriented to person, place, and time. She appears well-developed and well-nourished.   HENT:   Head: Normocephalic and atraumatic.   Neck: Normal range of motion. Neck supple.   Pulmonary/Chest: No respiratory distress. She has no wheezes.   Musculoskeletal: Normal range of motion.   Neurological: She is alert and oriented to person, place, and time.   Skin: No cyanosis. Nails show no clubbing.   Psychiatric: She has a normal mood and affect. Her behavior is normal. Judgment and thought content normal.             HCM  Pap's: normal   Mammo: normal 2018  Colonoscopy more than 10 years, normal   Dexa: n/a    Assessment:        1. Screening for human papillomavirus (HPV)    2. Pessary maintenance    3. Posterior vaginal wall prolapse    4. Prolapse of anterior vaginal wall             Plan:           1. Fecal incontinence/ incomplete bowel emptying likely secondary to outlet obstruction given her  recel  --Fiber may better control cholesterol and blood glucose.  Start daily fiber.  Take 1 tsp of fiber powder (psyllium or other sugar-free powder).  Mix in 8 oz of water.  Take x 3-5 days.  Then, increase fiber by 1 tsp every 3-5 days until stool is easy to pass.  ( Metamucil, benefiber )   --Keep a bowel diary   -- consider Lomotil in the future  --Pelvic floor PT  --Endoanal sonogram to evaluate the sphincter   --Anal manometry   --We discussed treatment option including Pelvic floor PT, SNS, and Sphincteroplasty. Risks, benefits reviewed in detail. She has worked with [facility], with mild improvement, she has recently started working with [facility] at [facility]   -- Colorectal evaluation     2.  Mixed urinary incontinence, urge > stress: now urge incontinence more bothersome    --Empty bladder every 3 hours.  Empty well: wait a minute, lean forward on toilet.    --Avoid dietary irritants (see sheet).  Keep diary x 3-5 days to determine your irritants.  --KEGELS: do 10 in AM and 10 in PM, holding each x 10 seconds.  When you feel urge to go, STOP, KEGEL, and when urge has passed, then go to bathroom.  Consider PT in future.    --URGE: Ditropan 10 mg daily or Myrbetriq 50 mg daily.  SE profile reviewed.   Takes 2-4 weeks to see if will have effect.  For dry mouth: get sour, sugar free lozenge or gum. Currently breast feeding.   --STRESS:  Pessary vs. Sling vs impressa     3. Prolapse: Stage 2 anterior and posterior vaginal wall prolapse from prior visit.   --#2 1/2 [facility]   --Pessary in placed however she continues to have difficulty, likely will need a larger sized pessary  --Daily pelvic floor exercises as assigned,  --Non surgical options discussed with patient    --Surgical options also discussed, she's more interested in the non-surgical treatment options for now.     4. Pap smear performed today     Approximately 15 min were spent in consult, 90 % in discussion.     Laury Casillas DO  Female Pelvic  Medicine and Reconstructive Surgery  [facility] [place], [place]

## 2020-05-22 LAB
FINAL PATHOLOGIC DIAGNOSIS: NORMAL
Lab: NORMAL

## 2020-05-23 ENCOUNTER — PATIENT MESSAGE (OUTPATIENT)
Dept: UROGYNECOLOGY | Facility: CLINIC | Age: 50
End: 2020-05-23

## 2020-05-25 NOTE — PROGRESS NOTES
CRS Office Visit History and Physical    The patient location is: Home  The chief complaint leading to consultation is: Fecal smearing    Visit type: audiovisual    Face to Face time with patient: 15min  25min minutes of total time spent on the encounter, which includes face to face time and non-face to face time preparing to see the patient (eg, review of tests), Obtaining and/or reviewing separately obtained history, Documenting clinical information in the electronic or other health record, Independently interpreting results (not separately reported) and communicating results to the patient/family/caregiver, or Care coordination (not separately reported).     Each patient to whom he or she provides medical services by telemedicine is:  (1) informed of the relationship between the physician and patient and the respective role of any other health care provider with respect to management of the patient; and (2) notified that he or she may decline to receive medical services by telemedicine and may withdraw from such care at any time.      SUBJECTIVE:     Chief Complaint: Fecal smearing    History of Present Illness:  The patient is new patient to this practice.   Course is as follows:  Patient is a 49 y.o. female presents with fecal incontinence.  Delivered 10 months ago, head a 2nd degree tear, no sutures.  Has had weak pelvic floor since then, doing PT.  Issues with fecal incontinence and prolapse diagnosed after delivery.  Ultimately was treated for vaginal prolapse with pessary, which did help.  PT also helped.  Controlling stool consistency with Benefiber (usually once/day, sometimes more if stool is loose)  A few weeks ago was moving and stopped doing PT.  Started feeling a bulge; pessary was out at that time.  Saw Dr Casillas last week. Tried a different pessary, which she thinks is flipping around.  Now has trouble holding urine for >1hr.    Prior colonoscopy: Yes - 2010 (normal)  Prior abdominal surgery:  No  Prior pelvic or anorectal surgery: No  Family history of colon/rectal cancer: No  Steroid or other immunosuppression: No  Blood thinners: No  Current stool softeners or fiber supplement: Yes - see above  Active smoking: No  Prior deliveries: Yes - 1  complicated by tear: Yes - see above    [place] ([place]):  Leakage of solid stool: Sometimes (2)        Leakage of liquid stool: Never (0)     Leakage of flatus:  Rarely (1)      Wear a pad:   Daily (4)  Alter life:    Rarely (1)      *Can't always go for walks before having an accident at work.  Total: 8    Bowel movements per month: Daily (fees that she does not completely empty)  New pessary makes it so that she sometimes has to push on the vagina to evacuate stool.  Softer stool is easier to empty.  As a child, she struggles with constipation.    Leakage of urine: Yes - Seeing UroGyn for this  Feel something bulging through vagina? Yes - using Pessary    Last Colonoscopy: 2010    Review of patient's allergies indicates:   Allergen Reactions    Aspirin Nausea Only     Powdered form only        Past Medical History:   Diagnosis Date    Allergy     Anxiety     Disorder of muscle 9/4/2019    Iron deficiency anemia secondary to inadequate dietary iron intake 7/19/2019    Subclinical hypothyroidism      Past Surgical History:   Procedure Laterality Date    dental implants      Egg retrieval       Family History   Problem Relation Age of Onset    Alcohol abuse Brother     Depression Brother     Diabetes Mellitus Paternal Grandmother     Alcohol abuse Paternal Grandfather     Breast cancer Mother 71    Cancer Mother         Breast    Melanoma Neg Hx      Social History     Tobacco Use    Smoking status: Never Smoker    Smokeless tobacco: Never Used   Substance Use Topics    Alcohol use: Yes     Comment: socially    Drug use: No        Review of Systems:  ROS    OBJECTIVE:       Physical Exam:  General: White female in no distress   Neuro: Alert and  oriented x 4.   Respiratory: Respirations are even and unlabored      ASSESSMENT/PLAN:     Diagnoses and all orders for this visit:    Fecal smearing  -     Ambulatory referral/consult to Colorectal Surgery    Incontinence of feces with fecal urgency  -     Ambulatory referral/consult to Colorectal Surgery  -     MRI Defecography; Future  -     Case request GI: COLONOSCOPY, with me      50yo F w/ fecal incontinence 10mo following vaginal delivery, currently on fiber bulking and doing Pelvic Floor PT without resolution of symptoms.  Will plan for colonoscopy (with detailed exam at that time) and defecography to evaluate sphincter and for intussusception.  Briefly reviewed possible sphincteroplasty vs Interstim pending results of evaluation.    Karina Carreon MD  Staff Surgeon  Colon & Rectal Surgery

## 2020-05-26 ENCOUNTER — OFFICE VISIT (OUTPATIENT)
Dept: SURGERY | Facility: CLINIC | Age: 50
End: 2020-05-26
Attending: COLON & RECTAL SURGERY
Payer: COMMERCIAL

## 2020-05-26 DIAGNOSIS — R15.9 INCONTINENCE OF FECES WITH FECAL URGENCY: ICD-10-CM

## 2020-05-26 DIAGNOSIS — R15.1 FECAL SMEARING: ICD-10-CM

## 2020-05-26 DIAGNOSIS — R15.2 INCONTINENCE OF FECES WITH FECAL URGENCY: ICD-10-CM

## 2020-05-26 PROCEDURE — 99202 OFFICE O/P NEW SF 15 MIN: CPT | Mod: 95,,, | Performed by: COLON & RECTAL SURGERY

## 2020-05-26 PROCEDURE — 99202 PR OFFICE/OUTPT VISIT, NEW, LEVL II, 15-29 MIN: ICD-10-PCS | Mod: 95,,, | Performed by: COLON & RECTAL SURGERY

## 2020-05-26 NOTE — LETTER
May 29, 2020      Laury Casillas, DO  [address]  [facility] [address]           [facility]  [address]  [facility] [address]  Phone: [phone]          Patient: Dipti Pacheco   MR Number: [id number]   YOB: 1970   Date of Visit: 5/26/2020       Dear Dr. Laury Casillas:    Thank you for referring Dipti Pacheco to me for evaluation. Attached you will find relevant portions of my assessment and plan of care.    If you have questions, please do not hesitate to call me. I look forward to following Dipti Pacheco along with you.    Sincerely,    Karina Carreon MD    Enclosure  CC:  No Recipients    If you would like to receive this communication electronically, please contact [email or web address] or [phone] to request more information on [employer] Link access.    For providers and/or their staff who would like to refer a patient to [facility], please contact us through our one-stop-shop provider referral line, [facility], at [phone].    If you feel you have received this communication in error or would no longer like to receive these types of communications, please e-mail [email or web address]

## 2020-05-27 LAB
HPV HR 12 DNA SPEC QL NAA+PROBE: NEGATIVE
HPV16 AG SPEC QL: NEGATIVE
HPV18 DNA SPEC QL NAA+PROBE: NEGATIVE

## 2020-06-02 DIAGNOSIS — R15.2 INCONTINENCE OF FECES WITH FECAL URGENCY: Primary | ICD-10-CM

## 2020-06-02 DIAGNOSIS — Z12.11 SPECIAL SCREENING FOR MALIGNANT NEOPLASMS, COLON: Primary | ICD-10-CM

## 2020-06-02 DIAGNOSIS — R15.9 INCONTINENCE OF FECES WITH FECAL URGENCY: Primary | ICD-10-CM

## 2020-06-02 RX ORDER — SODIUM, POTASSIUM,MAG SULFATES 17.5-3.13G
1 SOLUTION, RECONSTITUTED, ORAL ORAL DAILY
Qty: 1 KIT | Refills: 0 | Status: SHIPPED | OUTPATIENT
Start: 2020-06-02 | End: 2020-06-04

## 2020-06-15 ENCOUNTER — LAB VISIT (OUTPATIENT)
Dept: INTERNAL MEDICINE | Facility: CLINIC | Age: 50
End: 2020-06-15
Attending: COLON & RECTAL SURGERY
Payer: COMMERCIAL

## 2020-06-15 DIAGNOSIS — R15.2 INCONTINENCE OF FECES WITH FECAL URGENCY: ICD-10-CM

## 2020-06-15 DIAGNOSIS — R15.9 INCONTINENCE OF FECES WITH FECAL URGENCY: ICD-10-CM

## 2020-06-15 LAB — SARS-COV-2 RNA RESP QL NAA+PROBE: NOT DETECTED

## 2020-06-15 PROCEDURE — U0003 INFECTIOUS AGENT DETECTION BY NUCLEIC ACID (DNA OR RNA); SEVERE ACUTE RESPIRATORY SYNDROME CORONAVIRUS 2 (SARS-COV-2) (CORONAVIRUS DISEASE [COVID-19]), AMPLIFIED PROBE TECHNIQUE, MAKING USE OF HIGH THROUGHPUT TECHNOLOGIES AS DESCRIBED BY CMS-2020-01-R: HCPCS

## 2020-06-17 ENCOUNTER — HOSPITAL ENCOUNTER (OUTPATIENT)
Facility: HOSPITAL | Age: 50
Discharge: HOME | End: 2020-06-17
Attending: COLON & RECTAL SURGERY | Admitting: COLON & RECTAL SURGERY
Payer: COMMERCIAL

## 2020-06-17 ENCOUNTER — ANESTHESIA EVENT (OUTPATIENT)
Dept: ENDOSCOPY | Facility: HOSPITAL | Age: 50
End: 2020-06-17
Payer: COMMERCIAL

## 2020-06-17 ENCOUNTER — ANESTHESIA (OUTPATIENT)
Dept: ENDOSCOPY | Facility: HOSPITAL | Age: 50
End: 2020-06-17
Payer: COMMERCIAL

## 2020-06-17 ENCOUNTER — HOSPITAL ENCOUNTER (OUTPATIENT)
Dept: RADIOLOGY | Facility: HOSPITAL | Age: 50
Discharge: HOME | End: 2020-06-17
Attending: COLON & RECTAL SURGERY
Payer: COMMERCIAL

## 2020-06-17 VITALS
RESPIRATION RATE: 18 BRPM | HEIGHT: 68 IN | OXYGEN SATURATION: 100 % | DIASTOLIC BLOOD PRESSURE: 63 MMHG | HEART RATE: 57 BPM | SYSTOLIC BLOOD PRESSURE: 106 MMHG | WEIGHT: 141.13 LBS | BODY MASS INDEX: 21.39 KG/M2 | TEMPERATURE: 98 F

## 2020-06-17 DIAGNOSIS — R15.9 INCONTINENCE OF FECES WITH FECAL URGENCY: Primary | ICD-10-CM

## 2020-06-17 DIAGNOSIS — R15.2 INCONTINENCE OF FECES WITH FECAL URGENCY: Primary | ICD-10-CM

## 2020-06-17 DIAGNOSIS — R15.9 INCONTINENCE OF FECES WITH FECAL URGENCY: ICD-10-CM

## 2020-06-17 DIAGNOSIS — R15.2 INCONTINENCE OF FECES WITH FECAL URGENCY: ICD-10-CM

## 2020-06-17 DIAGNOSIS — R15.9 FECAL INCONTINENCE: ICD-10-CM

## 2020-06-17 LAB
B-HCG UR QL: NEGATIVE
CTP QC/QA: YES

## 2020-06-17 PROCEDURE — E9220 PRA ENDO ANESTHESIA: HCPCS | Mod: ,,, | Performed by: NURSE ANESTHETIST, CERTIFIED REGISTERED

## 2020-06-17 PROCEDURE — 37000008 HC ANESTHESIA 1ST 15 MINUTES: Performed by: COLON & RECTAL SURGERY

## 2020-06-17 PROCEDURE — 63600175 PHARM REV CODE 636 W HCPCS: Performed by: NURSE ANESTHETIST, CERTIFIED REGISTERED

## 2020-06-17 PROCEDURE — 81025 URINE PREGNANCY TEST: CPT | Performed by: COLON & RECTAL SURGERY

## 2020-06-17 PROCEDURE — 72195 MRI DEFECOGRAPHY: ICD-10-PCS | Mod: 26,,, | Performed by: RADIOLOGY

## 2020-06-17 PROCEDURE — E9220 PRA ENDO ANESTHESIA: ICD-10-PCS | Mod: ,,, | Performed by: NURSE ANESTHETIST, CERTIFIED REGISTERED

## 2020-06-17 PROCEDURE — G0121 COLON CA SCRN NOT HI RSK IND: ICD-10-PCS | Mod: 53,,, | Performed by: COLON & RECTAL SURGERY

## 2020-06-17 PROCEDURE — 72195 MRI PELVIS W/O DYE: CPT | Mod: 26,,, | Performed by: RADIOLOGY

## 2020-06-17 PROCEDURE — 37000009 HC ANESTHESIA EA ADD 15 MINS: Performed by: COLON & RECTAL SURGERY

## 2020-06-17 PROCEDURE — 25000003 PHARM REV CODE 250: Performed by: COLON & RECTAL SURGERY

## 2020-06-17 PROCEDURE — 72195 MRI PELVIS W/O DYE: CPT | Mod: TC

## 2020-06-17 PROCEDURE — G0121 COLON CA SCRN NOT HI RSK IND: HCPCS | Mod: 53,,, | Performed by: COLON & RECTAL SURGERY

## 2020-06-17 PROCEDURE — G0121 COLON CA SCRN NOT HI RSK IND: HCPCS | Performed by: COLON & RECTAL SURGERY

## 2020-06-17 RX ORDER — PROPOFOL 10 MG/ML
VIAL (ML) INTRAVENOUS
Status: DISCONTINUED | OUTPATIENT
Start: 2020-06-17 | End: 2020-06-17

## 2020-06-17 RX ORDER — PROPOFOL 10 MG/ML
VIAL (ML) INTRAVENOUS CONTINUOUS PRN
Status: DISCONTINUED | OUTPATIENT
Start: 2020-06-17 | End: 2020-06-17

## 2020-06-17 RX ORDER — SODIUM CHLORIDE 9 MG/ML
INJECTION, SOLUTION INTRAVENOUS CONTINUOUS
Status: DISCONTINUED | OUTPATIENT
Start: 2020-06-17 | End: 2020-06-17 | Stop reason: HOSPADM

## 2020-06-17 RX ADMIN — PROPOFOL 50 MG: 10 INJECTION, EMULSION INTRAVENOUS at 09:06

## 2020-06-17 RX ADMIN — SODIUM CHLORIDE: 0.9 INJECTION, SOLUTION INTRAVENOUS at 09:06

## 2020-06-17 RX ADMIN — PROPOFOL 150 MCG/KG/MIN: 10 INJECTION, EMULSION INTRAVENOUS at 09:06

## 2020-06-17 RX ADMIN — SODIUM CHLORIDE: 0.9 INJECTION, SOLUTION INTRAVENOUS at 08:06

## 2020-06-17 NOTE — TRANSFER OF CARE
"Anesthesia Transfer of Care Note    Patient: Dipti Pacheco    Procedure(s) Performed: Procedure(s) (LRB):  COLONOSCOPY, with me (N/A)    Patient location: GI    Anesthesia Type: general    Transport from OR: Transported from OR on room air with adequate spontaneous ventilation    Post pain: adequate analgesia    Post assessment: no apparent anesthetic complications and tolerated procedure well    Post vital signs: stable    Level of consciousness: responds to stimulation and lethargic    Nausea/Vomiting: no nausea/vomiting    Complications: none    Transfer of care protocol was followed      Last vitals:   Visit Vitals  BP (!) 106/55 (BP Location: Left arm, Patient Position: Lying)   Pulse (!) 56   Temp 36.6 °C (97.8 °F) (Temporal)   Resp 18   Ht 5' 8" (1.727 m)   Wt 64 kg (141 lb 1.5 oz)   SpO2 100%   Breastfeeding Yes   BMI 21.45 kg/m²     "

## 2020-06-17 NOTE — [employer]ATION PATIENT INSTRUCTIONS
Discharge Summary/Instructions after an Endoscopic Procedure  Patient Name: Dipti Pacheco  Patient MRN: [id number]  Patient YOB: 1970 Wednesday, June 17, 2020  Karina Carreon MD  RESTRICTIONS:  During your procedure today, you received medications for sedation.  These   medications may affect your judgment, balance and coordination.  Therefore,   for 24 hours, you have the following restrictions:   - DO NOT drive a car, operate machinery, make legal/financial decisions,   sign important papers or drink alcohol.    ACTIVITY:  Today: no heavy lifting, straining or running due to procedural   sedation/anesthesia.  The following day: return to full activity including work.  DIET:  Eat and drink normally unless instructed otherwise.     TREATMENT FOR COMMON SIDE EFFECTS:  - Mild abdominal pain, nausea, belching, bloating or excessive gas:  rest,   eat lightly and use a heating pad.  - Sore Throat: treat with throat lozenges and/or gargle with warm salt   water.  - Because air was used during the procedure, expelling large amounts of air   from your rectum or belching is normal.  - If a bowel prep was taken, you may not have a bowel movement for 1-3 days.    This is normal.  SYMPTOMS TO WATCH FOR AND REPORT TO YOUR PHYSICIAN:  1. Abdominal pain or bloating, other than gas cramps.  2. Chest pain.  3. Back pain.  4. Signs of infection such as: chills or fever occurring within 24 hours   after the procedure.  5. Rectal bleeding, which would show as bright red, maroon, or black stools.   (A tablespoon of blood from the rectum is not serious, especially if   hemorrhoids are present.)  6. Vomiting.  7. Weakness or dizziness.  GO DIRECTLY TO THE NEAREST EMERGENCY ROOM IF YOU HAVE ANY OF THE FOLLOWING:      Difficulty breathing              Chills and/or fever over 101 F   Persistent vomiting and/or vomiting blood   Severe abdominal pain   Severe chest pain   Black, tarry stools   Bleeding- more than one  tablespoon   Any other symptom or condition that you feel may need urgent attention  Your doctor recommends these additional instructions:  If any biopsies were taken, your doctors clinic will contact you in 1 to 2   weeks with any results.  - Discharge patient to home.   - Resume previous diet.   - Continue present medications.   - Repeat colonoscopy because the examination was incomplete. Referral to Dr Dorman for balloon-assisted colonoscopy.  - Return to my office.   - Written discharge instructions were provided to the patient.   - The signs and symptoms of potential delayed complications were discussed   with the patient.   - Patient has a contact number available for emergencies.   - Return to normal activities tomorrow.  For questions, problems or results please call your physician - Karina Carreon MD at Work:  [phone].  [facility], EMERGENCY ROOM PHONE NUMBER: [phone]  IF A COMPLICATION OR EMERGENCY SITUATION ARISES AND YOU ARE UNABLE TO REACH   YOUR PHYSICIAN - GO DIRECTLY TO THE EMERGENCY ROOM.  Karina Carreon MD  6/17/2020 10:23:19 AM  This report has been verified and signed electronically.  [employer]

## 2020-06-17 NOTE — OR NURSING
Unable to reach cecum. Pt tolerated procedure well, side rails up x2 for safe transport to recovery.

## 2020-06-17 NOTE — H&P
COLONOSCOPY HISTORY AND PHYSICAL EXAM    Procedure : Colonoscopy      INDICATIONS: fecal incontinence    Family Hx of CRC: denies    Last Colonoscopy:  2010  Findings: Normal       Past Medical History:   Diagnosis Date    Allergy     Anxiety     Disorder of muscle 9/4/2019    Iron deficiency anemia secondary to inadequate dietary iron intake 7/19/2019    Subclinical hypothyroidism      Sedation Problems: NO  Family History   Problem Relation Age of Onset    Alcohol abuse Brother     Depression Brother     Diabetes Mellitus Paternal Grandmother     Alcohol abuse Paternal Grandfather     Breast cancer Mother 71    Cancer Mother         Breast    Melanoma Neg Hx      Fam Hx of Sedation Problems: NO  Social History     Socioeconomic History    Marital status: Single     Spouse name: Not on file    Number of children: Not on file    Years of education: Not on file    Highest education level: Not on file   Occupational History     Employer: [employer]   Social Needs    Financial resource strain: Not on file    Food insecurity     Worry: Not on file     Inability: Not on file    Transportation needs     Medical: Not on file     Non-medical: Not on file   Tobacco Use    Smoking status: Never Smoker    Smokeless tobacco: Never Used   Substance and Sexual Activity    Alcohol use: Yes     Comment: socially    Drug use: No    Sexual activity: Not Currently     Partners: Male   Lifestyle    Physical activity     Days per week: Not on file     Minutes per session: Not on file    Stress: Not on file   Relationships    Social connections     Talks on phone: Not on file     Gets together: Not on file     Attends Mandaeism service: Not on file     Active member of club or organization: Not on file     Attends meetings of clubs or organizations: Not on file     Relationship status: Not on file   Other Topics Concern    Are you pregnant or think you may be? No    Breast-feeding No   Social History  Narrative    Not on file       Review of Systems - Negative except   Respiratory ROS: no dyspnea  Cardiovascular ROS: no exertional chest pain  Gastrointestinal ROS: NO abdominal discomfort,  NO rectal bleeding  Musculoskeletal ROS: no muscular pain  Neurological ROS: no recent stroke    Physical Exam:  There were no vitals taken for this visit.  General: well developed, well nourished, no distress  Head: normocephalic, atraumatic  Mallampati Score   Neck: supple, symmetrical, trachea midline  Lungs:  normal respiratory effort  Heart: regular rate and rhythm  Abdomen: soft, non-tender non-distented; bowel sounds normal; no masses,  no organomegaly  Extremities: no cyanosis or edema, or clubbing    ASA:  II    PLAN  COLONOSCOPY.    SedationPlan :MAC    The details of the procedure, the possible need for biopsy or polypectomy and the potential risks including bleeding, perforation, missed polyps were discussed in detail.

## 2020-06-17 NOTE — ANESTHESIA POSTPROCEDURE EVALUATION
Anesthesia Post Evaluation    Patient: Dipti Pacheco    Procedure(s) Performed: Procedure(s) (LRB):  COLONOSCOPY, with me (N/A)    Final Anesthesia Type: general    Patient location during evaluation: GI PACU  Patient participation: Yes- Able to Participate  Level of consciousness: awake and alert and oriented  Post-procedure vital signs: reviewed and stable  Pain management: adequate  Airway patency: patent    PONV status at discharge: No PONV  Anesthetic complications: no      Cardiovascular status: blood pressure returned to baseline  Respiratory status: unassisted, spontaneous ventilation and room air  Hydration status: euvolemic  Follow-up not needed.          Vitals Value Taken Time   /63 06/17/20 1049   Temp 36.6 °C (97.8 °F) 06/17/20 1026   Pulse 57 06/17/20 1049   Resp 18 06/17/20 1049   SpO2 100 % 06/17/20 1049         Event Time   Out of Recovery 11:01:15         Pain/Griselda Score: Griselda Score: 10 (6/17/2020 10:27 AM)

## 2020-06-17 NOTE — ANESTHESIA PREPROCEDURE EVALUATION
06/17/2020  Dipti Pacheco is a 50 y.o., female.  Past Medical History:   Diagnosis Date    Allergy     Anxiety     Disorder of muscle 9/4/2019    Iron deficiency anemia secondary to inadequate dietary iron intake 7/19/2019    Subclinical hypothyroidism        Past Surgical History:   Procedure Laterality Date    dental implants      Egg retrieval         Family History   Problem Relation Age of Onset    Alcohol abuse Brother     Depression Brother     Diabetes Mellitus Paternal Grandmother     Alcohol abuse Paternal Grandfather     Breast cancer Mother 71    Cancer Mother         Breast    Melanoma Neg Hx        Social History     Socioeconomic History    Marital status: Single     Spouse name: Not on file    Number of children: Not on file    Years of education: Not on file    Highest education level: Not on file   Occupational History     Employer: [employer]   Social Needs    Financial resource strain: Not on file    Food insecurity     Worry: Not on file     Inability: Not on file    Transportation needs     Medical: Not on file     Non-medical: Not on file   Tobacco Use    Smoking status: Never Smoker    Smokeless tobacco: Never Used   Substance and Sexual Activity    Alcohol use: Yes     Comment: socially    Drug use: No    Sexual activity: Not Currently     Partners: Male   Lifestyle    Physical activity     Days per week: Not on file     Minutes per session: Not on file    Stress: Not on file   Relationships    Social connections     Talks on phone: Not on file     Gets together: Not on file     Attends Mu-ism service: Not on file     Active member of club or organization: Not on file     Attends meetings of clubs or organizations: Not on file     Relationship status: Not on file   Other Topics Concern    Are you pregnant or think you may be? No     Breast-feeding No   Social History Narrative    Not on file       No current facility-administered medications for this encounter.      Current Outpatient Medications   Medication Sig Dispense Refill    B INFANTIS/B ANI/B JET/B BIFID (PROBIOTIC DIGESTIVE CARE ORAL) Take 1 capsule by mouth as needed.      calcium-vitamin D3 (CALCIUM 500 + D) 500 mg(1,250mg) -200 unit per tablet Take 1 tablet by mouth 2 (two) times daily with meals.      conjugated estrogens (PREMARIN) vaginal cream 1 g with applicator or dime-sized amt with finger in vagina nightly x 2 weeks, then twice a week thereafter 45 g 4    conjugated estrogens (PREMARIN) vaginal cream 1 g with applicator or dime-sized amt with finger in vagina nightly x 2 weeks, then twice a week thereafter 45 g 12    cyanocobalamin, vitamin B-12, (VITAMIN B-12) 50 mcg tablet Take 50 mcg by mouth once daily.      erythromycin (ROMYCIN) ophthalmic ointment Place into the right eye every evening. 3.5 g 0    hydrocortisone 2.5 % cream Apply topically 2 (two) times daily. 20 g 1    jack salguero ointment Apply topically 3 (three) times daily. Apply after feeding. Do not wash off. This compounded medication expires in 30 days. 30 g 1    multivitamin (ONE DAILY MULTIVITAMIN) per tablet Take 1 tablet by mouth once daily.      prenatal vit 55-iron-folic-om3 29-1-430 mg CPKD Take 1 tablet by mouth once daily.      witch hazel (PREPARATION H, WITCH HAZEL,) 50 % PadM Apply 1 application topically 2 (two) times daily. 1 each 2       Review of patient's allergies indicates:   Allergen Reactions    Aspirin Nausea Only     Powdered form only          Anesthesia Evaluation    I have reviewed the Patient Summary Reports.    I have reviewed the Nursing Notes.    I have reviewed the Medications.     Review of Systems  Anesthesia Hx:  No problems with previous Anesthesia  Denies Family Hx of Anesthesia complications.   Denies Personal Hx of Anesthesia complications.    Social:  Non-Smoker, Social Alcohol Use    Hematology/Oncology:  Hematology Normal   Oncology Normal     EENT/Dental:   Bilateral hearing loss   Cardiovascular:  Cardiovascular Normal Exercise tolerance: good     Pulmonary:  Pulmonary Normal    Renal/:  Renal/ Normal     Hepatic/GI:  Hepatic/GI Normal Bowel Prep.    Musculoskeletal:  Musculoskeletal Normal    Neurological:  Neurology Normal    Endocrine:   Hypothyroidism    Dermatological:  Skin Normal    Psych:   anxiety          Physical Exam  General:  Well nourished    Airway/Jaw/Neck:  Airway Findings: Mouth Opening: Normal Tongue: Normal  Mallampati: II  Improves to II with phonation.  TM Distance: Normal, at least 6 cm        Eyes/Ears/Nose:  EYES/EARS/NOSE FINDINGS: Normal   Dental:  DENTAL FINDINGS: Normal   Chest/Lungs:  Chest/Lungs Clear    Heart/Vascular:  Heart Findings: Normal Heart murmur: negative Vascular Findings: Normal    Abdomen:  Abdomen Findings: Normal    Musculoskeletal:  Musculoskeletal Findings: Normal   Skin:  Skin Findings: Normal    Mental Status:  Mental Status Findings: Normal        Anesthesia Plan  Type of Anesthesia, risks & benefits discussed:  Anesthesia Type:  general  Patient's Preference: General  Intra-op Monitoring Plan: standard ASA monitors  Intra-op Monitoring Plan Comments:   Post Op Pain Control Plan: multimodal analgesia  Post Op Pain Control Plan Comments:   Induction:   IV  Beta Blocker:  Patient is not currently on a Beta-Blocker (No further documentation required).       Informed Consent: Patient understands risks and agrees with Anesthesia plan.  Questions answered. Anesthesia consent signed with patient.  ASA Score: 2     Day of Surgery Review of History & Physical: I have interviewed and examined the patient. I have reviewed the patient's H&P dated:  There are no significant changes.  H&P update referred to the provider.         Ready For Surgery From Anesthesia Perspective.

## 2020-06-22 NOTE — PHYSICIAN QUERY
PT Name: Dipti Pacheco  MR #: [id number]     Physician Query Form - Documentation Clarification      CDS/: Ethel Harris                 Contact information:[email or web address]    This form is a permanent document in the medical record.     Query Date: June 22, 2020    By submitting this query, we are merely seeking further clarification of documentation. Please utilize your independent clinical judgment when addressing the question(s) below.    The Medical record reflects the following:    Supporting Clinical Findings Location in Medical Record   INDICATIONS: fecal incontinence       H&P   Indications:          Screening for colorectal malignant neoplasm       Procedure report                                                                            Doctor, Please clarify the reason for coloscopy based on the above clinical findings?    Provider Use Only      Screening for colorectal neoplasm                                                                                                                           [  ] Clinically Undetermined

## 2020-06-30 ENCOUNTER — NURSE TRIAGE (OUTPATIENT)
Dept: ADMINISTRATIVE | Facility: CLINIC | Age: 50
End: 2020-06-30

## 2020-06-30 NOTE — TELEPHONE ENCOUNTER
Pt contacted through Post Procedural Symptom Tracking. Denies any cough, fever, or difficulty breathing since procedure.      Reason for Disposition   Health Information question, no triage required and triager able to answer question    Protocols used: INFORMATION ONLY CALL-A-AH

## 2020-08-19 ENCOUNTER — OFFICE VISIT (OUTPATIENT)
Dept: UROGYNECOLOGY | Facility: CLINIC | Age: 50
End: 2020-08-19
Payer: COMMERCIAL

## 2020-08-19 VITALS
DIASTOLIC BLOOD PRESSURE: 62 MMHG | SYSTOLIC BLOOD PRESSURE: 94 MMHG | BODY MASS INDEX: 21.99 KG/M2 | WEIGHT: 145.06 LBS | HEIGHT: 68 IN

## 2020-08-19 DIAGNOSIS — N39.46 MIXED URINARY INCONTINENCE DUE TO FEMALE GENITAL PROLAPSE: ICD-10-CM

## 2020-08-19 DIAGNOSIS — N81.10 PROLAPSE OF ANTERIOR VAGINAL WALL: ICD-10-CM

## 2020-08-19 DIAGNOSIS — N81.9 MIXED URINARY INCONTINENCE DUE TO FEMALE GENITAL PROLAPSE: ICD-10-CM

## 2020-08-19 DIAGNOSIS — N81.2 UTEROVAGINAL PROLAPSE, INCOMPLETE: Primary | ICD-10-CM

## 2020-08-19 PROCEDURE — 99213 OFFICE O/P EST LOW 20 MIN: CPT | Mod: S$GLB,,, | Performed by: OBSTETRICS & GYNECOLOGY

## 2020-08-19 PROCEDURE — 99213 PR OFFICE/OUTPT VISIT, EST, LEVL III, 20-29 MIN: ICD-10-PCS | Mod: S$GLB,,, | Performed by: OBSTETRICS & GYNECOLOGY

## 2020-08-19 PROCEDURE — 3008F BODY MASS INDEX DOCD: CPT | Mod: CPTII,S$GLB,, | Performed by: OBSTETRICS & GYNECOLOGY

## 2020-08-19 PROCEDURE — 99999 PR PBB SHADOW E&M-EST. PATIENT-LVL III: CPT | Mod: PBBFAC,,, | Performed by: OBSTETRICS & GYNECOLOGY

## 2020-08-19 PROCEDURE — 99999 PR PBB SHADOW E&M-EST. PATIENT-LVL III: ICD-10-PCS | Mod: PBBFAC,,, | Performed by: OBSTETRICS & GYNECOLOGY

## 2020-08-19 PROCEDURE — 3008F PR BODY MASS INDEX (BMI) DOCUMENTED: ICD-10-PCS | Mod: CPTII,S$GLB,, | Performed by: OBSTETRICS & GYNECOLOGY

## 2020-08-19 RX ORDER — HYDROCHLOROTHIAZIDE 12.5 MG/1
CAPSULE ORAL
COMMUNITY
Start: 2020-06-26

## 2020-08-30 NOTE — PROGRESS NOTES
Subjective:       Patient ID: Dipti Pacheco is a 50 y.o. female.    Chief Complaint:  Vaginal Prolapse (follow up)        History of Present Illness  HPI 50 Y O F P1   has a past medical history of Allergy, Anxiety, Disorder of muscle (2019), Iron deficiency anemia secondary to inadequate dietary iron intake (2019), and Subclinical hypothyroidism. referred by NP Kristin Wallace for pelvic organ prolapse and fecal incompetence. She began in after She began Kegel exercise early on but stopped and has now restarted. She had a induced due to non fetal heart tracing which lead to an pulido balloon induction of labor at 37 wks IVF pregnancy. She had a  approximately 38 hours in labor no forceps no vacuum. Immediately after the delivery there was very little sensation of the pelvis. She denies previous episodes of fecal incontinence, since the delivery it ranges from daily to every others day. The amount varies from a small amount to large amounts especially more bothersome when it's more loose.      Interval follow up:     Interval  HP since the last visit   1)  UI:  (--) FLAQUITO  (+) UUI  - at night if bladder too full.  (--) pads:.  Daytime frequency: Q 4 -5 hours.  Nocturia: No:    (-) dysuria-  (--) hematuria,  (--) frequent UTIs.  (+) complete bladder emptying.     2)  POP:   Symptoms:(+)  \: prolapse rides past the pessary.  (--) vaginal bleeding. (--) vaginal discharge. (-) sexually active.  (--) dyspareunia.   (--)  Vaginal dryness.  (--) vaginal estrogen use.     3)  BM:  (+) constipation/straining.  (--) chronic diarrhea. (--) hematochezia.  (--) fecal incontinence.  (--) fecal smearing/urgency.  (--) incomplete evacuation.      4)Pessary- denies pain,bleeding or current discharge. Using # 2 1/2 STGH does not like the way it feels when in place. Able to remove and place on a bimonthly basis       Ohs Peq Urogyn Hpi     Question 2019  9:25 PM CDT - Filed by Patient on 2019   General  "Urogynecology: Are you experiencing the following?    Dysuria (painful urination) No   Nocturia:  waking up at night to empty your bladder  Yes   If you answered yes to the previous question, how many times does this happen per night? 3-4   Enuresis (urine loss during sleep) No   Dribbling urine after you urinate No   Hematuria (urine appears red) No   Type of stream Weak   Urinary frequency: How often a day are you going to the bathroom per day?  10-15   Urinary Tract Infections: How many Urinary Tract Infections have you had in the past year? I have not had a UTI in the past year   If you have had a UTI in the past year, what treatments have you had so far?  I have not had a UTI in the past year   Urinary Incontinence (General): Are you experiencing the following?    Past consultation for incontinence: Have you ever seen someone for the evaluation of incontinence? No   If you answered yes to the previous question, please select all the therapies you have tried.  N/a- I answered no to the previous question   Please note the effectiveness of the therapies.    Need to wear protection to keep clothes dry  Yes   If you answered yes to the previous question, please basilia the protection you use.  Panty liner   If you wear protection, how much wetness is typically on each pad? Slight   If you wear protection, how often do you have to change per day, if applicable?  2   Stress Symptoms: Are you experiencing the following?    Leakage of urine with cough, laugh and/or sneeze No   If you answered yes to the previous question, what is the frequency in days, weeks and/or months? Never   Leakage of urine with sex No   Leakage of urine with bending/ lifting No   Leakage of urine with briskly walking or jogging No   If you lose urine for any other reason not previously mentioned, please note it below, if applicable.  n/a   Urge Symptoms: Are you experiencing the following?    Urgency ("got to go" feeling) Yes   Urge: How frequently " "do you feel an urge to urinate (feeling like you "gotta go" to the bathroom and can't wait) Several times a day   Do you experience a leakage of urine when you have a feeling of urgency?  Yes   Leakage of urine when unaware No   Past use of anticholinergics (medications used to treat overactive bladder) No   If you answered yes to the previous question, please basilia the anticholinergics you have used:     Have you ever used Mirbetriq (aka Mirabegron)?  No   Prolapse Symptoms: Are you experiencing any of the following?     Falling out/ Bulging/ Heaviness in the vagina No   Vaginal/ Abdominal Pain/ Pressure No   Need to strain/ Push to void No   Need to wait on the toilet before you void No   Unusual position to urinate (using your hands to push back the vaginal bulge) No   Sensation of incomplete emptying Yes   Past use of pessary device No   If you answered yes to the previous question, please list the devices you have used below.  n/a   Bowel Symptoms: Are you experiencing any of the following?    Constipation No   Diarrhea  No   Hematochezia (bloody stool) No   Incomplete evacuation of stool No   Involuntary loss of formed stool No   Fecal smearing/urgency Yes   Involuntary loss of gas Yes   Vaginal Symptoms: Are you experiencing any of the following?     Abnormal vaginal bleeding  No   Vaginal dryness No   Sexually active  No   Dyspareunia (painful intercourse) No   Estrogen use  No       GYN & OB History  No LMP recorded.   Date of Last Pap: No result found    OB History    Para Term  AB Living   1 1 1     1   SAB TAB Ectopic Multiple Live Births         0 1      # Outcome Date GA Lbr Anurag/2nd Weight Sex Delivery Anes PTL Lv   1 Term 19 37w3d  2.8 kg (6 lb 2.8 oz) M Vag-Spont EPI N GILLIAN      Complications: Chorioamnionitis     OB  Largest: 6#  Forceps:  Episiotomy: 2nd degree    GYN  Menarche: 15  Menstrual cycle: nomral  Menopause: amenorrhea prior to pregnancy   Hysterectomy: no  Ovaries: " present       Review of Systems  Review of Systems   Constitutional: Negative for activity change, appetite change, chills, diaphoresis, fatigue, fever and unexpected weight change.   Respiratory: Negative for cough.    Gastrointestinal: Negative for abdominal distention, abdominal pain, anal bleeding, blood in stool, constipation, diarrhea, nausea, rectal pain and vomiting.   Genitourinary: Negative for decreased urine volume, difficulty urinating, dyspareunia, dysuria, enuresis, flank pain, frequency, genital sores, hematuria, menstrual problem, pelvic pain, urgency, vaginal bleeding, vaginal discharge and vaginal pain.   Musculoskeletal: Negative for back pain.   Skin: Negative for color change, pallor, rash and wound.   Allergic/Immunologic: Negative for environmental allergies, food allergies and immunocompromised state.   Hematological: Negative for adenopathy. Does not bruise/bleed easily.   Psychiatric/Behavioral: Negative for agitation, behavioral problems, confusion and sleep disturbance.           Objective:     Physical Exam   Constitutional: She is oriented to person, place, and time. She appears well-developed and well-nourished.   HENT:   Head: Normocephalic and atraumatic.   Neck: Normal range of motion. Neck supple.   Pulmonary/Chest: No respiratory distress. She has no wheezes.   Musculoskeletal: Normal range of motion.   Neurological: She is alert and oriented to person, place, and time.   Skin: No cyanosis. Nails show no clubbing.   Psychiatric: She has a normal mood and affect. Her behavior is normal. Judgment and thought content normal.             HCM  Pap's: normal   Mammo: normal 2018  Colonoscopy more than 10 years, normal   Dexa: n/a    Assessment:        1. Uterovaginal prolapse, incomplete    2. Prolapse of anterior vaginal wall    3. Mixed urinary incontinence due to female genital prolapse             Plan:           1. Fecal incontinence/ incomplete bowel emptying likely secondary to  outlet obstruction given her rectocel: over all improved   --Fiber may better control cholesterol and blood glucose.  Start daily fiber.  Take 1 tsp of fiber powder (psyllium or other sugar-free powder).  Mix in 8 oz of water.  Take x 3-5 days.  Then, increase fiber by 1 tsp every 3-5 days until stool is easy to pass.  ( Metamucil, benefiber )   --Keep a bowel diary   -- consider Lomotil in the future  --Pelvic floor PT working with Chaparrita Sethi   --Endoanal sonogram to evaluate the sphincter   --Anal manometry   --We discussed treatment option including Pelvic floor PT, SNS, and Sphincteroplasty. Risks, benefits reviewed in detail. She has worked with Lexii, with mild improvement, [facility]   -- Colorectal evaluation     2.  Mixed urinary incontinence, urge > stress: now urge incontinence more bothersome    --Empty bladder every 3 hours.  Empty well: wait a minute, lean forward on toilet.    --Avoid dietary irritants (see sheet).  Keep diary x 3-5 days to determine your irritants.  --KEGELS: do 10 in AM and 10 in PM, holding each x 10 seconds.  When you feel urge to go, STOP, KEGEL, and when urge has passed, then go to bathroom.  Consider PT in future.    --URGE: improved,  No need for medications at this point. Currently breast feeding.   --STRESS:  Pessary vs. Sling vs impressa ( stress incontinence made worse with the pessary in place. )    3. Prolapse: Stage 2 anterior and posterior vaginal wall prolapse from prior visit.   --#2 1/2 STG, feels like the vagina rides over it. Trial of  # 6 IC ring with support.   --Pessary in placed however she continues to have difficulty, likely will need a larger sized pessary  --Daily pelvic floor exercises as assigned,  --Non surgical options discussed with patient    --Surgical options also discussed, she's more interested in the non-surgical treatment options for now.     Approximately 20 min were spent in consult,  90 % in discussion.     Laury Casillas DO  Female Pelvic Medicine and Reconstructive Surgery  [facility] [place], [place]

## 2020-09-16 ENCOUNTER — PATIENT MESSAGE (OUTPATIENT)
Dept: OBSTETRICS AND GYNECOLOGY | Facility: CLINIC | Age: 50
End: 2020-09-16

## 2020-10-16 ENCOUNTER — OFFICE VISIT (OUTPATIENT)
Dept: OBSTETRICS AND GYNECOLOGY | Facility: CLINIC | Age: 50
End: 2020-10-16
Payer: COMMERCIAL

## 2020-10-16 VITALS
DIASTOLIC BLOOD PRESSURE: 70 MMHG | WEIGHT: 147.5 LBS | SYSTOLIC BLOOD PRESSURE: 110 MMHG | BODY MASS INDEX: 22.35 KG/M2 | HEIGHT: 68 IN

## 2020-10-16 DIAGNOSIS — Z01.419 WELL WOMAN EXAM WITH ROUTINE GYNECOLOGICAL EXAM: Primary | ICD-10-CM

## 2020-10-16 DIAGNOSIS — Z12.39 ENCOUNTER FOR SCREENING FOR MALIGNANT NEOPLASM OF BREAST, UNSPECIFIED SCREENING MODALITY: ICD-10-CM

## 2020-10-16 PROCEDURE — 99999 PR PBB SHADOW E&M-EST. PATIENT-LVL III: ICD-10-PCS | Mod: PBBFAC,,, | Performed by: OBSTETRICS & GYNECOLOGY

## 2020-10-16 PROCEDURE — 3008F PR BODY MASS INDEX (BMI) DOCUMENTED: ICD-10-PCS | Mod: CPTII,S$GLB,, | Performed by: OBSTETRICS & GYNECOLOGY

## 2020-10-16 PROCEDURE — 3008F BODY MASS INDEX DOCD: CPT | Mod: CPTII,S$GLB,, | Performed by: OBSTETRICS & GYNECOLOGY

## 2020-10-16 PROCEDURE — 99999 PR PBB SHADOW E&M-EST. PATIENT-LVL III: CPT | Mod: PBBFAC,,, | Performed by: OBSTETRICS & GYNECOLOGY

## 2020-10-16 PROCEDURE — 99396 PR PREVENTIVE VISIT,EST,40-64: ICD-10-PCS | Mod: S$GLB,,, | Performed by: OBSTETRICS & GYNECOLOGY

## 2020-10-16 PROCEDURE — 99396 PREV VISIT EST AGE 40-64: CPT | Mod: S$GLB,,, | Performed by: OBSTETRICS & GYNECOLOGY

## 2020-10-16 NOTE — PROGRESS NOTES
Subjective:       Patient ID: Dipti Pacheco is a 50 y.o. female.    Chief Complaint:  Annual Exam      History of Present Illness  HPI  SUBJECTIVE:   50 y.o. female  here for annual. She stopped breastfeeding nearly 3 months ago. She had her first menstrual cycle after delivery in Sept. She is seeing UroGyn and Pelvic Floor PT for POP.     denies break through bleeding.   denies vaginal itching or irritation.  denies vaginal discharge.    She is not sexually active.  She uses no method for contraception.    History of abnormal pap: No  Last Pap: 2020 - NILM/HPV  Last MMG: 10/2018 - Birads 1. She stopped breastfeeding nearly 3 months ago and so will schedule in 3 months.  Last Colonoscopy: 2020 - normal  Last DXA: 2019     FH: Mother breast cancer age 71. Denies family history of GYN or colon cancer.    GYN & OB History  Patient's last menstrual period was 2020.   Date of Last Pap: No result found    OB History    Para Term  AB Living   1 1 1     1   SAB TAB Ectopic Multiple Live Births         0 1      # Outcome Date GA Lbr Anurag/2nd Weight Sex Delivery Anes PTL Lv   1 Term 19 37w3d  2.8 kg (6 lb 2.8 oz) M Vag-Spont EPI N GILLIAN      Complications: Chorioamnionitis       Review of Systems  Review of Systems   Constitutional: Negative for chills, diaphoresis, fatigue and fever.   Respiratory: Negative for cough and shortness of breath.    Cardiovascular: Negative for chest pain and palpitations.   Gastrointestinal: Negative for abdominal pain, constipation, diarrhea, nausea and vomiting.   Genitourinary: Negative for dysmenorrhea, dysuria, frequency, menorrhagia, menstrual problem, pelvic pain, vaginal bleeding, vaginal discharge and vaginal pain.   Musculoskeletal: Negative for back pain and myalgias.   Integumentary:  Negative for rash, acne, breast mass, nipple discharge and breast skin changes.   Neurological: Negative for headaches.   Psychiatric/Behavioral:  Negative for depression. The patient is not nervous/anxious.    Breast: Negative for mass, mastodynia, nipple discharge and skin changes          Objective:    Physical Exam:   Constitutional: She is oriented to person, place, and time. She appears well-developed and well-nourished. No distress.    HENT:   Head: Normocephalic and atraumatic.    Eyes: EOM are normal.    Neck: Normal range of motion. No thyromegaly present.     Pulmonary/Chest: Effort normal. She exhibits no mass and no tenderness. Right breast exhibits no inverted nipple, no mass, no nipple discharge, no skin change, no tenderness and no swelling. Left breast exhibits no inverted nipple, no mass, no nipple discharge, no skin change, no tenderness and no swelling. Breasts are symmetrical.        Abdominal: Soft. She exhibits no distension and no mass. There is no abdominal tenderness. There is no rebound and no guarding. No hernia.     Genitourinary:    Genitourinary Comments: Normal external female genitalia; vagina rugated, normal; cervix normal, no masses; uterus small mobile nontender; no adnexal masses palpated; rectal deferred               Musculoskeletal: Normal range of motion and moves all extremeties.       Neurological: She is alert and oriented to person, place, and time.    Skin: Skin is warm. No rash noted.    Psychiatric: She has a normal mood and affect. Her behavior is normal. Judgment and thought content normal.          Assessment:        1. Well woman exam with routine gynecological exam    2. Encounter for screening for malignant neoplasm of breast, unspecified screening modality                Plan:      Dipti was seen today for annual exam.    Diagnoses and all orders for this visit:    Well woman exam with routine gynecological exam  - Pap guidelines discussed. Pap up to date.  - MMG ordered.   - Cscope up to date.  - DXA up to date.   - Contraception - Patient declined.   - STD screening - Patient declined.      Encounter  for screening for malignant neoplasm of breast, unspecified screening modality  -     Mammo Digital Screening Bilat w/ Karri; Future        Orders Placed This Encounter   Procedures    Mammo Digital Screening Bilat w/ Karri       Follow up in about 1 year (around 10/16/2021) for annual.

## 2020-12-08 ENCOUNTER — OFFICE VISIT (OUTPATIENT)
Dept: UROGYNECOLOGY | Facility: CLINIC | Age: 50
End: 2020-12-08
Payer: COMMERCIAL

## 2020-12-08 ENCOUNTER — PATIENT MESSAGE (OUTPATIENT)
Dept: UROGYNECOLOGY | Facility: CLINIC | Age: 50
End: 2020-12-08

## 2020-12-08 VITALS
BODY MASS INDEX: 22.72 KG/M2 | WEIGHT: 149.94 LBS | HEART RATE: 60 BPM | SYSTOLIC BLOOD PRESSURE: 112 MMHG | HEIGHT: 68 IN | DIASTOLIC BLOOD PRESSURE: 74 MMHG

## 2020-12-08 DIAGNOSIS — N81.10 PROLAPSE OF ANTERIOR VAGINAL WALL: Primary | ICD-10-CM

## 2020-12-08 DIAGNOSIS — N81.2 UTEROVAGINAL PROLAPSE, INCOMPLETE: ICD-10-CM

## 2020-12-08 PROCEDURE — 3008F PR BODY MASS INDEX (BMI) DOCUMENTED: ICD-10-PCS | Mod: CPTII,S$GLB,, | Performed by: OBSTETRICS & GYNECOLOGY

## 2020-12-08 PROCEDURE — 1126F PR PAIN SEVERITY QUANTIFIED, NO PAIN PRESENT: ICD-10-PCS | Mod: S$GLB,,, | Performed by: OBSTETRICS & GYNECOLOGY

## 2020-12-08 PROCEDURE — 3008F BODY MASS INDEX DOCD: CPT | Mod: CPTII,S$GLB,, | Performed by: OBSTETRICS & GYNECOLOGY

## 2020-12-08 PROCEDURE — 99213 PR OFFICE/OUTPT VISIT, EST, LEVL III, 20-29 MIN: ICD-10-PCS | Mod: S$GLB,,, | Performed by: OBSTETRICS & GYNECOLOGY

## 2020-12-08 PROCEDURE — 99213 OFFICE O/P EST LOW 20 MIN: CPT | Mod: S$GLB,,, | Performed by: OBSTETRICS & GYNECOLOGY

## 2020-12-08 PROCEDURE — 99999 PR PBB SHADOW E&M-EST. PATIENT-LVL IV: CPT | Mod: PBBFAC,,, | Performed by: OBSTETRICS & GYNECOLOGY

## 2020-12-08 PROCEDURE — 99999 PR PBB SHADOW E&M-EST. PATIENT-LVL IV: ICD-10-PCS | Mod: PBBFAC,,, | Performed by: OBSTETRICS & GYNECOLOGY

## 2020-12-08 PROCEDURE — 1126F AMNT PAIN NOTED NONE PRSNT: CPT | Mod: S$GLB,,, | Performed by: OBSTETRICS & GYNECOLOGY

## 2020-12-08 RX ORDER — SOLIFENACIN SUCCINATE 5 MG/1
5 TABLET, FILM COATED ORAL DAILY
Qty: 30 TABLET | Refills: 11 | Status: SHIPPED | OUTPATIENT
Start: 2020-12-08 | End: 2021-05-20

## 2020-12-14 ENCOUNTER — HOSPITAL ENCOUNTER (OUTPATIENT)
Dept: RADIOLOGY | Facility: CLINIC | Age: 50
Discharge: HOME | End: 2020-12-14
Attending: INTERNAL MEDICINE
Payer: COMMERCIAL

## 2020-12-14 DIAGNOSIS — M85.80 LOW BONE MASS: ICD-10-CM

## 2020-12-14 PROCEDURE — 77080 DEXA BONE DENSITY SPINE HIP: ICD-10-PCS | Mod: 26,,, | Performed by: INTERNAL MEDICINE

## 2020-12-14 PROCEDURE — 77080 DXA BONE DENSITY AXIAL: CPT | Mod: TC

## 2020-12-14 PROCEDURE — 77080 DXA BONE DENSITY AXIAL: CPT | Mod: 26,,, | Performed by: INTERNAL MEDICINE

## 2020-12-23 ENCOUNTER — TELEPHONE (OUTPATIENT)
Dept: ENDOCRINOLOGY | Facility: CLINIC | Age: 50
End: 2020-12-23

## 2020-12-23 NOTE — TELEPHONE ENCOUNTER
----- Message from Arslan Ramos sent at 12/23/2020  9:22 AM CST -----  Regarding: Lab Results  Pt called requesting lab results from 12-14-20 . Unable to access via portal     [phone] (home)

## 2020-12-29 ENCOUNTER — PATIENT MESSAGE (OUTPATIENT)
Dept: ENDOCRINOLOGY | Facility: CLINIC | Age: 50
End: 2020-12-29

## 2021-02-17 ENCOUNTER — PATIENT MESSAGE (OUTPATIENT)
Dept: UROGYNECOLOGY | Facility: CLINIC | Age: 51
End: 2021-02-17

## 2021-03-02 ENCOUNTER — OFFICE VISIT (OUTPATIENT)
Dept: OPTOMETRY | Facility: CLINIC | Age: 51
End: 2021-03-02
Payer: COMMERCIAL

## 2021-03-02 DIAGNOSIS — Z04.9 DISEASE RULED OUT AFTER EXAMINATION: ICD-10-CM

## 2021-03-02 DIAGNOSIS — H47.393 OPTIC NERVE CUPPING OF BOTH EYES: Primary | ICD-10-CM

## 2021-03-02 DIAGNOSIS — H52.4 PRESBYOPIA: ICD-10-CM

## 2021-03-02 PROCEDURE — 1126F AMNT PAIN NOTED NONE PRSNT: CPT | Mod: S$GLB,,, | Performed by: OPTOMETRIST

## 2021-03-02 PROCEDURE — 1126F PR PAIN SEVERITY QUANTIFIED, NO PAIN PRESENT: ICD-10-PCS | Mod: S$GLB,,, | Performed by: OPTOMETRIST

## 2021-03-02 PROCEDURE — 99999 PR PBB SHADOW E&M-EST. PATIENT-LVL II: CPT | Mod: PBBFAC,,, | Performed by: OPTOMETRIST

## 2021-03-02 PROCEDURE — 92014 COMPRE OPH EXAM EST PT 1/>: CPT | Mod: S$GLB,,, | Performed by: OPTOMETRIST

## 2021-03-02 PROCEDURE — 99999 PR PBB SHADOW E&M-EST. PATIENT-LVL II: ICD-10-PCS | Mod: PBBFAC,,, | Performed by: OPTOMETRIST

## 2021-03-02 PROCEDURE — 92014 PR EYE EXAM, EST PATIENT,COMPREHESV: ICD-10-PCS | Mod: S$GLB,,, | Performed by: OPTOMETRIST

## 2021-03-02 PROCEDURE — 92133 POSTERIOR SEGMENT OCT OPTIC NERVE(OCULAR COHERENCE TOMOGRAPHY) - OU - BOTH EYES: ICD-10-PCS | Mod: S$GLB,,, | Performed by: OPTOMETRIST

## 2021-03-02 PROCEDURE — 92133 CPTRZD OPH DX IMG PST SGM ON: CPT | Mod: S$GLB,,, | Performed by: OPTOMETRIST

## 2021-03-19 ENCOUNTER — HOSPITAL ENCOUNTER (OUTPATIENT)
Dept: RADIOLOGY | Facility: OTHER | Age: 51
Discharge: HOME | End: 2021-03-19
Attending: OBSTETRICS & GYNECOLOGY
Payer: COMMERCIAL

## 2021-03-19 DIAGNOSIS — Z12.31 BREAST CANCER SCREENING BY MAMMOGRAM: ICD-10-CM

## 2021-03-19 DIAGNOSIS — Z12.39 ENCOUNTER FOR SCREENING FOR MALIGNANT NEOPLASM OF BREAST, UNSPECIFIED SCREENING MODALITY: ICD-10-CM

## 2021-03-19 PROCEDURE — 77067 SCR MAMMO BI INCL CAD: CPT | Mod: 26,,, | Performed by: RADIOLOGY

## 2021-03-19 PROCEDURE — 77063 BREAST TOMOSYNTHESIS BI: CPT | Mod: 26,,, | Performed by: RADIOLOGY

## 2021-03-19 PROCEDURE — 77063 MAMMO DIGITAL SCREENING BILAT WITH TOMO: ICD-10-PCS | Mod: 26,,, | Performed by: RADIOLOGY

## 2021-03-19 PROCEDURE — 77067 SCR MAMMO BI INCL CAD: CPT | Mod: TC

## 2021-03-19 PROCEDURE — 77067 MAMMO DIGITAL SCREENING BILAT WITH TOMO: ICD-10-PCS | Mod: 26,,, | Performed by: RADIOLOGY

## 2021-03-22 ENCOUNTER — PATIENT MESSAGE (OUTPATIENT)
Dept: OBSTETRICS AND GYNECOLOGY | Facility: CLINIC | Age: 51
End: 2021-03-22

## 2021-03-24 ENCOUNTER — PATIENT MESSAGE (OUTPATIENT)
Dept: OBSTETRICS AND GYNECOLOGY | Facility: CLINIC | Age: 51
End: 2021-03-24

## 2021-03-24 DIAGNOSIS — Z12.31 ENCOUNTER FOR SCREENING MAMMOGRAM FOR MALIGNANT NEOPLASM OF BREAST: Primary | ICD-10-CM

## 2021-03-25 ENCOUNTER — TELEPHONE (OUTPATIENT)
Dept: OBSTETRICS AND GYNECOLOGY | Facility: CLINIC | Age: 51
End: 2021-03-25

## 2021-03-29 ENCOUNTER — HOSPITAL ENCOUNTER (OUTPATIENT)
Dept: RADIOLOGY | Facility: OTHER | Age: 51
Discharge: HOME | End: 2021-03-29
Attending: OBSTETRICS & GYNECOLOGY
Payer: COMMERCIAL

## 2021-03-29 DIAGNOSIS — R92.8 ABNORMAL MAMMOGRAM: ICD-10-CM

## 2021-03-29 PROCEDURE — 77061 MAMMO DIGITAL DIAGNOSTIC LEFT WITH TOMO: ICD-10-PCS | Mod: 26,LT,, | Performed by: RADIOLOGY

## 2021-03-29 PROCEDURE — 77061 BREAST TOMOSYNTHESIS UNI: CPT | Mod: 26,LT,, | Performed by: RADIOLOGY

## 2021-03-29 PROCEDURE — 77061 BREAST TOMOSYNTHESIS UNI: CPT | Mod: TC,LT

## 2021-03-29 PROCEDURE — 77065 MAMMO DIGITAL DIAGNOSTIC LEFT WITH TOMO: ICD-10-PCS | Mod: 26,LT,, | Performed by: RADIOLOGY

## 2021-03-29 PROCEDURE — 77065 DX MAMMO INCL CAD UNI: CPT | Mod: 26,LT,, | Performed by: RADIOLOGY

## 2021-05-20 ENCOUNTER — OFFICE VISIT (OUTPATIENT)
Dept: UROGYNECOLOGY | Facility: CLINIC | Age: 51
End: 2021-05-20
Payer: COMMERCIAL

## 2021-05-20 ENCOUNTER — LAB VISIT (OUTPATIENT)
Dept: LAB | Facility: OTHER | Age: 51
End: 2021-05-20
Payer: COMMERCIAL

## 2021-05-20 ENCOUNTER — PATIENT MESSAGE (OUTPATIENT)
Dept: UROGYNECOLOGY | Facility: CLINIC | Age: 51
End: 2021-05-20

## 2021-05-20 VITALS
WEIGHT: 152 LBS | SYSTOLIC BLOOD PRESSURE: 109 MMHG | HEIGHT: 68 IN | BODY MASS INDEX: 23.04 KG/M2 | DIASTOLIC BLOOD PRESSURE: 64 MMHG | HEART RATE: 72 BPM

## 2021-05-20 DIAGNOSIS — N92.6 MENSTRUAL ABNORMALITY: ICD-10-CM

## 2021-05-20 DIAGNOSIS — N39.41 URGE INCONTINENCE: Primary | ICD-10-CM

## 2021-05-20 DIAGNOSIS — N92.6 ABNORMAL MENSTRUATION: Primary | ICD-10-CM

## 2021-05-20 DIAGNOSIS — N81.10 PROLAPSE OF ANTERIOR VAGINAL WALL: ICD-10-CM

## 2021-05-20 LAB
FSH SERPL-ACNC: 14.2 MIU/ML
LH SERPL-ACNC: 7.3 MIU/ML
TSH SERPL DL<=0.005 MIU/L-ACNC: 3.28 UIU/ML (ref 0.4–4)

## 2021-05-20 PROCEDURE — 1126F PR PAIN SEVERITY QUANTIFIED, NO PAIN PRESENT: ICD-10-PCS | Mod: S$GLB,,, | Performed by: OBSTETRICS & GYNECOLOGY

## 2021-05-20 PROCEDURE — 99213 PR OFFICE/OUTPT VISIT, EST, LEVL III, 20-29 MIN: ICD-10-PCS | Mod: S$GLB,,, | Performed by: OBSTETRICS & GYNECOLOGY

## 2021-05-20 PROCEDURE — 83002 ASSAY OF GONADOTROPIN (LH): CPT | Performed by: OBSTETRICS & GYNECOLOGY

## 2021-05-20 PROCEDURE — 1126F AMNT PAIN NOTED NONE PRSNT: CPT | Mod: S$GLB,,, | Performed by: OBSTETRICS & GYNECOLOGY

## 2021-05-20 PROCEDURE — 83001 ASSAY OF GONADOTROPIN (FSH): CPT | Performed by: OBSTETRICS & GYNECOLOGY

## 2021-05-20 PROCEDURE — 99999 PR PBB SHADOW E&M-EST. PATIENT-LVL III: ICD-10-PCS | Mod: PBBFAC,,, | Performed by: OBSTETRICS & GYNECOLOGY

## 2021-05-20 PROCEDURE — 3008F PR BODY MASS INDEX (BMI) DOCUMENTED: ICD-10-PCS | Mod: CPTII,S$GLB,, | Performed by: OBSTETRICS & GYNECOLOGY

## 2021-05-20 PROCEDURE — 99213 OFFICE O/P EST LOW 20 MIN: CPT | Mod: S$GLB,,, | Performed by: OBSTETRICS & GYNECOLOGY

## 2021-05-20 PROCEDURE — 36415 COLL VENOUS BLD VENIPUNCTURE: CPT | Performed by: OBSTETRICS & GYNECOLOGY

## 2021-05-20 PROCEDURE — 99999 PR PBB SHADOW E&M-EST. PATIENT-LVL III: CPT | Mod: PBBFAC,,, | Performed by: OBSTETRICS & GYNECOLOGY

## 2021-05-20 PROCEDURE — 84443 ASSAY THYROID STIM HORMONE: CPT | Performed by: OBSTETRICS & GYNECOLOGY

## 2021-05-20 PROCEDURE — 3008F BODY MASS INDEX DOCD: CPT | Mod: CPTII,S$GLB,, | Performed by: OBSTETRICS & GYNECOLOGY

## 2021-05-20 RX ORDER — SOLIFENACIN SUCCINATE 10 MG/1
10 TABLET, FILM COATED ORAL DAILY
Qty: 30 TABLET | Refills: 11 | Status: SHIPPED | OUTPATIENT
Start: 2021-05-20 | End: 2022-05-17 | Stop reason: SDUPTHER

## 2021-05-21 ENCOUNTER — PATIENT MESSAGE (OUTPATIENT)
Dept: UROGYNECOLOGY | Facility: CLINIC | Age: 51
End: 2021-05-21

## 2021-06-02 PROBLEM — N39.41 URGE INCONTINENCE: Status: ACTIVE | Noted: 2021-06-02

## 2021-06-02 PROBLEM — N81.10 PROLAPSE OF ANTERIOR VAGINAL WALL: Status: ACTIVE | Noted: 2021-06-02

## 2021-07-20 ENCOUNTER — TELEPHONE (OUTPATIENT)
Dept: UROGYNECOLOGY | Facility: CLINIC | Age: 51
End: 2021-07-20

## 2021-10-25 ENCOUNTER — PATIENT MESSAGE (OUTPATIENT)
Dept: ENDOCRINOLOGY | Facility: CLINIC | Age: 51
End: 2021-10-25
Payer: COMMERCIAL

## 2021-10-26 ENCOUNTER — TELEPHONE (OUTPATIENT)
Dept: ENDOCRINOLOGY | Facility: CLINIC | Age: 51
End: 2021-10-26
Payer: COMMERCIAL

## 2021-10-26 DIAGNOSIS — M85.80 LOW BONE MASS: Primary | ICD-10-CM

## 2021-10-29 ENCOUNTER — OFFICE VISIT (OUTPATIENT)
Dept: OBSTETRICS AND GYNECOLOGY | Facility: CLINIC | Age: 51
End: 2021-10-29
Payer: COMMERCIAL

## 2021-10-29 VITALS
WEIGHT: 149.94 LBS | SYSTOLIC BLOOD PRESSURE: 123 MMHG | DIASTOLIC BLOOD PRESSURE: 74 MMHG | HEIGHT: 68 IN | BODY MASS INDEX: 22.72 KG/M2

## 2021-10-29 DIAGNOSIS — Z01.419 WELL WOMAN EXAM WITH ROUTINE GYNECOLOGICAL EXAM: Primary | ICD-10-CM

## 2021-10-29 DIAGNOSIS — N95.2 VAGINAL ATROPHY: ICD-10-CM

## 2021-10-29 PROCEDURE — 3078F PR MOST RECENT DIASTOLIC BLOOD PRESSURE < 80 MM HG: ICD-10-PCS | Mod: CPTII,S$GLB,, | Performed by: OBSTETRICS & GYNECOLOGY

## 2021-10-29 PROCEDURE — 3078F DIAST BP <80 MM HG: CPT | Mod: CPTII,S$GLB,, | Performed by: OBSTETRICS & GYNECOLOGY

## 2021-10-29 PROCEDURE — 99999 PR PBB SHADOW E&M-EST. PATIENT-LVL II: CPT | Mod: PBBFAC,,, | Performed by: OBSTETRICS & GYNECOLOGY

## 2021-10-29 PROCEDURE — 1160F PR REVIEW ALL MEDS BY PRESCRIBER/CLIN PHARMACIST DOCUMENTED: ICD-10-PCS | Mod: CPTII,S$GLB,, | Performed by: OBSTETRICS & GYNECOLOGY

## 2021-10-29 PROCEDURE — 3074F PR MOST RECENT SYSTOLIC BLOOD PRESSURE < 130 MM HG: ICD-10-PCS | Mod: CPTII,S$GLB,, | Performed by: OBSTETRICS & GYNECOLOGY

## 2021-10-29 PROCEDURE — 3008F BODY MASS INDEX DOCD: CPT | Mod: CPTII,S$GLB,, | Performed by: OBSTETRICS & GYNECOLOGY

## 2021-10-29 PROCEDURE — 1159F PR MEDICATION LIST DOCUMENTED IN MEDICAL RECORD: ICD-10-PCS | Mod: CPTII,S$GLB,, | Performed by: OBSTETRICS & GYNECOLOGY

## 2021-10-29 PROCEDURE — 1160F RVW MEDS BY RX/DR IN RCRD: CPT | Mod: CPTII,S$GLB,, | Performed by: OBSTETRICS & GYNECOLOGY

## 2021-10-29 PROCEDURE — 99396 PR PREVENTIVE VISIT,EST,40-64: ICD-10-PCS | Mod: S$GLB,,, | Performed by: OBSTETRICS & GYNECOLOGY

## 2021-10-29 PROCEDURE — 1159F MED LIST DOCD IN RCRD: CPT | Mod: CPTII,S$GLB,, | Performed by: OBSTETRICS & GYNECOLOGY

## 2021-10-29 PROCEDURE — 99999 PR PBB SHADOW E&M-EST. PATIENT-LVL II: ICD-10-PCS | Mod: PBBFAC,,, | Performed by: OBSTETRICS & GYNECOLOGY

## 2021-10-29 PROCEDURE — 3074F SYST BP LT 130 MM HG: CPT | Mod: CPTII,S$GLB,, | Performed by: OBSTETRICS & GYNECOLOGY

## 2021-10-29 PROCEDURE — 99396 PREV VISIT EST AGE 40-64: CPT | Mod: S$GLB,,, | Performed by: OBSTETRICS & GYNECOLOGY

## 2021-10-29 PROCEDURE — 3008F PR BODY MASS INDEX (BMI) DOCUMENTED: ICD-10-PCS | Mod: CPTII,S$GLB,, | Performed by: OBSTETRICS & GYNECOLOGY

## 2021-10-29 RX ORDER — CONJUGATED ESTROGENS 0.62 MG/G
CREAM VAGINAL
Qty: 45 G | Refills: 4 | Status: SHIPPED | OUTPATIENT
Start: 2021-10-29 | End: 2023-04-20 | Stop reason: SDUPTHER

## 2022-01-03 ENCOUNTER — TELEPHONE (OUTPATIENT)
Dept: ENDOCRINOLOGY | Facility: CLINIC | Age: 52
End: 2022-01-03
Payer: COMMERCIAL

## 2022-01-03 DIAGNOSIS — M85.89 OSTEOPENIA OF MULTIPLE SITES: Primary | ICD-10-CM

## 2022-01-03 NOTE — TELEPHONE ENCOUNTER
----- Message from Yolanda Luong sent at 1/3/2022 10:12 AM CST -----  Regarding: pt advice  Contact: pt@ [phone]  Pt asking to speak with someone in Dr. Emerson's office regarding a bone density test that was to be ordered and scheduled for her, no order is in epic. Please call.

## 2022-01-06 ENCOUNTER — PATIENT MESSAGE (OUTPATIENT)
Dept: UROGYNECOLOGY | Facility: CLINIC | Age: 52
End: 2022-01-06
Payer: COMMERCIAL

## 2022-01-10 ENCOUNTER — OFFICE VISIT (OUTPATIENT)
Dept: OPTOMETRY | Facility: CLINIC | Age: 52
End: 2022-01-10
Payer: COMMERCIAL

## 2022-01-10 ENCOUNTER — PATIENT MESSAGE (OUTPATIENT)
Dept: OPTOMETRY | Facility: CLINIC | Age: 52
End: 2022-01-10
Payer: COMMERCIAL

## 2022-01-10 DIAGNOSIS — Z04.9 DISEASE RULED OUT AFTER EXAMINATION: ICD-10-CM

## 2022-01-10 DIAGNOSIS — H52.4 PRESBYOPIA: ICD-10-CM

## 2022-01-10 DIAGNOSIS — H47.393 OPTIC NERVE CUPPING OF BOTH EYES: ICD-10-CM

## 2022-01-10 DIAGNOSIS — H43.393 VITREOUS FLOATERS, BILATERAL: Primary | ICD-10-CM

## 2022-01-10 PROCEDURE — 1160F RVW MEDS BY RX/DR IN RCRD: CPT | Mod: CPTII,S$GLB,, | Performed by: OPTOMETRIST

## 2022-01-10 PROCEDURE — 1160F PR REVIEW ALL MEDS BY PRESCRIBER/CLIN PHARMACIST DOCUMENTED: ICD-10-PCS | Mod: CPTII,S$GLB,, | Performed by: OPTOMETRIST

## 2022-01-10 PROCEDURE — 99999 PR PBB SHADOW E&M-EST. PATIENT-LVL III: ICD-10-PCS | Mod: PBBFAC,,, | Performed by: OPTOMETRIST

## 2022-01-10 PROCEDURE — 92014 COMPRE OPH EXAM EST PT 1/>: CPT | Mod: S$GLB,,, | Performed by: OPTOMETRIST

## 2022-01-10 PROCEDURE — 1159F MED LIST DOCD IN RCRD: CPT | Mod: CPTII,S$GLB,, | Performed by: OPTOMETRIST

## 2022-01-10 PROCEDURE — 92014 PR EYE EXAM, EST PATIENT,COMPREHESV: ICD-10-PCS | Mod: S$GLB,,, | Performed by: OPTOMETRIST

## 2022-01-10 PROCEDURE — 99999 PR PBB SHADOW E&M-EST. PATIENT-LVL III: CPT | Mod: PBBFAC,,, | Performed by: OPTOMETRIST

## 2022-01-10 PROCEDURE — 1159F PR MEDICATION LIST DOCUMENTED IN MEDICAL RECORD: ICD-10-PCS | Mod: CPTII,S$GLB,, | Performed by: OPTOMETRIST

## 2022-02-18 ENCOUNTER — TELEPHONE (OUTPATIENT)
Dept: UROGYNECOLOGY | Facility: CLINIC | Age: 52
End: 2022-02-18
Payer: COMMERCIAL

## 2022-02-18 NOTE — TELEPHONE ENCOUNTER
----- Message from Myla Obando MA sent at 2/18/2022  1:01 PM CST -----  Regarding: FW: Appt  Contact: TRINI LEVIN [[id number]]    ----- Message -----  From: Marcell Peña  Sent: 2/18/2022  12:39 PM CST  To: [facility] Staff  Subject: Appt                                             Name of Who is Calling: TRINI LEVIN [[id number]]      What is the request in detail: Would like to speak with staff in regards to appt rescheduling without her knowledge. Patient states she cannot make that day, refuse next appt in April, nor wishes to see NP at this time. Please advise      Can the clinic reply by [facility]: yes      [facility] [phone]

## 2022-02-18 NOTE — TELEPHONE ENCOUNTER
----- Message from Marcell Peña sent at 2/18/2022 12:38 PM CST -----  Regarding: Appt  Contact: TRINI LEVIN [[id number]]  Name of Who is Calling: TRINI LEVIN [[id number]]      What is the request in detail: Would like to speak with staff in regards to appt rescheduling without her knowledge. Patient states she cannot make that day, refuse next appt in April, nor wishes to see NP at this time. Please advise      Can the clinic reply by [facility]: yes      What Number to Call Back if not in [facility]: [phone]

## 2022-03-21 ENCOUNTER — HOSPITAL ENCOUNTER (OUTPATIENT)
Dept: RADIOLOGY | Facility: OTHER | Age: 52
Discharge: HOME | End: 2022-03-21
Attending: OBSTETRICS & GYNECOLOGY
Payer: COMMERCIAL

## 2022-03-21 DIAGNOSIS — Z12.31 ENCOUNTER FOR SCREENING MAMMOGRAM FOR MALIGNANT NEOPLASM OF BREAST: ICD-10-CM

## 2022-03-21 PROCEDURE — 77063 MAMMO DIGITAL SCREENING BILAT WITH TOMO: ICD-10-PCS | Mod: 26,,, | Performed by: RADIOLOGY

## 2022-03-21 PROCEDURE — 77067 SCR MAMMO BI INCL CAD: CPT | Mod: 26,,, | Performed by: RADIOLOGY

## 2022-03-21 PROCEDURE — 77063 BREAST TOMOSYNTHESIS BI: CPT | Mod: 26,,, | Performed by: RADIOLOGY

## 2022-03-21 PROCEDURE — 77063 BREAST TOMOSYNTHESIS BI: CPT | Mod: TC

## 2022-03-21 PROCEDURE — 77067 SCR MAMMO BI INCL CAD: CPT | Mod: TC

## 2022-03-21 PROCEDURE — 77067 MAMMO DIGITAL SCREENING BILAT WITH TOMO: ICD-10-PCS | Mod: 26,,, | Performed by: RADIOLOGY

## 2022-03-22 ENCOUNTER — PATIENT MESSAGE (OUTPATIENT)
Dept: OBSTETRICS AND GYNECOLOGY | Facility: CLINIC | Age: 52
End: 2022-03-22
Payer: COMMERCIAL

## 2022-03-22 DIAGNOSIS — Z91.89 AT HIGH RISK FOR BREAST CANCER: Primary | ICD-10-CM

## 2022-03-24 ENCOUNTER — OFFICE VISIT (OUTPATIENT)
Dept: UROGYNECOLOGY | Facility: CLINIC | Age: 52
End: 2022-03-24
Payer: COMMERCIAL

## 2022-03-24 VITALS
SYSTOLIC BLOOD PRESSURE: 117 MMHG | HEART RATE: 76 BPM | HEIGHT: 68 IN | DIASTOLIC BLOOD PRESSURE: 70 MMHG | WEIGHT: 148.13 LBS | BODY MASS INDEX: 22.45 KG/M2

## 2022-03-24 DIAGNOSIS — N81.6 POSTERIOR VAGINAL WALL PROLAPSE: Primary | ICD-10-CM

## 2022-03-24 DIAGNOSIS — N81.10 PROLAPSE OF ANTERIOR VAGINAL WALL: ICD-10-CM

## 2022-03-24 PROCEDURE — 3078F DIAST BP <80 MM HG: CPT | Mod: CPTII,S$GLB,, | Performed by: OBSTETRICS & GYNECOLOGY

## 2022-03-24 PROCEDURE — 99999 PR PBB SHADOW E&M-EST. PATIENT-LVL IV: ICD-10-PCS | Mod: PBBFAC,,, | Performed by: OBSTETRICS & GYNECOLOGY

## 2022-03-24 PROCEDURE — 3008F PR BODY MASS INDEX (BMI) DOCUMENTED: ICD-10-PCS | Mod: CPTII,S$GLB,, | Performed by: OBSTETRICS & GYNECOLOGY

## 2022-03-24 PROCEDURE — 99213 PR OFFICE/OUTPT VISIT, EST, LEVL III, 20-29 MIN: ICD-10-PCS | Mod: S$GLB,,, | Performed by: OBSTETRICS & GYNECOLOGY

## 2022-03-24 PROCEDURE — 3008F BODY MASS INDEX DOCD: CPT | Mod: CPTII,S$GLB,, | Performed by: OBSTETRICS & GYNECOLOGY

## 2022-03-24 PROCEDURE — 1159F PR MEDICATION LIST DOCUMENTED IN MEDICAL RECORD: ICD-10-PCS | Mod: CPTII,S$GLB,, | Performed by: OBSTETRICS & GYNECOLOGY

## 2022-03-24 PROCEDURE — 1159F MED LIST DOCD IN RCRD: CPT | Mod: CPTII,S$GLB,, | Performed by: OBSTETRICS & GYNECOLOGY

## 2022-03-24 PROCEDURE — 99999 PR PBB SHADOW E&M-EST. PATIENT-LVL IV: CPT | Mod: PBBFAC,,, | Performed by: OBSTETRICS & GYNECOLOGY

## 2022-03-24 PROCEDURE — 3078F PR MOST RECENT DIASTOLIC BLOOD PRESSURE < 80 MM HG: ICD-10-PCS | Mod: CPTII,S$GLB,, | Performed by: OBSTETRICS & GYNECOLOGY

## 2022-03-24 PROCEDURE — 3074F PR MOST RECENT SYSTOLIC BLOOD PRESSURE < 130 MM HG: ICD-10-PCS | Mod: CPTII,S$GLB,, | Performed by: OBSTETRICS & GYNECOLOGY

## 2022-03-24 PROCEDURE — 99213 OFFICE O/P EST LOW 20 MIN: CPT | Mod: S$GLB,,, | Performed by: OBSTETRICS & GYNECOLOGY

## 2022-03-24 PROCEDURE — 3074F SYST BP LT 130 MM HG: CPT | Mod: CPTII,S$GLB,, | Performed by: OBSTETRICS & GYNECOLOGY

## 2022-03-25 NOTE — PROGRESS NOTES
Subjective:       Patient ID: Dipti Pacheco is a 51 y.o. female.    Chief Complaint:  Follow-up        History of Present Illness  Follow-up  Pertinent negatives include no abdominal pain, chills, coughing, diaphoresis, fatigue, fever, nausea, rash or vomiting.    51 Y O F P1   has a past medical history of Allergy, Anxiety, Disorder of muscle (2019), Iron deficiency anemia secondary to inadequate dietary iron intake (2019), and Subclinical hypothyroidism. referred by NP Kristin Wallace for pelvic organ prolapse and fecal incompetence. She began in after She began Kegel exercise early on but stopped and has now restarted. She had a induced due to non fetal heart tracing which lead to an pulido balloon induction of labor at 37 wks IVF pregnancy. She had a  approximately 38 hours in labor no forceps no vacuum. Immediately after the delivery there was very little sensation of the pelvis. She denies previous episodes of fecal incontinence, since the delivery it ranges from daily to every others day. The amount varies from a small amount to large amounts especially more bothersome when it's more loose.      Interval follow up:   Continues to perform pelvic floor PT on her own feels like her symptoms have improved.     Interval  HP since the last visit   1)  UI:  (--) FLAQUITO  (+) UUI  - at night if bladder too full.  (--) pads:.  Daytime frequency: Q 4 -5 hours.  Nocturia: No:    (-) dysuria-  (--) hematuria,  (--) frequent UTIs.  (+) complete bladder emptying.     2)  POP:   Symptoms:(+) : prolapse managed with  the pessary.  (--) vaginal bleeding. (--) vaginal discharge. (-) sexually active.  (--) dyspareunia.   (--)  Vaginal dryness.  (--) vaginal estrogen use.     3)  BM:  (+) constipation/straining.  (--) chronic diarrhea. (--) hematochezia.  (--) fecal incontinence.  (--) fecal smearing/urgency.  (--) incomplete evacuation.      4)Pessary- denies pain,bleeding or current discharge. Using # 6 IC ring    does not always like the way it feels when in place. Able to remove and place on a bimonthly basis     3/24/2022:  Doing well continues to work with [employer]  Using the pessary as needed  Symptoms of vaginal bulge much improved    [facility] Urogyn [facility]     Question 8/21/2019  9:25 PM CDT - Filed by Patient on 8/21/2019   General Urogynecology: Are you experiencing the following?    Dysuria (painful urination) No   Nocturia:  waking up at night to empty your bladder  Yes   If you answered yes to the previous question, how many times does this happen per night? 3-4   Enuresis (urine loss during sleep) No   Dribbling urine after you urinate No   Hematuria (urine appears red) No   Type of stream Weak   Urinary frequency: How often a day are you going to the bathroom per day?  10-15   Urinary Tract Infections: How many Urinary Tract Infections have you had in the past year? I have not had a UTI in the past year   If you have had a UTI in the past year, what treatments have you had so far?  I have not had a UTI in the past year   Urinary Incontinence (General): Are you experiencing the following?    Past consultation for incontinence: Have you ever seen someone for the evaluation of incontinence? No   If you answered yes to the previous question, please select all the therapies you have tried.  N/a- I answered no to the previous question   Please note the effectiveness of the therapies.    Need to wear protection to keep clothes dry  Yes   If you answered yes to the previous question, please basliia the protection you use.  Panty liner   If you wear protection, how much wetness is typically on each pad? Slight   If you wear protection, how often do you have to change per day, if applicable?  2   Stress Symptoms: Are you experiencing the following?    Leakage of urine with cough, laugh and/or sneeze No   If you answered yes to the previous question, what is the frequency in days, weeks and/or months? Never   Leakage of urine  "with sex No   Leakage of urine with bending/ lifting No   Leakage of urine with briskly walking or jogging No   If you lose urine for any other reason not previously mentioned, please note it below, if applicable.  n/a   Urge Symptoms: Are you experiencing the following?    Urgency ("got to go" feeling) Yes   Urge: How frequently do you feel an urge to urinate (feeling like you "gotta go" to the bathroom and can't wait) Several times a day   Do you experience a leakage of urine when you have a feeling of urgency?  Yes   Leakage of urine when unaware No   Past use of anticholinergics (medications used to treat overactive bladder) No   If you answered yes to the previous question, please basilia the anticholinergics you have used:     Have you ever used Mirbetriq (aka Mirabegron)?  No   Prolapse Symptoms: Are you experiencing any of the following?     Falling out/ Bulging/ Heaviness in the vagina No   Vaginal/ Abdominal Pain/ Pressure No   Need to strain/ Push to void No   Need to wait on the toilet before you void No   Unusual position to urinate (using your hands to push back the vaginal bulge) No   Sensation of incomplete emptying Yes   Past use of pessary device No   If you answered yes to the previous question, please list the devices you have used below.  n/a   Bowel Symptoms: Are you experiencing any of the following?    Constipation No   Diarrhea  No   Hematochezia (bloody stool) No   Incomplete evacuation of stool No   Involuntary loss of formed stool No   Fecal smearing/urgency Yes   Involuntary loss of gas Yes   Vaginal Symptoms: Are you experiencing any of the following?     Abnormal vaginal bleeding  No   Vaginal dryness No   Sexually active  No   Dyspareunia (painful intercourse) No   Estrogen use  No     GYN & OB History  Patient's last menstrual period was 2022 (exact date).   Date of Last Pap: 2020    OB History    Para Term  AB Living   1 1 1     1   SAB IAB Ectopic Multiple " Live Births         0 1      # Outcome Date GA Lbr Anurag/2nd Weight Sex Delivery Anes PTL Lv   1 Term 07/18/19 37w3d  2.8 kg (6 lb 2.8 oz) M Vag-Spont EPI N GILLIAN      Complications: Chorioamnionitis     OB  Largest: 6#  Forceps:  Episiotomy: 2nd degree    GYN  Menarche: 15  Menstrual cycle: nomral  Menopause: amenorrhea prior to pregnancy   Hysterectomy: no  Ovaries: present       Review of Systems  Review of Systems   Constitutional: Negative for activity change, appetite change, chills, diaphoresis, fatigue, fever and unexpected weight change.   Respiratory: Negative for cough.    Gastrointestinal: Negative for abdominal distention, abdominal pain, anal bleeding, blood in stool, constipation, diarrhea, nausea, rectal pain and vomiting.   Genitourinary: Negative for decreased urine volume, difficulty urinating, dyspareunia, dysuria, enuresis, flank pain, frequency, genital sores, hematuria, menstrual problem, pelvic pain, urgency, vaginal bleeding, vaginal discharge and vaginal pain.   Musculoskeletal: Negative for back pain.   Skin: Negative for color change, pallor, rash and wound.   Allergic/Immunologic: Negative for environmental allergies, food allergies and immunocompromised state.   Hematological: Negative for adenopathy. Does not bruise/bleed easily.   Psychiatric/Behavioral: Negative for agitation, behavioral problems, confusion and sleep disturbance.           Objective:     Physical Exam   Constitutional: She is oriented to person, place, and time. She appears well-developed and well-nourished.   HENT:   Head: Normocephalic and atraumatic.   Pulmonary/Chest: No respiratory distress. She has no wheezes.   Musculoskeletal:         General: Normal range of motion.      Cervical back: Normal range of motion and neck supple.   Neurological: She is alert and oriented to person, place, and time.   Skin: No cyanosis. Nails show no clubbing.   Psychiatric: She has a normal mood and affect. Her behavior is normal.  Judgment and thought content normal.          POPQ  -1 -1 -4  3 3  9  0 0 -5     HCM  Pap's: normal   Mammo: normal 2018  Colonoscopy more than 10 years, normal   Dexa: n/a    Assessment:        1. Posterior vaginal wall prolapse    2. Prolapse of anterior vaginal wall             Plan:     1.  Mixed urinary incontinence, urge > stress: now urge incontinence more bothersome    --Empty bladder every 3 hours.  Empty well: wait a minute, lean forward on toilet.    --Avoid dietary irritants (see sheet).  Keep diary x 3-5 days to determine your irritants.  --KEGELS: do 10 in AM and 10 in PM, holding each x 10 seconds.  When you feel urge to go, STOP, KEGEL, and when urge has passed, then go to bathroom.    --URGE: increase vesicare 10 mg tablet daily   --STRESS:  Pessary vs. Sling vs impressa (stress incontinence made worse with the pessary in place)    2  Fecal incontinence/ incomplete bowel emptying likely secondary to outlet obstruction given her rectocel: over all resolved   --Fiber may better control cholesterol and blood glucose.  daily fiber.  Take 1 tsp of fiber powder (psyllium or other sugar-free powder).  Mix in 8 oz of water.  Take x 3-5 days.  Then, increase fiber by 1 tsp every 3-5 days until stool is easy to pass.  ( Metamucil, benefiber )   --Keep a bowel diary   -- consider Lomotil in the future  -- S/P Pelvic floor PT working with Leoncio Sethi   --Endoanal sonogram to evaluate the sphincter   --Anal manometry   --We discussed treatment option including Pelvic floor PT, SNS, and Sphincteroplasty. Risks, benefits reviewed in detail. She has worked with [employer], with mild improvement, she has recently started working with Leoncio at [employer]   -- s/p Colorectal evaluation     3. Prolapse: Stage 2 anterior and posterior vaginal wall prolapse much improved working with leoncio Sethi  --,using the pessary as needed   --Daily pelvic floor exercises as assigned  --Non surgical options discussed with  patient    --Surgical options also discussed, she's more interested in the non-surgical treatment options for now.     Approximately 30 min were spent in consult, 90 % in discussion.     Laury Casillas DO  Female Pelvic Medicine and Reconstructive Surgery  [facility] [place], [place]

## 2022-03-29 ENCOUNTER — PATIENT MESSAGE (OUTPATIENT)
Dept: ENDOCRINOLOGY | Facility: CLINIC | Age: 52
End: 2022-03-29

## 2022-03-29 ENCOUNTER — OFFICE VISIT (OUTPATIENT)
Dept: ENDOCRINOLOGY | Facility: CLINIC | Age: 52
End: 2022-03-29
Payer: COMMERCIAL

## 2022-03-29 VITALS
OXYGEN SATURATION: 99 % | SYSTOLIC BLOOD PRESSURE: 120 MMHG | WEIGHT: 146.81 LBS | HEART RATE: 57 BPM | BODY MASS INDEX: 22.25 KG/M2 | DIASTOLIC BLOOD PRESSURE: 80 MMHG | HEIGHT: 68 IN

## 2022-03-29 DIAGNOSIS — M85.80 LOW BONE MASS: ICD-10-CM

## 2022-03-29 PROCEDURE — 3008F BODY MASS INDEX DOCD: CPT | Mod: CPTII,S$GLB,, | Performed by: INTERNAL MEDICINE

## 2022-03-29 PROCEDURE — 1159F PR MEDICATION LIST DOCUMENTED IN MEDICAL RECORD: ICD-10-PCS | Mod: CPTII,S$GLB,, | Performed by: INTERNAL MEDICINE

## 2022-03-29 PROCEDURE — 3079F DIAST BP 80-89 MM HG: CPT | Mod: CPTII,S$GLB,, | Performed by: INTERNAL MEDICINE

## 2022-03-29 PROCEDURE — 3074F PR MOST RECENT SYSTOLIC BLOOD PRESSURE < 130 MM HG: ICD-10-PCS | Mod: CPTII,S$GLB,, | Performed by: INTERNAL MEDICINE

## 2022-03-29 PROCEDURE — 3079F PR MOST RECENT DIASTOLIC BLOOD PRESSURE 80-89 MM HG: ICD-10-PCS | Mod: CPTII,S$GLB,, | Performed by: INTERNAL MEDICINE

## 2022-03-29 PROCEDURE — 99203 PR OFFICE/OUTPT VISIT, NEW, LEVL III, 30-44 MIN: ICD-10-PCS | Mod: S$GLB,,, | Performed by: INTERNAL MEDICINE

## 2022-03-29 PROCEDURE — 3008F PR BODY MASS INDEX (BMI) DOCUMENTED: ICD-10-PCS | Mod: CPTII,S$GLB,, | Performed by: INTERNAL MEDICINE

## 2022-03-29 PROCEDURE — 99999 PR PBB SHADOW E&M-EST. PATIENT-LVL III: CPT | Mod: PBBFAC,,, | Performed by: INTERNAL MEDICINE

## 2022-03-29 PROCEDURE — 3074F SYST BP LT 130 MM HG: CPT | Mod: CPTII,S$GLB,, | Performed by: INTERNAL MEDICINE

## 2022-03-29 PROCEDURE — 99203 OFFICE O/P NEW LOW 30 MIN: CPT | Mod: S$GLB,,, | Performed by: INTERNAL MEDICINE

## 2022-03-29 PROCEDURE — 99999 PR PBB SHADOW E&M-EST. PATIENT-LVL III: ICD-10-PCS | Mod: PBBFAC,,, | Performed by: INTERNAL MEDICINE

## 2022-03-29 PROCEDURE — 1159F MED LIST DOCD IN RCRD: CPT | Mod: CPTII,S$GLB,, | Performed by: INTERNAL MEDICINE

## 2022-03-29 NOTE — PATIENT INSTRUCTIONS
Take one citra erwin daily   Take MVI daily   Make sure you are taking at least 1000 IUs daily of vitamin D daily.    Estimated Calcium Content of Foods:  Produce  Serving Size Estimated Calcium*    [place] greens, frozen 8 oz 360 mg   Broccoli liam 8 oz 200 mg   Kale, frozen 8 oz 180 mg   Soy Beans, green, boiled 8 oz 175 mg   Bok [place], cooked, boiled 8 oz 160 mg   Figs, dried 2 figs 65 mg   Broccoli, fresh, cooked 8 oz 60 mg   Oranges 1 whole 55 mg   Seafood Serving Size Estimated Calcium*    Sardines, canned with bones 3 oz 325 mg   [place], canned with bones 3 oz 180 mg   Shrimp, canned 3 oz 125 mg   Dairy Serving Size Estimated Calcium*    Ricotta, part-skim 4 oz 335 mg   Yogurt, plain, low-fat 6 oz 310 mg   Milk, skim, low-fat, whole 8 oz 300 mg   Yogurt with fruit, low-fat 6 oz 260 mg   Mozzarella, part-skim 1 oz 210 mg   Cheddar 1 oz 205 mg   Yogurt, Greek 6 oz 200 mg   American Cheese 1 oz 195 mg   Feta Cheese 4 oz 140 mg   Cottage Cheese, 2% 4 oz 105 mg   Frozen yogurt, vanilla 8 oz 105 mg   Ice Cream, vanilla 8 oz 85 mg   Parmesan 1 tbsp 55 mg   Fortified Food Serving Size Estimated Calcium*   [place] milk, rice milk or soy milk, fortified 8 oz 300 mg   Orange juice and other fruit juices, fortified 8 oz 300 mg   Tofu, prepared with calcium 4 oz 205 mg   Waffle, frozen, fortified 2 pieces 200 mg   Oatmeal, fortified 1 packet 140 mg   English muffin, fortified 1 muffin 100 mg   Cereal, fortified 8 oz 100-1,000 mg   Other Serving Size Estimated Calcium*   Mac & cheese, frozen 1 package 325 mg   Pizza, cheese, frozen 1 serving 115 mg   Pudding, chocolate, prepared with 2% milk 4 oz 160 mg   Beans, baked, canned 4 oz 160 mg   *The calcium content listed for most foods is estimated and can vary due to multiple factors. Check the food label to determine how much calcium is in a particular product.  If you read the nutrition label for a food source, it lists the % calcium in that food.  For an 8 oz glass of milk,  for example, the label states calcium 30%.  This is equivalent to 300 mg of calcium (multiply the listed number by 10).   **Table from the National [employer]opor[employer] Foundation

## 2022-03-29 NOTE — PROGRESS NOTES
Subjective:      Patient ID: Dipti Pacheco is a 51 y.o. female.    Chief Complaint:  No chief complaint on file.      History of Present Illness    Ms. Pacheco is a 51 y.o. female who is here for a follow-up visit for evaluation secondary amenorrhea in the context of low body weight, low bone mass and hypercalciuria.   Previously seen by me, last visit in 2017    Underwent IUI with successful pregnancy. She now has a 2 1/2 year old son who is healthy. Two months after stopping lactation/pumping started having regular menstrual cycles, (around 9/2020.) cycles have been fairly regular except for two months in 12/2021 and 1/2022.    Using premarin cream.   Used low dose thyroid hormone during pregnancy for TSH in the 3s. Denies current use of thyroid hormone.    Her last DXA scan two years ago demonstrates low bone mass, T score -1.7 at the [facility] with normal Z scores.   Denies falls or fractures.   Exercising daily.    Balance is good. yoga     Supplements:  MVI daily   citra erwin + D two a day     Dietary:   Cheese yogurts  Dark leafy greens     Review of Systems   Constitutional: Negative for fever.   HENT: Negative for congestion.    Eyes: Negative for visual disturbance.   Respiratory: Negative for shortness of breath.    Cardiovascular: Negative for chest pain.   Gastrointestinal: Negative for abdominal pain.   Genitourinary: Negative for dysuria.   Musculoskeletal: Negative for arthralgias.   Skin: Negative for rash.   Neurological: Negative for weakness.   Hematological: Does not bruise/bleed easily.   Psychiatric/Behavioral: Negative for sleep disturbance.       Objective:   Physical Exam  Constitutional:       General: She is not in acute distress.     Appearance: She is well-developed.   Eyes:      General: No scleral icterus.     Conjunctiva/sclera: Conjunctivae normal.      Pupils: Pupils are equal, round, and reactive to light.      Comments: No proptosis.    Neck:      Thyroid: No thyromegaly.       "Trachea: No tracheal deviation.   Cardiovascular:      Rate and Rhythm: Normal rate.   Pulmonary:      Effort: Pulmonary effort is normal.      Breath sounds: Normal breath sounds.   Musculoskeletal:      Cervical back: Normal range of motion and neck supple.   Lymphadenopathy:      Cervical: No cervical adenopathy.   Skin:     General: Skin is warm and dry.      Findings: No rash.   Neurological:      Mental Status: She is alert and oriented to person, place, and time.      Deep Tendon Reflexes: Reflexes are normal and symmetric.      Comments: No tremor.       Vitals:    03/29/22 0932   BP: 120/80   BP Location: Left arm   Patient Position: Sitting   BP Method: Medium (Manual)   Pulse: (!) 57   SpO2: 99%   Weight: 66.6 kg (146 lb 13.2 oz)   Height: 5' 8" (1.727 m)       BP Readings from Last 3 Encounters:   03/29/22 120/80   03/24/22 117/70   10/29/21 123/74     Wt Readings from Last 1 Encounters:   03/29/22 0932 66.6 kg (146 lb 13.2 oz)         Body mass index is 22.32 kg/m².    Lab Review:   No results found for: HGBA1C  No results found for: CHOL, HDL, LDLCALC, TRIG, CHOLHDL  Lab Results   Component Value Date     (L) 04/27/2018    K 5.1 04/27/2018     04/27/2018    CO2 27 04/27/2018    GLU 92 04/27/2018    BUN 20 04/27/2018    CREATININE 0.8 04/27/2018    CALCIUM 9.7 04/27/2018    PROT 7.3 04/27/2018    ALBUMIN 4.0 04/27/2018    BILITOT 0.3 04/27/2018    ALKPHOS 63 04/27/2018    AST 25 04/27/2018    ALT 29 04/27/2018    ANIONGAP 7 (L) 04/27/2018    ESTGFRAFRICA >60 04/27/2018    EGFRNONAA >60 04/27/2018    TSH 3.279 05/20/2021      Latest Reference Range & Units 06/08/15 09:26   Calcium, Urine (mg/spec) mg/Spec 171      Latest Reference Range & Units 01/18/16 10:08   Calcium, Urine (mg/spec) mg/Spec 240      Latest Reference Range & Units 08/22/16 08:42   Calcium, Urine (mg/spec) mg/Spec 150  150     Assessment and Plan     Low bone mass  Risk factors: , long history of secondary " amenorrhea, low body weight, family history   Continue ca/vitamin D (lower supplemental calcium)  Continue dietary calcium     Balance and exercise training   Repeat DXA scan 12/2022

## 2022-05-17 ENCOUNTER — PATIENT MESSAGE (OUTPATIENT)
Dept: UROGYNECOLOGY | Facility: CLINIC | Age: 52
End: 2022-05-17
Payer: COMMERCIAL

## 2022-05-17 DIAGNOSIS — N39.41 URGE INCONTINENCE: Primary | ICD-10-CM

## 2022-05-17 RX ORDER — SOLIFENACIN SUCCINATE 10 MG/1
10 TABLET, FILM COATED ORAL DAILY
Qty: 30 TABLET | Refills: 11 | Status: SHIPPED | OUTPATIENT
Start: 2022-05-17 | End: 2023-04-20 | Stop reason: SDUPTHER

## 2022-05-20 ENCOUNTER — OFFICE VISIT (OUTPATIENT)
Dept: HEMATOLOGY/ONCOLOGY | Facility: CLINIC | Age: 52
End: 2022-05-20
Payer: COMMERCIAL

## 2022-05-20 VITALS
HEIGHT: 68 IN | BODY MASS INDEX: 22.05 KG/M2 | DIASTOLIC BLOOD PRESSURE: 73 MMHG | RESPIRATION RATE: 16 BRPM | SYSTOLIC BLOOD PRESSURE: 113 MMHG | WEIGHT: 145.5 LBS | HEART RATE: 68 BPM | OXYGEN SATURATION: 99 % | TEMPERATURE: 99 F

## 2022-05-20 DIAGNOSIS — Z91.89 AT HIGH RISK FOR BREAST CANCER: ICD-10-CM

## 2022-05-20 DIAGNOSIS — Z80.3 FAMILY HISTORY OF BREAST CANCER: ICD-10-CM

## 2022-05-20 DIAGNOSIS — R92.30 DENSE BREAST TISSUE ON MAMMOGRAM: Primary | ICD-10-CM

## 2022-05-20 PROCEDURE — 3008F BODY MASS INDEX DOCD: CPT | Mod: CPTII,S$GLB,, | Performed by: NURSE PRACTITIONER

## 2022-05-20 PROCEDURE — 99999 PR PBB SHADOW E&M-EST. PATIENT-LVL V: ICD-10-PCS | Mod: PBBFAC,,, | Performed by: NURSE PRACTITIONER

## 2022-05-20 PROCEDURE — 1159F MED LIST DOCD IN RCRD: CPT | Mod: CPTII,S$GLB,, | Performed by: NURSE PRACTITIONER

## 2022-05-20 PROCEDURE — 3074F SYST BP LT 130 MM HG: CPT | Mod: CPTII,S$GLB,, | Performed by: NURSE PRACTITIONER

## 2022-05-20 PROCEDURE — 3074F PR MOST RECENT SYSTOLIC BLOOD PRESSURE < 130 MM HG: ICD-10-PCS | Mod: CPTII,S$GLB,, | Performed by: NURSE PRACTITIONER

## 2022-05-20 PROCEDURE — 3008F PR BODY MASS INDEX (BMI) DOCUMENTED: ICD-10-PCS | Mod: CPTII,S$GLB,, | Performed by: NURSE PRACTITIONER

## 2022-05-20 PROCEDURE — 99205 PR OFFICE/OUTPT VISIT, NEW, LEVL V, 60-74 MIN: ICD-10-PCS | Mod: S$GLB,,, | Performed by: NURSE PRACTITIONER

## 2022-05-20 PROCEDURE — 99205 OFFICE O/P NEW HI 60 MIN: CPT | Mod: S$GLB,,, | Performed by: NURSE PRACTITIONER

## 2022-05-20 PROCEDURE — 1159F PR MEDICATION LIST DOCUMENTED IN MEDICAL RECORD: ICD-10-PCS | Mod: CPTII,S$GLB,, | Performed by: NURSE PRACTITIONER

## 2022-05-20 PROCEDURE — 99999 PR PBB SHADOW E&M-EST. PATIENT-LVL V: CPT | Mod: PBBFAC,,, | Performed by: NURSE PRACTITIONER

## 2022-05-20 PROCEDURE — 3078F PR MOST RECENT DIASTOLIC BLOOD PRESSURE < 80 MM HG: ICD-10-PCS | Mod: CPTII,S$GLB,, | Performed by: NURSE PRACTITIONER

## 2022-05-20 PROCEDURE — 3078F DIAST BP <80 MM HG: CPT | Mod: CPTII,S$GLB,, | Performed by: NURSE PRACTITIONER

## 2022-05-20 NOTE — PATIENT INSTRUCTIONS
1. Increased risk of breast cancer   * Celestino-Kylieck (TC) lifetime risk of 28.1%. We discussed that TC score will categorize your lifetime risk of being diagnosed with breast cancer. Categories are as such: Average risk <15%, Intermediate risk 15-19%, and High risk > or = to 20%.    *The Angela Model for Breast Cancer risk: She has a 1.8% 5-year breast cancer risk (Compared with 1.3% for the average 50 y.o. woman) and 15.9% Lifetime breast cancer risk (Compared with 11.2% for the average 50 y.o. woman). A woman's risk is considered low if her five-year risk of developing breast cancer is less than 1.6%; it is considered high if she scores above 1.66%. (All women who are over 60 have a score of at least 1.66 and are considered high risk, based on the Angela Model.)    Educational videos:   What Is a TC Score?  https://youModern Boutiqueu.be/Uvkx7OUOicB     What If I Have a High-Risk TC Score?   https://[employer].be/zAs7uOx6f6D      Discussed with patient that there are several models available for stratifying breast cancer risk, and Tyrer-Cuzick is presently the model utilized by [facility] and is a model recommended per current NCCN guidelines.    The Angela Model for Breast Cancer risk estimates the absolute 5 year risk and lifetime risk of developing breast cancer. Family history includes only first degree relatives with breast cancer, which is not enough information to estimate the risk of a patient having BRCA mutation. It also underestimates the cancer risk for patients with extensive family history. The Angela Model is a good predictor of risk for populations but not for individuals. It adjusts risk for race/ethnicity. It may underestimate breast cancer risk in patients with atypical hyperplasia and strong family history. The Angela Model was NOT designed to estimate risk for: Women with a prior diagnosis of breast cancer, lobular carcinoma in situ (LCIS), or ductal carcinoma in situ (DCIS);  Women who have received previous  radiation therapy to the chest for treatment of Hodgkin lymphoma;  Women with gene mutations in BRCA1 or BRCA2, or those who are known to have certain genetic syndromes that increase risk for breast cancer; Women of age <35 or >85.    We discussed that there are limitations to every model for risk assessment, particularly that TC can overestimate risk in women with atypical hyperplasia and dense breasts and that Angela underestimates risk for those with a strong family history of breast or ovarian cancers as well as non-white women with atypical hyperplasia which can make them appear to not be candidates for risk reducing therapies.             Recommendation for women with elevated TC score:     Recommendation for women with elevated Angela Model.             Risk factors are categorized into 2 groups: Modifiable and Non-modifiable. Modifiable risk factors include use of hormones, alcohol, smoking, diet and exercise. Non-modifiable risk factors include breast density, genetics, chest radiation, previous pregnancies, age of first period, and age of menopause.     Factors associated with greater breast cancer risk:  -Increasing age The risk of breast cancer increases with older age.  -Female sex  -White race (In the [place], the highest breast cancer risk occurs among White women, although breast cancer remains the most common cancer among women of every major ethnic/racial group )  -Weight and body fat in postmenopausal women -Obesity (defined as body mass index [BMI] ?30 kg/m2) is associated with an overall increase  in morbidity and mortality. However, the risk of breast cancer associated with BMI differs by menopausal status.   ?Postmenopausal women - A higher BMI and/or perimenopausal weight gain have been consistently associated with a higher risk of breast cancer among postmenopausal women. The association between a higher BMI and postmenopausal breast cancer risk may be mediated by higher estrogen levels  resulting from the peripheral conversion of estrogen precursors (from adipose tissue) to estrogen    ?Inverse relationship in premenopausal women - Unlike postmenopausal women, an increased BMI is associated with a lower risk of breast cancer in premenopausal women, particularly in early adulthood.  The explanation of this finding remains unclear.  -Tall stature -women who were >175 cm (69 inches) tall were 20 percent more likely to develop breast cancer than those <160 cm (63 inches) tall.  -Benign breast disease  -Dense breast tissue -- The density of breast tissue reflects the relative amount of glandular and connective tissue (parenchyma) to adipose tissue. Women with mammographically dense breast tissue, generally defined as dense tissue comprising ?75 percent of the breast, have a four to five times higher breast cancer risk compared with women of similar age with less or no dense tissue. Although breast density is a largely inherited trait, other factors can influence density. For example, lower density has been associated with higher levels of physical activity  and with a low-fat, high-carbohydrate diet. In postmenopausal women, estrogen and progesterone increase breast density  while the ER antagonist tamoxifen decreases breast density.  Despite the association of exogenous hormones with breast density, breast density is not strongly correlated with endogenous hormone levels. Breast tend to become more fatty with age.   -Bone mineral density -In multiple studies, women with higher bone density have a higher breast cancer risk  -Hormonal factors: HRT. Of note, IVF does not appear to increase the long-term risk of breast cancer, even in women with BRCA 1 and 2 mutations.     In the Women's Health Initiative (WHI), the risk of invasive breast cancer was significantly increased with combined hormone therapy (HT) at an average follow-up of 5.6 years.     A 2019 meta-analysis of all available epidemiologic  evidence on the association between menopausal hormone therapy (MHT) use and breast cancer risk has been published. The analysis included nearly 145,000 women with breast cancer (51 percent of whom had used MHT) and nearly 425,000 without breast cancer. Their findings included:  ?Similar to the WHI, estrogen-progestin regimens were associated with excess breast cancer risk. An excess risk was also seen with estrogen-only regimens (a reduction in risk was seen in the WHI). There was no excess risk with vaginal estrogens.   ?Breast cancer risk increased with the duration of systemic MHT use. Unlike previous studies, obesity was not associated with excess risk; instead, it attenuated risk.  ?The authors of the study calculated that for women of average weight, five years of MHT use starting at age 50 years would increase their 20-year risk of breast cancer (between the ages of 50 and 69 years) by approximately:  One in every 50 users of estrogen plus daily progestin  One in every 70 users of estrogen plus intermittent progestin   One in every 200 users of estrogen-only regimens   Of note: There were important limitations in this study.   While this meta-analysis has renewed concerns for some about the association between MHT and breast cancer, we continue to suggest an individualized approach when counseling symptomatic postmenopausal women about treatment. This includes putting the potential risk of breast cancer (and cardiovascular disease) in the context of the benefits of MHT (eg, relief of vasomotor symptoms, improved sleep and quality of life, and prevention of bone loss)  -Reproductive factors -Earlier menarche or later menopause, Nulliparity, Increasing age at first full-term pregnancy.   (Of note, It is estimated that for every 12 months of breastfeeding, there was a 4.3 percent reduction in the relative risk (RR) of breast cancer)  -Personal and family history of breast cancer  -Alcohol use and  smoking  -Exposure to therapeutic ionizing radiation          LIFESTYLE MODIFICATIONS:    * Reviewed Lifestyle modifications which have shown benefit:  Limit alcohol consumption to less than 1 drink per day (1 ounce liquor, 6 oz wine, 8 oz beer)  Avoid smoking.  Exercise at least 150 minutes per week of moderate intensity aerobic activity or at least 75 minutes of vigorous activity. Exercise can lower the relative risk of breast cancer by ~18-20%.  Maintain healthy weight and avoid post-menopausal weight gain. Avoid processed foods and eat more lean proteins, fruits and vegetables.                               * Discussed available resources including genetic counseling, nutrition, weight management.       CHEMOPREVENTION:    * For women at high risk for breast cancer, endocrine therapy can reduce the risk of invasive and/or in situ breast cancers. (tamoxifen for premenopausal or postmenopausal women and raloxifene or exemestane for postmenopausal women).   -Discussed that Tamoxifen 20 mg daily for 5 years has shown to reduce risk of breast cancer by 49% and women with ADH/ALH or LCIS have an even more significant reduction of risk of 86%. Aromatase inhibitors for 5 years have also shown risk reduction in terms of 50-60%. At current, there is not adequate data to recommend longer courses of therapy more than 5 years for risk reduction.    -Above have been shown to lower the risk of breast cancer incidence, however there is no survival benefit in patients who don't have breast cancer.   -Tamoxifen has limited data in BRCA 1/2 mutation carriers but limited retrospective data is suggestive of benefit. There is retrospective data that aromatase inhibitors can reduce the risk of contralateral ER positive breast cancers in BRCA 1/2 patients who were taking AIs as adjuvant therapy. There is no data for raloxifen in the population.    -Reviewed risks of Tamoxifen side effects include hot flashes, invasive endometrial  cancer in women > 49 years of age (2.3/1000 compared to 0.9/1000), cataracts, increased risk of pulmonary embolism among others. A handout was given to her.      SCREENING:                * Reviewed future screening:   Semiannual (every 6 months) CBE (clinical breast exam).   Annual Breast MRI alternating with an annual MMG.     The use of MRI for breast cancer detection is based on the concept of  tumor angiogenesis or neovascularity. Tumor-associated blood vessels have increased permeability, which leads to prompt uptake and release of gadolinium within the first one to two minutes after administration, leading to a pattern of rapid enhancement and washout on MRI.   Bilateral breast examination - Both breasts should be evaluated in an MRI study, for comparison purposes, even when concern about possible pathology involves only one breast.  Contrast - Intravenous gadolinium contrast must be used to maximize cancer detection and is administered before breast MRI to highlight the neovascularity associated with cancers. Contrast is not necessary when the study is performed to evaluate silicone implant integrity.  Allergic and anaphylactoid reactions to gadolinium are rare, but can occur. In addition, in patients with renal failure, gadolinium can cause contrast nephropathy and/or nephrogenic systemic fibrosis.   A few studies have also reported gadolinium deposition in the brain from repeated intravenous administration, with the degree of deposition varying based on the specific contrast agent. The clinical significance of this deposition remains unknown, and no data for humans exist to show any adverse effects or harm at this time.   She understands that she may contact her insurance company with regards to coverage of MRI breasts.   She can not undergo an MRI if pregnant.   We discussed that MRI's may have false positives                    Plan:     Patient has opted out of chemoprevention but will call in the  future if she wishes to pursue.   Patient elects to proceed with alternating annual mammogram and annual breast MRI along with semiannual CBEs.  Patient will follow up with PCP or GYN for one semiannual CBE and will RTC here for one semiannual CBE.  Lifestyle modifications as detailed above.   5.   Encouraged breast awareness, including monthly breast self-exams.   6.   Referral to cancer genetics.    MRI breast 9/2022   RTC in 3/2023 to see me either at [facility] or on 3rd floor with a MMG

## 2022-05-20 NOTE — PROGRESS NOTES
Reason For Consultation:   Increased lifetime risk of breast cancer    Referring Provider:   Edith Machado MD  [address] [place] [address]    Records Obtained: Records of the patients history including those obtained from the referring provider were reviewed and summarized in detail.    HPI:   Dipti Pacheco is a 51 y.o. who presents for consultation of increased risk of breast cancer.      Today, Feels good and no complaints.   No breast concerns.    3/21/2022 MMG:   Impression:   No mammographic evidence of malignancy.     BI-RADS Category 1: Negative     Recommendation:  Routine screening mammogram in 1 year is recommended.     Your estimated lifetime risk of breast cancer (to age 85) based on Tyrer-Cuzick risk assessment model is 26.64 %.  According to the American Cancer Society, patients with a lifetime breast cancer risk of 20% or higher might benefit from supplemental screening tests. ??     High Risk Breast cancer specific history:  - Age: 51 y.o.   - Height:  5'8  - Weight: 145 lbs  - Breast density per BI-RADS:  d - Extremely dense  - Age at menarche:  14 yo  - Number of pregnancies: ; age of first live birth: 50 yo  - History of breast feeding: Yes - 12 months.   - She is premenopausal or perimenopausal.   -Uterus and ovaries intact: Yes  - HRT: No  - Genetic testing: Yes - ENT ordered due to hearing loss.   - Personal history of cancer: No  - Previous chest radiation exposure between ages 10-30 years old: No  - Personal history of breast biopsy: No  - Ashkenazi [employer] Inheritance: No  - Family history of cancer:    Mom- breast cancer diagnosed 75 currently 77 - no genetic test.   No other cancers     Social History:  Tobacco use:  no  Alcohol use:  social  Employment: yes- [employer]     SEE CALCULATED RISK BELOW.     Past Medical   Past Medical History:   Diagnosis Date    Allergy     Anxiety     Disorder of muscle 2019    Iron deficiency anemia secondary to  inadequate dietary iron intake 7/19/2019    Subclinical hypothyroidism      Patient Active Problem List   Diagnosis    Mouth ulcers    Leukoplakia    Low bone mass    Hearing loss sensory, bilateral    Breast feeding status of mother    Iron deficiency anemia secondary to inadequate dietary iron intake    Disorder of muscle    Fecal incontinence    Urge incontinence    Prolapse of anterior vaginal wall     Social History   Social History     Tobacco Use    Smoking status: Never Smoker    Smokeless tobacco: Never Used   Substance Use Topics    Alcohol use: Yes     Comment: socially    Drug use: No     Family History  Family History   Problem Relation Age of Onset    Alcohol abuse Brother     Depression Brother     Diabetes Mellitus Paternal Grandmother     Alcohol abuse Paternal Grandfather     Breast cancer Mother 71    Melanoma Neg Hx      Medications    Current Outpatient Medications:     B INFANTIS/B ANI/B JET/B BIFID (PROBIOTIC DIGESTIVE CARE ORAL), Take 1 capsule by mouth as needed., Disp: , Rfl:     betahistine HCl (BETAHISTINE, BULK, MISC), by Misc.(Non-Drug; Combo Route) route. INJECTION, Disp: , Rfl:     calcium-vitamin D3 (OS-ASIM 500 + D3) 500 mg-5 mcg (200 unit) per tablet, Take 1 tablet by mouth 2 (two) times daily with meals., Disp: , Rfl:     conjugated estrogens (PREMARIN) vaginal cream, 1 g with applicator or dime-sized amt with finger in vagina nightly x 2 weeks, then twice a week thereafter, Disp: 45 g, Rfl: 12    conjugated estrogens (PREMARIN) vaginal cream, 1 g with applicator or dime-sized amt with finger in vagina nightly x 2 weeks, then twice a week thereafter, Disp: 45 g, Rfl: 4    cyanocobalamin, vitamin B-12, 50 mcg tablet, Take 50 mcg by mouth once daily., Disp: , Rfl:     histamine phosphate 1 (2.75) mg/mL Soln, Inject 1 mL into the skin., Disp: , Rfl:     hydroCHLOROthiazide (MICROZIDE) 12.5 mg capsule, , Disp: , Rfl:     hydrocortisone 2.5 % cream, Apply  "topically 2 (two) times daily., Disp: 20 g, Rfl: 1    multivitamin (THERAGRAN) per tablet, Take 1 tablet by mouth once daily., Disp: , Rfl:     solifenacin (VESICARE) 10 MG tablet, Take 1 tablet (10 mg total) by mouth once daily., Disp: 30 tablet, Rfl: 11  Allergies  Review of patient's allergies indicates:   Allergen Reactions    Aspirin Nausea Only and Nausea And Vomiting     Powdered form only   *powder form* when little       Review of Systems       See above   All other systems reviewed and are negative.    Objective:      Vitals:   Vitals:    05/20/22 1458   BP: 113/73   BP Location: Left arm   Patient Position: Sitting   BP Method: Large (Automatic)   Pulse: 68   Resp: 16   Temp: 98.5 °F (36.9 °C)   TempSrc: Oral   SpO2: 99%   Weight: 66 kg (145 lb 8.1 oz)   Height: 5' 8" (1.727 m)     BMI: Body mass index is 22.12 kg/m².   Body surface area is 1.78 meters squared.    Physical Exam  Vitals signs reviewed.   Constitutional:  Normal appearance. NAD.   HENT: Normocephalic.   Eyes: Pupils are equal, round, and reactive to light.   Cardiovascular: Normal rate.   Pulmonary: Pulmonary effort is normal.   Musculoskeletal: Normal range of motion.   Lymphadenopathy: No cervical adenopathy. No axillary adenopathy.   Skin: Skin is warm and dry.   Neurological:  She is alert and oriented to person, place, and time.   Psychiatric: Mood normal.     Breast Exam: Bilateral breast normal. No masses, no tenderness, no skin or nipple abnormalities.  No lymphadenopathy.  +dense      Laboratory Data: reviewed most recent   Imaging: reviewed most recent        Assessment:     1. Dense breast tissue on mammogram    2. At high risk for breast cancer    3. Family history of breast cancer        1. Increased risk of breast cancer   * Tyrer-Cuzick (TC) lifetime risk of 28.1%. We discussed that TC score will categorize your lifetime risk of being diagnosed with breast cancer. Categories are as such: Average risk <15%, Intermediate " risk 15-19%, and High risk > or = to 20%.    *The Angela Model for Breast Cancer risk: She has a 1.8% 5-year breast cancer risk (Compared with 1.3% for the average 50 y.o. woman) and 15.9% Lifetime breast cancer risk (Compared with 11.2% for the average 50 y.o. woman). A woman's risk is considered low if her five-year risk of developing breast cancer is less than 1.6%; it is considered high if she scores above 1.66%. (All women who are over 60 have a score of at least 1.66 and are considered high risk, based on the Angela Model.)    Educational videos:   What Is a TC Score?  https://youtu.be/Aqnz8OFVpyQ     What If I Have a High-Risk TC Score?   https://youtu.be/bXw1wXw2o9S      Discussed with patient that there are several models available for stratifying breast cancer risk, and Celestino-Nohemy is presently the model utilized by [facility] and is a model recommended per current NCCN guidelines.    The Angela Model for Breast Cancer risk estimates the absolute 5 year risk and lifetime risk of developing breast cancer. Family history includes only first degree relatives with breast cancer, which is not enough information to estimate the risk of a patient having BRCA mutation. It also underestimates the cancer risk for patients with extensive family history. The Angela Model is a good predictor of risk for populations but not for individuals. It adjusts risk for race/ethnicity. It may underestimate breast cancer risk in patients with atypical hyperplasia and strong family history. The Angela Model was NOT designed to estimate risk for: Women with a prior diagnosis of breast cancer, lobular carcinoma in situ (LCIS), or ductal carcinoma in situ (DCIS);  Women who have received previous radiation therapy to the chest for treatment of Hodgkin lymphoma;  Women with gene mutations in BRCA1 or BRCA2, or those who are known to have certain genetic syndromes that increase risk for breast cancer; Women of age <35 or >85.    We  discussed that there are limitations to every model for risk assessment, particularly that TC can overestimate risk in women with atypical hyperplasia and dense breasts and that Angela underestimates risk for those with a strong family history of breast or ovarian cancers as well as non-white women with atypical hyperplasia which can make them appear to not be candidates for risk reducing therapies.              Recommendation for women with elevated TC score:      Recommendation for women with elevated Angela Model.             Risk factors are categorized into 2 groups: Modifiable and Non-modifiable. Modifiable risk factors include use of hormones, alcohol, smoking, diet and exercise. Non-modifiable risk factors include breast density, genetics, chest radiation, previous pregnancies, age of first period, and age of menopause.     Factors associated with greater breast cancer risk:  -Increasing age The risk of breast cancer increases with older age.  -Female sex  -White race (In the [place], the highest breast cancer risk occurs among White women, although breast cancer remains the most common cancer among women of every major ethnic/racial group )  -Weight and body fat in postmenopausal women -Obesity (defined as body mass index [BMI] ?30 kg/m2) is associated with an overall increase  in morbidity and mortality. However, the risk of breast cancer associated with BMI differs by menopausal status.   ?Postmenopausal women - A higher BMI and/or perimenopausal weight gain have been consistently associated with a higher risk of breast cancer among postmenopausal women. The association between a higher BMI and postmenopausal breast cancer risk may be mediated by higher estrogen levels resulting from the peripheral conversion of estrogen precursors (from adipose tissue) to estrogen    ?Inverse relationship in premenopausal women - Unlike postmenopausal women, an increased BMI is associated with a lower risk of breast  cancer in premenopausal women, particularly in early adulthood.  The explanation of this finding remains unclear.  -Tall stature -women who were >175 cm (69 inches) tall were 20 percent more likely to develop breast cancer than those <160 cm (63 inches) tall.  -Benign breast disease  -Dense breast tissue -- The density of breast tissue reflects the relative amount of glandular and connective tissue (parenchyma) to adipose tissue. Women with mammographically dense breast tissue, generally defined as dense tissue comprising ?75 percent of the breast, have a four to five times higher breast cancer risk compared with women of similar age with less or no dense tissue. Although breast density is a largely inherited trait, other factors can influence density. For example, lower density has been associated with higher levels of physical activity  and with a low-fat, high-carbohydrate diet. In postmenopausal women, estrogen and progesterone increase breast density  while the ER antagonist tamoxifen decreases breast density.  Despite the association of exogenous hormones with breast density, breast density is not strongly correlated with endogenous hormone levels. Breast tend to become more fatty with age.   -Bone mineral density -In multiple studies, women with higher bone density have a higher breast cancer risk  -Hormonal factors: HRT. Of note, IVF does not appear to increase the long-term risk of breast cancer, even in women with BRCA 1 and 2 mutations.     In the Women's Health Initiative (WHI), the risk of invasive breast cancer was significantly increased with combined hormone therapy (HT) at an average follow-up of 5.6 years.     A 2019 meta-analysis of all available epidemiologic evidence on the association between menopausal hormone therapy (MHT) use and breast cancer risk has been published. The analysis included nearly 145,000 women with breast cancer (51 percent of whom had used MHT) and nearly 425,000 without  breast cancer. Their findings included:  ?Similar to the WHI, estrogen-progestin regimens were associated with excess breast cancer risk. An excess risk was also seen with estrogen-only regimens (a reduction in risk was seen in the WHI). There was no excess risk with vaginal estrogens.   ?Breast cancer risk increased with the duration of systemic MHT use. Unlike previous studies, obesity was not associated with excess risk; instead, it attenuated risk.  ?The authors of the study calculated that for women of average weight, five years of MHT use starting at age 50 years would increase their 20-year risk of breast cancer (between the ages of 50 and 69 years) by approximately:  One in every 50 users of estrogen plus daily progestin  One in every 70 users of estrogen plus intermittent progestin   One in every 200 users of estrogen-only regimens   Of note: There were important limitations in this study.   While this meta-analysis has renewed concerns for some about the association between MHT and breast cancer, we continue to suggest an individualized approach when counseling symptomatic postmenopausal women about treatment. This includes putting the potential risk of breast cancer (and cardiovascular disease) in the context of the benefits of MHT (eg, relief of vasomotor symptoms, improved sleep and quality of life, and prevention of bone loss)  -Reproductive factors -Earlier menarche or later menopause, Nulliparity, Increasing age at first full-term pregnancy.   (Of note, It is estimated that for every 12 months of breastfeeding, there was a 4.3 percent reduction in the relative risk (RR) of breast cancer)  -Personal and family history of breast cancer  -Alcohol use and smoking  -Exposure to therapeutic ionizing radiation          LIFESTYLE MODIFICATIONS:    * Reviewed Lifestyle modifications which have shown benefit:  · Limit alcohol consumption to less than 1 drink per day (1 ounce liquor, 6 oz wine, 8 oz  beer)  · Avoid smoking.  · Exercise at least 150 minutes per week of moderate intensity aerobic activity or at least 75 minutes of vigorous activity. Exercise can lower the relative risk of breast cancer by ~18-20%.  · Maintain healthy weight and avoid post-menopausal weight gain. Avoid processed foods and eat more lean proteins, fruits and vegetables.                               * Discussed available resources including genetic counseling, nutrition, weight management.       CHEMOPREVENTION:    * For women at high risk for breast cancer, endocrine therapy can reduce the risk of invasive and/or in situ breast cancers. (tamoxifen for premenopausal or postmenopausal women and raloxifene or exemestane for postmenopausal women).   -Discussed that Tamoxifen 20 mg daily for 5 years has shown to reduce risk of breast cancer by 49% and women with ADH/ALH or LCIS have an even more significant reduction of risk of 86%. Aromatase inhibitors for 5 years have also shown risk reduction in terms of 50-60%. At current, there is not adequate data to recommend longer courses of therapy more than 5 years for risk reduction.    -Above have been shown to lower the risk of breast cancer incidence, however there is no survival benefit in patients who don't have breast cancer.   -Tamoxifen has limited data in BRCA 1/2 mutation carriers but limited retrospective data is suggestive of benefit. There is retrospective data that aromatase inhibitors can reduce the risk of contralateral ER positive breast cancers in BRCA 1/2 patients who were taking AIs as adjuvant therapy. There is no data for raloxifen in the population.    -Reviewed risks of Tamoxifen side effects include hot flashes, invasive endometrial cancer in women > 49 years of age (2.3/1000 compared to 0.9/1000), cataracts, increased risk of pulmonary embolism among others. A handout was given to her.      SCREENING:                * Reviewed future screening:   · Semiannual (every  6 months) CBE (clinical breast exam).   · Annual Breast MRI alternating with an annual MMG.     The use of MRI for breast cancer detection is based on the concept of  tumor angiogenesis or neovascularity. Tumor-associated blood vessels have increased permeability, which leads to prompt uptake and release of gadolinium within the first one to two minutes after administration, leading to a pattern of rapid enhancement and washout on MRI.   Bilateral breast examination - Both breasts should be evaluated in an MRI study, for comparison purposes, even when concern about possible pathology involves only one breast.  Contrast - Intravenous gadolinium contrast must be used to maximize cancer detection and is administered before breast MRI to highlight the neovascularity associated with cancers. Contrast is not necessary when the study is performed to evaluate silicone implant integrity.  Allergic and anaphylactoid reactions to gadolinium are rare, but can occur. In addition, in patients with renal failure, gadolinium can cause contrast nephropathy and/or nephrogenic systemic fibrosis.   A few studies have also reported gadolinium deposition in the brain from repeated intravenous administration, with the degree of deposition varying based on the specific contrast agent. The clinical significance of this deposition remains unknown, and no data for humans exist to show any adverse effects or harm at this time.   She understands that she may contact her insurance company with regards to coverage of MRI breasts.   She can not undergo an MRI if pregnant.   We discussed that MRI's may have false positives                    Plan:     1. Patient has opted out of chemoprevention but will call in the future if she wishes to pursue.   2. Patient elects to proceed with alternating annual mammogram and annual breast MRI along with semiannual CBEs.  3. Patient will follow up with PCP or GYN for one semiannual CBE and will RTC here for  one semiannual CBE.  4. Lifestyle modifications as detailed above.   5.   Encouraged breast awareness, including monthly breast self-exams.   6.   Referral to cancer genetics.    MRI breast 9/2022   RTC in 3/2023 to see me either at [facility] or on 3rd floor with a MMG same day at [facility]. I will see annually    Route Chart for Scheduling    Med Onc Chart Routing      Follow up with physician    Follow up with RAZIA . RTC in 3/2023 to see me either at [facility] or on 3rd floor with a MMG same day at [facility]   Labs    Imaging    MRI breast 9/2022; MMG 3/2023 - same day as EP   Pharmacy appointment    Other referrals Additional referrals needed            Questions were encouraged and answered to patient's satisfaction, and patient verbalized understanding of information and agreement with the plan. Advised patient to RTC with any interval changes or concerns.      Patient is in agreement with the proposed treatment plan. All questions were answered to the patient's satisfaction. Pt knows to call clinic for any new or worsening symptoms and if anything is needed before the next clinic visit.      RENETTA Betancur-JERALD  Hematology & Oncology  [address] [place] [id number]  ph. [phone]  Fax. [phone]     Face to Face time with patient: 45 minutes  60 minutes of total time spent on the encounter, which includes face to face time and non-face to face time preparing to see the patient (eg, review of tests), Obtaining and/or reviewing separately obtained history, Documenting clinical information in the electronic or other health record, Independently interpreting results (not separately reported) and communicating results to the patient/family/caregiver, or Care coordination (not separately reported).

## 2022-05-24 ENCOUNTER — PATIENT MESSAGE (OUTPATIENT)
Dept: HEMATOLOGY/ONCOLOGY | Facility: CLINIC | Age: 52
End: 2022-05-24
Payer: COMMERCIAL

## 2022-05-25 ENCOUNTER — PATIENT MESSAGE (OUTPATIENT)
Dept: HEMATOLOGY/ONCOLOGY | Facility: CLINIC | Age: 52
End: 2022-05-25
Payer: COMMERCIAL

## 2022-05-25 ENCOUNTER — TELEPHONE (OUTPATIENT)
Dept: HEMATOLOGY/ONCOLOGY | Facility: CLINIC | Age: 52
End: 2022-05-25
Payer: COMMERCIAL

## 2022-06-08 ENCOUNTER — PATIENT MESSAGE (OUTPATIENT)
Dept: HEMATOLOGY/ONCOLOGY | Facility: CLINIC | Age: 52
End: 2022-06-08
Payer: COMMERCIAL

## 2022-06-09 DIAGNOSIS — Z80.3 FAMILY HISTORY OF BREAST CANCER: Primary | ICD-10-CM

## 2022-06-17 ENCOUNTER — TELEPHONE (OUTPATIENT)
Dept: HEMATOLOGY/ONCOLOGY | Facility: CLINIC | Age: 52
End: 2022-06-17
Payer: COMMERCIAL

## 2022-08-05 ENCOUNTER — PATIENT MESSAGE (OUTPATIENT)
Dept: HEMATOLOGY/ONCOLOGY | Facility: CLINIC | Age: 52
End: 2022-08-05
Payer: COMMERCIAL

## 2022-08-09 ENCOUNTER — PATIENT MESSAGE (OUTPATIENT)
Dept: HEMATOLOGY/ONCOLOGY | Facility: CLINIC | Age: 52
End: 2022-08-09

## 2022-08-09 ENCOUNTER — OFFICE VISIT (OUTPATIENT)
Dept: HEMATOLOGY/ONCOLOGY | Facility: CLINIC | Age: 52
End: 2022-08-09
Payer: COMMERCIAL

## 2022-08-09 DIAGNOSIS — Z71.83 ENCOUNTER FOR NONPROCREATIVE GENETIC COUNSELING: Primary | ICD-10-CM

## 2022-08-09 DIAGNOSIS — Z80.3 FAMILY HISTORY OF BREAST CANCER: ICD-10-CM

## 2022-08-09 PROCEDURE — 99215 PR OFFICE/OUTPT VISIT, EST, LEVL V, 40-54 MIN: ICD-10-PCS | Mod: S$GLB,,, | Performed by: NURSE PRACTITIONER

## 2022-08-09 PROCEDURE — 99999 PR PBB SHADOW E&M-EST. PATIENT-LVL III: CPT | Mod: PBBFAC,,, | Performed by: NURSE PRACTITIONER

## 2022-08-09 PROCEDURE — 1159F PR MEDICATION LIST DOCUMENTED IN MEDICAL RECORD: ICD-10-PCS | Mod: CPTII,S$GLB,, | Performed by: NURSE PRACTITIONER

## 2022-08-09 PROCEDURE — 99999 PR PBB SHADOW E&M-EST. PATIENT-LVL III: ICD-10-PCS | Mod: PBBFAC,,, | Performed by: NURSE PRACTITIONER

## 2022-08-09 PROCEDURE — 1159F MED LIST DOCD IN RCRD: CPT | Mod: CPTII,S$GLB,, | Performed by: NURSE PRACTITIONER

## 2022-08-09 PROCEDURE — 99215 OFFICE O/P EST HI 40 MIN: CPT | Mod: S$GLB,,, | Performed by: NURSE PRACTITIONER
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
## 2022-08-16 ENCOUNTER — PATIENT MESSAGE (OUTPATIENT)
Dept: HEMATOLOGY/ONCOLOGY | Facility: CLINIC | Age: 52
End: 2022-08-16
Payer: COMMERCIAL

## 2022-08-16 DIAGNOSIS — Z80.3 FAMILY HISTORY OF BREAST CANCER: Primary | ICD-10-CM

## 2022-08-16 DIAGNOSIS — R92.30 DENSE BREAST TISSUE ON MAMMOGRAM: ICD-10-CM

## 2022-08-16 DIAGNOSIS — Z91.89 AT HIGH RISK FOR BREAST CANCER: ICD-10-CM

## 2022-08-17 ENCOUNTER — PATIENT MESSAGE (OUTPATIENT)
Dept: HEMATOLOGY/ONCOLOGY | Facility: CLINIC | Age: 52
End: 2022-08-17
Payer: COMMERCIAL

## 2022-11-28 NOTE — PROGRESS NOTES
Refer to plan of care section   [FreeTextEntry1] : 69 yr old male presents with dull, intermittent groin pain x 2 months. We discussed that this is likely musculoskeletal in nature and should resolve with rest. If pain is more bothersome then he can take NSAIDs such a ibuprofen PRN. He can also do warm soaks as needed. He will do PSA screening with his PCP, Dr. Jean. \par \par 1. NSAIDs PRN\par 2. Warm soaks\par 3. PSA with PCP \par 4. RTC PRN \par

## 2022-12-02 NOTE — PROGRESS NOTES
Doing well.  Discussed weight gain in pregnancy, appropriate at this time.  Patient signed up for [employer].  Traveling to [facility] 29-31 weeks.  SPADD today.  Considering meeting with CNM group.   Baby aspirin tolerating well.   Repeated TSH today. Iron levels per patient request.  All questions answered. F/U with me or ABC in 4 weeks.   Subjective:   CHIEF COMPLAINT / HPI:  73year old female with h/o adenoca LLL,s/p resection 2019. No further chemo or RT. IN June 2022,RIGHT FRONTAL LOBE LESION WHICH IS MET,S/P RIGHT FRONTAL CRANIOTOMY. She had some issues with extubation and had trach and peg about 6 months ago.  Now  want to know if this can be removed safely      Past Medical History:  Past Medical History:   Diagnosis Date    Cancer ([facility] [address]     Diabetes mellitus ([facility] [address]     Hyperlipidemia     Hypertension     Seizure ([facility] [address]        Past Surgical History:        Procedure Laterality Date    LUNG CANCER SURGERY Left 10/2019    TOE AMPUTATION Left 8/8/2022    LEFT FOOT HALLUX AMPUTATION EXCISIONAL DEBRIDEMENT ALL NON VIABLE   TISSUE AND BONE performed by Leena Mena DPM at . [address] N/A 10/3/2020    EGD BIOPSY performed by Sydnee Howell MD at [facility] ENDOSCOPY       Current Medications:    Current Facility-Administered Medications: sulfamethoxazole-trimethoprim (BACTRIM DS;SEPTRA DS) 800-160 MG per tablet 1 tablet, 1 tablet, PEG Tube, 2 times per day  haloperidol lactate (HALDOL) injection 1 mg, 1 mg, IntraVENous, Q6H PRN  morphine (PF) injection 2 mg, 2 mg, IntraVENous, Q4H PRN  sodium chloride flush 0.9 % injection 5-40 mL, 5-40 mL, IntraVENous, 2 times per day  sodium chloride flush 0.9 % injection 5-40 mL, 5-40 mL, IntraVENous, PRN  0.9 % sodium chloride infusion, , IntraVENous, PRN  ondansetron (ZOFRAN-ODT) disintegrating tablet 4 mg, 4 mg, Oral, Q8H PRN **OR** ondansetron (ZOFRAN) injection 4 mg, 4 mg, IntraVENous, Q6H PRN  acetaminophen (TYLENOL) tablet 650 mg, 650 mg, Oral, Q6H PRN **OR** acetaminophen (TYLENOL) suppository 650 mg, 650 mg, Rectal, Q6H PRN  pantoprazole (PROTONIX) 40 mg in sodium chloride (PF) 0.9 % 10 mL injection, 40 mg, IntraVENous, Daily  carvedilol (COREG) tablet 25 mg, 25 mg, Per G Tube, BID WC  folic acid (FOLVITE) tablet 1 mg, 1 mg, Per G Tube, Daily  insulin glargine (LANTUS) injection vial 20 Units, 20 Units, SubCUTAneous, Daily with breakfast  lactobacillus (CULTURELLE) capsule 1 capsule, 1 capsule, Per G Tube, Daily with breakfast  losartan (COZAAR) tablet 50 mg, 50 mg, Per G Tube, Daily  oxyCODONE (ROXICODONE) immediate release tablet 5 mg, 5 mg, Per G Tube, Q4H PRN  scopolamine (TRANSDERM-SCOP) transdermal patch 1 patch, 1 patch, TransDERmal, Q72H  thiamine tablet 100 mg, 100 mg, Per G Tube, Daily  insulin lispro (HUMALOG) injection vial 0-8 Units, 0-8 Units, SubCUTAneous, TID WC  insulin lispro (HUMALOG) injection vial 0-4 Units, 0-4 Units, SubCUTAneous, Nightly  glucose chewable tablet 16 g, 4 tablet, Oral, PRN  dextrose bolus 10% 125 mL, 125 mL, IntraVENous, PRN **OR** dextrose bolus 10% 250 mL, 250 mL, IntraVENous, PRN  glucagon (rDNA) injection 1 mg, 1 mg, SubCUTAneous, PRN  dextrose 10 % infusion, , IntraVENous, Continuous PRN  lacosamide (VIMPAT) 200 mg in sodium chloride 0.9 % 70 mL IVPB, 200 mg, IntraVENous, BID    No Known Allergies    Social History:    Social History     Socioeconomic History    Marital status:    Tobacco Use    Smoking status: Former     Packs/day: 1.00     Types: Cigarettes     Quit date: 2019     Years since quitting: 3.9    Smokeless tobacco: Never    Tobacco comments:     10/2019   Substance and Sexual Activity    Alcohol use: Yes     Alcohol/week: 1.0 standard drink     Types: 1 Cans of beer per week    Drug use: Yes     Types: Marijuana Fozia Coad)    Sexual activity: Yes     Partners: Male       Family History:   No family history on file. Immunization:    There is no immunization history on file for this patient. REVIEW OF SYSTEMS:    CONSTITUTIONAL:  negative for fevers, chills, diaphoresis, activity change, appetite change, fatigue, night sweats and unexpected weight change.    EYES:  negative for blurred vision, eye discharge, visual disturbance and icterus  HEENT:  negative for hearing loss, tinnitus, ear drainage, sinus pressure, nasal congestion, epistaxis and snoring  RESPIRATORY:  See HPI  CARDIOVASCULAR:  negative for chest pain, palpitations, exertional chest pressure/discomfort, edema, syncope  GASTROINTESTINAL:  negative for nausea, vomiting, diarrhea, constipation, blood in stool and abdominal pain  GENITOURINARY:  negative for frequency, dysuria and hematuria  HEMATOLOGIC/LYMPHATIC:  negative for easy bruising, bleeding and lymphadenopathy  ALLERGIC/IMMUNOLOGIC:  negative for recurrent infections, angioedema, anaphylaxis and drug reactions  ENDOCRINE:  negative for weight changes and diabetic symptoms including polyuria, polydipsia and polyphagia    MUSCULOSKELETAL:  negative for  pain, joint swelling, decreased range of motion and muscle weakness  NEUROLOGICAL:  negative for headaches, slurred speech, unilateral weakness  PSYCHIATRIC/BEHAVIORAL: negative for hallucinations, behavioral problems, confusion and agitation. Objective:   PHYSICAL EXAM:      VITALS:    Vitals:    12/02/22 0300 12/02/22 0400 12/02/22 0500 12/02/22 0600   BP: 130/60 (!) 105/56     Pulse: 80 68 74 71   Resp: 28 15 19 15   Temp: 97.9 °F (36.6 °C)      TempSrc: Oral      SpO2: 98%      Height:             CONSTITUTIONAL:  awake, alert, cooperative, no apparent distress, and appears stated age  NECK:  Supple, symmetrical, trachea midline, no adenopathy, thyroid symmetric, not enlarged and no tenderness  CHEST: Chest expansion equal and symmetrical, no intercostal retraction. LUNGS:  no increased work of breathing, has expiratory wheezes both lungs, no crackles. CARDIOVASCULAR: S1 and S2, no edema and no JVD  ABDOMEN:  normal bowel sounds, non-distended and no masses palpated, and no tenderness to palpation. No hepatospleenomegaly  LYMPHADENOPATHY:  no axillary or supraclavicular adenopathy. No   MUSCULOSKELETAL: No obvious misalignment or effusion of the joints. No clubbing, cyanosis of the digits.   RIGHT AND LEFT LOWER EXTREMITIES: No edema, no inflammation, no tenderness. SKIN:  normal skin color, texture, turgor and no redness, warmth, or swelling. No palpable nodules    DATA:         EXAMINATION:   ONE XRAY VIEW OF THE CHEST       12/1/2022 3:09 pm       COMPARISON:   11/30/2022       HISTORY:   ORDERING SYSTEM PROVIDED HISTORY: n/v, possible aspiration   TECHNOLOGIST PROVIDED HISTORY:   Reason for exam:->n/v, possible aspiration   Reason for Exam: n/v, possible aspiration       FINDINGS:   A tracheostomy cannula and right-sided Port-A-Cath remain. The heart size is   within normal limits. There is no pneumothorax, edema or new consolidation. Clips are noted in the left hilum. Impression   No significant interval change. No evidence of pneumonia. Assessment:     LLL adenoca s/p resection 2019  Right frontal mets,s/p craniotomy June 2022  S/p trach and peg June 2022          Plan:     D/w  at length  Cpm   is explained that consideing her underlying metastatic lung ca,she is prone to infections which causes more secretions and it will be easy to suction out those secretions if she has trach,tumor metastatic can come back.  Told him that it will be safer if we keep trach in and reevaluate in 6 months. agree  Thanks will fu as outpt

## 2022-12-16 ENCOUNTER — HOSPITAL ENCOUNTER (OUTPATIENT)
Dept: RADIOLOGY | Facility: CLINIC | Age: 52
Discharge: HOME | End: 2022-12-16
Attending: INTERNAL MEDICINE
Payer: COMMERCIAL

## 2022-12-16 DIAGNOSIS — M85.89 OSTEOPENIA OF MULTIPLE SITES: ICD-10-CM

## 2022-12-16 PROCEDURE — 77080 DXA BONE DENSITY AXIAL: CPT | Mod: 26,,, | Performed by: INTERNAL MEDICINE

## 2022-12-16 PROCEDURE — 77080 DXA BONE DENSITY AXIAL: CPT | Mod: TC

## 2022-12-16 PROCEDURE — 77080 DEXA BONE DENSITY SPINE HIP: ICD-10-PCS | Mod: 26,,, | Performed by: INTERNAL MEDICINE

## 2023-01-03 ENCOUNTER — PATIENT MESSAGE (OUTPATIENT)
Dept: ENDOCRINOLOGY | Facility: CLINIC | Age: 53
End: 2023-01-03
Payer: COMMERCIAL

## 2023-04-20 ENCOUNTER — PATIENT MESSAGE (OUTPATIENT)
Dept: UROGYNECOLOGY | Facility: CLINIC | Age: 53
End: 2023-04-20
Payer: COMMERCIAL

## 2023-04-20 DIAGNOSIS — N39.41 URGE INCONTINENCE: ICD-10-CM

## 2023-04-20 RX ORDER — CONJUGATED ESTROGENS 0.62 MG/G
CREAM VAGINAL
Qty: 45 G | Refills: 0 | Status: SHIPPED | OUTPATIENT
Start: 2023-04-20

## 2023-04-20 RX ORDER — SOLIFENACIN SUCCINATE 10 MG/1
10 TABLET, FILM COATED ORAL DAILY
Qty: 30 TABLET | Refills: 1 | Status: SHIPPED | OUTPATIENT
Start: 2023-04-20 | End: 2023-05-20

## 2023-04-20 NOTE — TELEPHONE ENCOUNTER
----- Message from Peggy Cerda sent at 4/20/2023 12:29 PM CDT -----   Name of Who is Calling:     What is the request in detail:  patient request call back in reference to schedule annual appointment Please contact to further discuss and advise      Can the clinic reply by [facility]:     What Number to Call Back if not in [facility]:  [phone]

## 2023-04-26 RX ORDER — SOLIFENACIN SUCCINATE 10 MG/1
10 TABLET, FILM COATED ORAL DAILY
Qty: 60 TABLET | Refills: 1 | OUTPATIENT
Start: 2023-04-26 | End: 2023-05-26

## 2023-04-30 RX ORDER — SOLIFENACIN SUCCINATE 10 MG/1
10 TABLET, FILM COATED ORAL DAILY
Qty: 90 TABLET | Refills: 0 | Status: SHIPPED | OUTPATIENT
Start: 2023-04-30 | End: 2023-06-20 | Stop reason: SDUPTHER

## 2023-06-20 ENCOUNTER — OFFICE VISIT (OUTPATIENT)
Dept: UROGYNECOLOGY | Facility: CLINIC | Age: 53
End: 2023-06-20
Payer: COMMERCIAL

## 2023-06-20 VITALS
BODY MASS INDEX: 21.12 KG/M2 | DIASTOLIC BLOOD PRESSURE: 75 MMHG | SYSTOLIC BLOOD PRESSURE: 119 MMHG | WEIGHT: 138.88 LBS | HEART RATE: 67 BPM

## 2023-06-20 DIAGNOSIS — N39.41 URGE INCONTINENCE: Primary | ICD-10-CM

## 2023-06-20 DIAGNOSIS — N81.6 POSTERIOR VAGINAL WALL PROLAPSE: ICD-10-CM

## 2023-06-20 DIAGNOSIS — N81.10 PROLAPSE OF ANTERIOR VAGINAL WALL: ICD-10-CM

## 2023-06-20 PROCEDURE — 3008F BODY MASS INDEX DOCD: CPT | Mod: CPTII,S$GLB,, | Performed by: OBSTETRICS & GYNECOLOGY

## 2023-06-20 PROCEDURE — 3074F SYST BP LT 130 MM HG: CPT | Mod: CPTII,S$GLB,, | Performed by: OBSTETRICS & GYNECOLOGY

## 2023-06-20 PROCEDURE — 99214 OFFICE O/P EST MOD 30 MIN: CPT | Mod: S$GLB,,, | Performed by: OBSTETRICS & GYNECOLOGY

## 2023-06-20 PROCEDURE — 1159F PR MEDICATION LIST DOCUMENTED IN MEDICAL RECORD: ICD-10-PCS | Mod: CPTII,S$GLB,, | Performed by: OBSTETRICS & GYNECOLOGY

## 2023-06-20 PROCEDURE — 3078F DIAST BP <80 MM HG: CPT | Mod: CPTII,S$GLB,, | Performed by: OBSTETRICS & GYNECOLOGY

## 2023-06-20 PROCEDURE — 99999 PR PBB SHADOW E&M-EST. PATIENT-LVL III: ICD-10-PCS | Mod: PBBFAC,,, | Performed by: OBSTETRICS & GYNECOLOGY

## 2023-06-20 PROCEDURE — 99214 PR OFFICE/OUTPT VISIT, EST, LEVL IV, 30-39 MIN: ICD-10-PCS | Mod: S$GLB,,, | Performed by: OBSTETRICS & GYNECOLOGY

## 2023-06-20 PROCEDURE — 99999 PR PBB SHADOW E&M-EST. PATIENT-LVL III: CPT | Mod: PBBFAC,,, | Performed by: OBSTETRICS & GYNECOLOGY

## 2023-06-20 PROCEDURE — 3074F PR MOST RECENT SYSTOLIC BLOOD PRESSURE < 130 MM HG: ICD-10-PCS | Mod: CPTII,S$GLB,, | Performed by: OBSTETRICS & GYNECOLOGY

## 2023-06-20 PROCEDURE — 3008F PR BODY MASS INDEX (BMI) DOCUMENTED: ICD-10-PCS | Mod: CPTII,S$GLB,, | Performed by: OBSTETRICS & GYNECOLOGY

## 2023-06-20 PROCEDURE — 3078F PR MOST RECENT DIASTOLIC BLOOD PRESSURE < 80 MM HG: ICD-10-PCS | Mod: CPTII,S$GLB,, | Performed by: OBSTETRICS & GYNECOLOGY

## 2023-06-20 PROCEDURE — 1159F MED LIST DOCD IN RCRD: CPT | Mod: CPTII,S$GLB,, | Performed by: OBSTETRICS & GYNECOLOGY

## 2023-06-20 RX ORDER — SOLIFENACIN SUCCINATE 10 MG/1
10 TABLET, FILM COATED ORAL DAILY
Qty: 90 TABLET | Refills: 3 | Status: SHIPPED | OUTPATIENT
Start: 2023-06-20 | End: 2024-06-19

## 2023-06-20 NOTE — PROGRESS NOTES
Subjective:       Patient ID: Dipti Pacheco is a 53 y.o. female.    Chief Complaint:  Follow-up        History of Present Illness   53 Y O F P1   has a past medical history of Allergy, Anxiety, Disorder of muscle (2019), Iron deficiency anemia secondary to inadequate dietary iron intake (2019), and Subclinical hypothyroidism. referred by NP Kristin Wallace for pelvic organ prolapse and fecal incompetence. She began in after She began Kegel exercise early on but stopped and has now restarted. She had a induced due to non fetal heart tracing which lead to an pulido balloon induction of labor at 37 wks IVF pregnancy. She had a  approximately 38 hours in labor no forceps no vacuum. Immediately after the delivery there was very little sensation of the pelvis. She denies previous episodes of fecal incontinence, since the delivery it ranges from daily to every others day. The amount varies from a small amount to large amounts especially more bothersome when it's more loose.      Interval follow up:   Continues to perform pelvic floor PT on her own feels like her symptoms have improved.     Interval  HP since the last visit   1)  UI:  (--) FLAQUITO  (+) UUI  - at night if bladder too full.  (--) pads:.  Daytime frequency: Q 4 -5 hours.  Nocturia: No:    (-) dysuria-  (--) hematuria,  (--) frequent UTIs.  (+) complete bladder emptying.     2)  POP:   Symptoms:(+) : prolapse managed with  the pessary.  (--) vaginal bleeding. (--) vaginal discharge. (-) sexually active.  (--) dyspareunia.   (--)  Vaginal dryness.  (--) vaginal estrogen use.     3)  BM:  (+) constipation/straining.  (--) chronic diarrhea. (--) hematochezia.  (--) fecal incontinence.  (--) fecal smearing/urgency.  (--) incomplete evacuation.      4)Pessary- denies pain,bleeding or current discharge. Using # 6 IC ring   does not always like the way it feels when in place. Able to remove and place on a bimonthly basis     3/24/2022:  Doing well  continues to work with Chaparrita  Using the pessary as needed  Symptoms of vaginal bulge much improved    6/20/2023:  Continues to do well using the pessary which is now more discolored   Working with Chaparrita on back and shoulder issues  Symptoms overall continue to have stabilized        [facility]     Question 8/21/2019  9:25 PM CDT - Filed by Patient on 8/21/2019   General Urogynecology: Are you experiencing the following?    Dysuria (painful urination) No   Nocturia:  waking up at night to empty your bladder  Yes   If you answered yes to the previous question, how many times does this happen per night? 3-4   Enuresis (urine loss during sleep) No   Dribbling urine after you urinate No   Hematuria (urine appears red) No   Type of stream Weak   Urinary frequency: How often a day are you going to the bathroom per day?  10-15   Urinary Tract Infections: How many Urinary Tract Infections have you had in the past year? I have not had a UTI in the past year   If you have had a UTI in the past year, what treatments have you had so far?  I have not had a UTI in the past year   Urinary Incontinence (General): Are you experiencing the following?    Past consultation for incontinence: Have you ever seen someone for the evaluation of incontinence? No   If you answered yes to the previous question, please select all the therapies you have tried.  N/a- I answered no to the previous question   Please note the effectiveness of the therapies.    Need to wear protection to keep clothes dry  Yes   If you answered yes to the previous question, please basilia the protection you use.  Panty liner   If you wear protection, how much wetness is typically on each pad? Slight   If you wear protection, how often do you have to change per day, if applicable?  2   Stress Symptoms: Are you experiencing the following?    Leakage of urine with cough, laugh and/or sneeze No   If you answered yes to the previous question, what is the frequency  "in days, weeks and/or months? Never   Leakage of urine with sex No   Leakage of urine with bending/ lifting No   Leakage of urine with briskly walking or jogging No   If you lose urine for any other reason not previously mentioned, please note it below, if applicable.  n/a   Urge Symptoms: Are you experiencing the following?    Urgency ("got to go" feeling) Yes   Urge: How frequently do you feel an urge to urinate (feeling like you "gotta go" to the bathroom and can't wait) Several times a day   Do you experience a leakage of urine when you have a feeling of urgency?  Yes   Leakage of urine when unaware No   Past use of anticholinergics (medications used to treat overactive bladder) No   If you answered yes to the previous question, please basilia the anticholinergics you have used:     Have you ever used Mirbetriq (aka Mirabegron)?  No   Prolapse Symptoms: Are you experiencing any of the following?     Falling out/ Bulging/ Heaviness in the vagina No   Vaginal/ Abdominal Pain/ Pressure No   Need to strain/ Push to void No   Need to wait on the toilet before you void No   Unusual position to urinate (using your hands to push back the vaginal bulge) No   Sensation of incomplete emptying Yes   Past use of pessary device No   If you answered yes to the previous question, please list the devices you have used below.  n/a   Bowel Symptoms: Are you experiencing any of the following?    Constipation No   Diarrhea  No   Hematochezia (bloody stool) No   Incomplete evacuation of stool No   Involuntary loss of formed stool No   Fecal smearing/urgency Yes   Involuntary loss of gas Yes   Vaginal Symptoms: Are you experiencing any of the following?     Abnormal vaginal bleeding  No   Vaginal dryness No   Sexually active  No   Dyspareunia (painful intercourse) No   Estrogen use  No     GYN & OB History  No LMP recorded.   Date of Last Pap: 2020    OB History    Para Term  AB Living   1 1 1     1   SAB IAB Ectopic " Multiple Live Births         0 1      # Outcome Date GA Lbr Anurag/2nd Weight Sex Delivery Anes PTL Lv   1 Term 07/18/19 37w3d  2.8 kg (6 lb 2.8 oz) M Vag-Spont EPI N GILLIAN      Complications: Chorioamnionitis     OB  Largest: 6#  Forceps:  Episiotomy: 2nd degree    GYN  Menarche: 15  Menstrual cycle: nomral  Menopause: amenorrhea prior to pregnancy   Hysterectomy: no  Ovaries: present       Review of Systems  Review of Systems   Constitutional:  Negative for activity change, appetite change, chills, diaphoresis, fatigue, fever and unexpected weight change.   Respiratory:  Negative for cough.    Gastrointestinal:  Negative for abdominal distention, abdominal pain, anal bleeding, blood in stool, constipation, diarrhea, nausea, rectal pain and vomiting.   Genitourinary:  Negative for decreased urine volume, difficulty urinating, dyspareunia, dysuria, enuresis, flank pain, frequency, genital sores, hematuria, menstrual problem, pelvic pain, urgency, vaginal bleeding, vaginal discharge and vaginal pain.   Musculoskeletal:  Negative for back pain.   Skin:  Negative for color change, pallor, rash and wound.   Allergic/Immunologic: Negative for environmental allergies, food allergies and immunocompromised state.   Hematological:  Negative for adenopathy. Does not bruise/bleed easily.   Psychiatric/Behavioral:  Negative for agitation, behavioral problems, confusion and sleep disturbance.          Objective:     Physical Exam   Constitutional: She is oriented to person, place, and time. She appears well-developed and well-nourished.   HENT:   Head: Normocephalic and atraumatic.   Pulmonary/Chest: No respiratory distress. She has no wheezes.   Musculoskeletal:         General: Normal range of motion.      Cervical back: Normal range of motion and neck supple.   Neurological: She is alert and oriented to person, place, and time.   Skin: No cyanosis. Nails show no clubbing.   Psychiatric: She has a normal mood and affect. Her  behavior is normal. Judgment and thought content normal.        Pessary evaluated has a biofilm recommend changing to a new pessary     POPQ  -1 -1 -5  3 3  9  0 0 -6     HCM  Pap's: normal   Mammo: normal 2018  Colonoscopy more than 10 years, normal   Dexa: n/a    Assessment:        1. Urge incontinence    2. Prolapse of anterior vaginal wall    3. Posterior vaginal wall prolapse               Plan:     1.  Mixed urinary incontinence, urge > stress:  urge incontinence resolved with vesicare   --Empty bladder every 3 hours.  Empty well: wait a minute, lean forward on toilet.    --Avoid dietary irritants (see sheet).  Keep diary x 3-5 days to determine your irritants.  --KEGELS: do 10 in AM and 10 in PM, holding each x 10 seconds.  When you feel urge to go, STOP, KEGEL, and when urge has passed, then go to bathroom.    --URGE: reordered vesicare 10 mg tablet daily   --STRESS:  Pessary vs. Sling vs impressa (stress incontinence made worse with the pessary in place)    2  Fecal incontinence/ incomplete bowel emptying likely secondary to outlet obstruction given her rectocel: over all resolved rare splinting and constipation with travel.   Reviewed options including, magnesium,  fiber stool softener etc.     3. Prolapse: [place] continues to be well supported; Stage 2 anterior and posterior vaginal wall prolapse much improved working with leoncio Sethi  --,using the pessary needs a new pessary, ordered with betty- #6 ring with support   --Daily pelvic floor exercises as assigned  --Non surgical options discussed with patient    --Surgical options also discussed, she's more interested in the non-surgical treatment options for now.     Approximately 35 min were spent in consult, 90 % in discussion.     Laury Casillas DO  Female Pelvic Medicine and Reconstructive Surgery  [facility] [place], [place]

## 2023-06-20 NOTE — PATIENT INSTRUCTIONS
1.  Mixed urinary incontinence, urge > stress: now urge incontinence more bothersome    --Empty bladder every 3 hours.  Empty well: wait a minute, lean forward on toilet.    --Avoid dietary irritants (see sheet).  Keep diary x 3-5 days to determine your irritants.  --KEGELS: do 10 in AM and 10 in PM, holding each x 10 seconds.  When you feel urge to go, STOP, KEGEL, and when urge has passed, then go to bathroom.    --URGE: reordered vesicare 10 mg tablet daily   --STRESS:  Pessary vs. Sling vs impressa (stress incontinence made worse with the pessary in place)    2  Fecal incontinence/ incomplete bowel emptying likely secondary to outlet obstruction given her rectocel: over all resolved   --Fiber may better control cholesterol and blood glucose.  daily fiber.  Take 1 tsp of fiber powder (psyllium or other sugar-free powder).  Mix in 8 oz of water.  Take x 3-5 days.  Then, increase fiber by 1 tsp every 3-5 days until stool is easy to pass.  ( Metamucil, benefiber )   --Keep a bowel diary   -- consider Lomotil in the future  -- S/P Pelvic floor PT working with Leoncio Sethi   --Endoanal sonogram to evaluate the sphincter   --Anal manometry   --We discussed treatment option including Pelvic floor PT, SNS, and Sphincteroplasty. Risks, benefits reviewed in detail. She has worked with [facility], with mild improvement, she has recently started working with Leoncio at [facility]   -- s/p Colorectal evaluation     3. Prolapse: Stage 2 anterior and posterior vaginal wall prolapse much improved working with leoncio Sethi  --,using the pessary needs a new pessary, ordered with Piggybackrequ   --Daily pelvic floor exercises as assigned  --Non surgical options discussed with patient    --Surgical options also discussed, she's more interested in the non-surgical treatment options for now.

## 2023-08-23 ENCOUNTER — TELEPHONE (OUTPATIENT)
Dept: UROGYNECOLOGY | Facility: CLINIC | Age: 53
End: 2023-08-23
Payer: COMMERCIAL

## 2023-08-23 NOTE — TELEPHONE ENCOUNTER
[facility] called patient to inform that the pessary was out of stock , pt states she canceled the order and the old link briones not work. New order link has been sent to email.

## 2023-08-24 ENCOUNTER — PATIENT MESSAGE (OUTPATIENT)
Dept: OPTOMETRY | Facility: CLINIC | Age: 53
End: 2023-08-24

## 2023-08-24 ENCOUNTER — OFFICE VISIT (OUTPATIENT)
Dept: OPTOMETRY | Facility: CLINIC | Age: 53
End: 2023-08-24
Payer: COMMERCIAL

## 2023-08-24 DIAGNOSIS — H43.393 VITREOUS FLOATERS, BILATERAL: ICD-10-CM

## 2023-08-24 DIAGNOSIS — H52.4 PRESBYOPIA: ICD-10-CM

## 2023-08-24 DIAGNOSIS — Z04.9 DISEASE RULED OUT AFTER EXAMINATION: ICD-10-CM

## 2023-08-24 DIAGNOSIS — H47.393 OPTIC NERVE CUPPING OF BOTH EYES: Primary | ICD-10-CM

## 2023-08-24 PROCEDURE — 99999 PR PBB SHADOW E&M-EST. PATIENT-LVL III: CPT | Mod: PBBFAC,,, | Performed by: OPTOMETRIST

## 2023-08-24 PROCEDURE — 92133 POSTERIOR SEGMENT OCT OPTIC NERVE(OCULAR COHERENCE TOMOGRAPHY) - OU - BOTH EYES: ICD-10-PCS | Mod: S$GLB,,, | Performed by: OPTOMETRIST

## 2023-08-24 PROCEDURE — 99214 OFFICE O/P EST MOD 30 MIN: CPT | Mod: S$GLB,,, | Performed by: OPTOMETRIST

## 2023-08-24 PROCEDURE — 1159F PR MEDICATION LIST DOCUMENTED IN MEDICAL RECORD: ICD-10-PCS | Mod: CPTII,S$GLB,, | Performed by: OPTOMETRIST

## 2023-08-24 PROCEDURE — 92133 CPTRZD OPH DX IMG PST SGM ON: CPT | Mod: S$GLB,,, | Performed by: OPTOMETRIST

## 2023-08-24 PROCEDURE — 99999 PR PBB SHADOW E&M-EST. PATIENT-LVL III: ICD-10-PCS | Mod: PBBFAC,,, | Performed by: OPTOMETRIST

## 2023-08-24 PROCEDURE — 99214 PR OFFICE/OUTPT VISIT, EST, LEVL IV, 30-39 MIN: ICD-10-PCS | Mod: S$GLB,,, | Performed by: OPTOMETRIST

## 2023-08-24 PROCEDURE — 1159F MED LIST DOCD IN RCRD: CPT | Mod: CPTII,S$GLB,, | Performed by: OPTOMETRIST

## 2023-10-17 ENCOUNTER — HOSPITAL ENCOUNTER (EMERGENCY)
Facility: OTHER | Age: 53
Discharge: HOME | End: 2023-10-17
Attending: EMERGENCY MEDICINE
Payer: COMMERCIAL

## 2023-10-17 VITALS
WEIGHT: 141.13 LBS | HEIGHT: 68 IN | TEMPERATURE: 99 F | HEART RATE: 60 BPM | BODY MASS INDEX: 21.39 KG/M2 | DIASTOLIC BLOOD PRESSURE: 88 MMHG | SYSTOLIC BLOOD PRESSURE: 156 MMHG | RESPIRATION RATE: 14 BRPM | OXYGEN SATURATION: 97 %

## 2023-10-17 DIAGNOSIS — R07.89 ATYPICAL CHEST PAIN: Primary | ICD-10-CM

## 2023-10-17 DIAGNOSIS — R07.9 CHEST PAIN: ICD-10-CM

## 2023-10-17 LAB
ALBUMIN SERPL BCP-MCNC: 3.7 G/DL (ref 3.5–5.2)
ALP SERPL-CCNC: 47 U/L (ref 55–135)
ALT SERPL W/O P-5'-P-CCNC: 35 U/L (ref 10–44)
ANION GAP SERPL CALC-SCNC: 7 MMOL/L (ref 8–16)
AST SERPL-CCNC: 25 U/L (ref 10–40)
BASOPHILS # BLD AUTO: 0.04 K/UL (ref 0–0.2)
BASOPHILS NFR BLD: 0.9 % (ref 0–1.9)
BILIRUB SERPL-MCNC: 0.3 MG/DL (ref 0.1–1)
BUN SERPL-MCNC: 17 MG/DL (ref 6–20)
CALCIUM SERPL-MCNC: 9.2 MG/DL (ref 8.7–10.5)
CHLORIDE SERPL-SCNC: 103 MMOL/L (ref 95–110)
CO2 SERPL-SCNC: 25 MMOL/L (ref 23–29)
CREAT SERPL-MCNC: 0.8 MG/DL (ref 0.5–1.4)
DIFFERENTIAL METHOD: ABNORMAL
EOSINOPHIL # BLD AUTO: 0.1 K/UL (ref 0–0.5)
EOSINOPHIL NFR BLD: 1.6 % (ref 0–8)
ERYTHROCYTE [DISTWIDTH] IN BLOOD BY AUTOMATED COUNT: 13.2 % (ref 11.5–14.5)
EST. GFR  (NO RACE VARIABLE): >60 ML/MIN/1.73 M^2
GLUCOSE SERPL-MCNC: 91 MG/DL (ref 70–110)
HCT VFR BLD AUTO: 34.8 % (ref 37–48.5)
HCV AB SERPL QL IA: NEGATIVE
HGB BLD-MCNC: 11.5 G/DL (ref 12–16)
HIV 1+2 AB+HIV1 P24 AG SERPL QL IA: NEGATIVE
IMM GRANULOCYTES # BLD AUTO: 0.01 K/UL (ref 0–0.04)
IMM GRANULOCYTES NFR BLD AUTO: 0.2 % (ref 0–0.5)
LYMPHOCYTES # BLD AUTO: 1.6 K/UL (ref 1–4.8)
LYMPHOCYTES NFR BLD: 35.5 % (ref 18–48)
MCH RBC QN AUTO: 29.6 PG (ref 27–31)
MCHC RBC AUTO-ENTMCNC: 33 G/DL (ref 32–36)
MCV RBC AUTO: 90 FL (ref 82–98)
MONOCYTES # BLD AUTO: 0.4 K/UL (ref 0.3–1)
MONOCYTES NFR BLD: 9.2 % (ref 4–15)
NEUTROPHILS # BLD AUTO: 2.4 K/UL (ref 1.8–7.7)
NEUTROPHILS NFR BLD: 52.6 % (ref 38–73)
NRBC BLD-RTO: 0 /100 WBC
PLATELET # BLD AUTO: 218 K/UL (ref 150–450)
PMV BLD AUTO: 9.3 FL (ref 9.2–12.9)
POTASSIUM SERPL-SCNC: 4.7 MMOL/L (ref 3.5–5.1)
PROT SERPL-MCNC: 6.8 G/DL (ref 6–8.4)
RBC # BLD AUTO: 3.89 M/UL (ref 4–5.4)
SODIUM SERPL-SCNC: 135 MMOL/L (ref 136–145)
TROPONIN I SERPL DL<=0.01 NG/ML-MCNC: <0.006 NG/ML (ref 0–0.03)
WBC # BLD AUTO: 4.48 K/UL (ref 3.9–12.7)

## 2023-10-17 PROCEDURE — 93005 ELECTROCARDIOGRAM TRACING: CPT

## 2023-10-17 PROCEDURE — 87389 HIV-1 AG W/HIV-1&-2 AB AG IA: CPT | Performed by: EMERGENCY MEDICINE

## 2023-10-17 PROCEDURE — 25000003 PHARM REV CODE 250: Performed by: EMERGENCY MEDICINE

## 2023-10-17 PROCEDURE — 80053 COMPREHEN METABOLIC PANEL: CPT | Performed by: EMERGENCY MEDICINE

## 2023-10-17 PROCEDURE — 93010 EKG 12-LEAD: ICD-10-PCS | Mod: ,,, | Performed by: INTERNAL MEDICINE

## 2023-10-17 PROCEDURE — 84484 ASSAY OF TROPONIN QUANT: CPT | Performed by: EMERGENCY MEDICINE

## 2023-10-17 PROCEDURE — 93010 ELECTROCARDIOGRAM REPORT: CPT | Mod: ,,, | Performed by: INTERNAL MEDICINE

## 2023-10-17 PROCEDURE — 86803 HEPATITIS C AB TEST: CPT | Performed by: EMERGENCY MEDICINE

## 2023-10-17 PROCEDURE — 85025 COMPLETE CBC W/AUTO DIFF WBC: CPT | Performed by: EMERGENCY MEDICINE

## 2023-10-17 PROCEDURE — 99285 EMERGENCY DEPT VISIT HI MDM: CPT | Mod: 25

## 2023-10-17 RX ORDER — ASPIRIN 325 MG
325 TABLET ORAL
Status: COMPLETED | OUTPATIENT
Start: 2023-10-17 | End: 2023-10-17

## 2023-10-17 RX ADMIN — ASPIRIN 325 MG ORAL TABLET 325 MG: 325 PILL ORAL at 12:10

## 2023-10-17 NOTE — ED TRIAGE NOTES
"Pt presents to the ER with complaints of a "pinching sensation" in the left side of the chest since Saturday. Pt states sensation is progressively getting worse and is becoming more constant. Pt denies injury; states sensation is not affected by movement or deep breath. Pt denies other symptoms.    "

## 2023-10-17 NOTE — ED PROVIDER NOTES
"Encounter Date: 10/17/2023    SCRIBE #1 NOTE: I, Ursula Kamilahday, am scribing for, and in the presence of,  Sury Cunningham MD. I have scribed the following portions of the note - Other sections scribed: HPI, ROS, PE.       History     Chief Complaint   Patient presents with    Chest Pain     L sided CP x2-3 days, intermittent, denies aggravating factors.      Time seen by physician: 12:20 PM    This is a 53 y.o. female who presents with complaint of left-sided chest pain for the past 3 days, after being told to come in to the ED by her PCP. Pt describes the pain as "pinching" and intermittent today, but sustained and stronger yesterday. When the pain began, it was very mild. She states that the pain is not moving or radiating. Pt has been able to work out, which she does daily, without exacerbating the pain. She is unsure whether the pain is due to muscle strain. She also states that eating and deep breaths do not exacerbate the pain. She denies shortness of breath.  History of pain similar to this.  She has not taken anything for pain.  Patient denies smoking or drugs, and endorses only occasional drinking. She denies a history of GERD or heart problems. Pt is slightly hearing impaired. This is the extent of the patient's complaints at this time.          The history is provided by the patient and medical records. No  was used.     Review of patient's allergies indicates:   Allergen Reactions    Aspirin Nausea Only and Nausea And Vomiting     Powdered form only   *powder form* when little     Past Medical History:   Diagnosis Date    Allergy     Anxiety     Disorder of muscle 9/4/2019    Iron deficiency anemia secondary to inadequate dietary iron intake 7/19/2019    Subclinical hypothyroidism      Past Surgical History:   Procedure Laterality Date    COLONOSCOPY N/A 6/17/2020    Procedure: COLONOSCOPY, with me;  Surgeon: Karina Carreon MD;  Location: [facility] ([address] [place] [address]);  Service: Endoscopy; "  Laterality: N/A;  prep ins emailed - [facility]  COCVID screening 6/15/20 - [facility]    dental implants      Egg retrieval       Family History   Problem Relation Age of Onset    Diabetes Mellitus Paternal Grandmother     Alcohol abuse Paternal Grandfather     Breast cancer Mother 71        unilat; HR+    No Known Problems Son     Depression Other     Alcohol abuse Other     Lung disease Paternal Cousin     Melanoma Neg Hx      Social History     Tobacco Use    Smoking status: Never    Smokeless tobacco: Never   Substance Use Topics    Alcohol use: Yes     Comment: socially    Drug use: No     Review of Systems   Constitutional:  Negative for chills, fever and unexpected weight change.   HENT:  Negative for congestion, nosebleeds and rhinorrhea.    Eyes:  Negative for pain.   Respiratory:  Negative for apnea, chest tightness and shortness of breath.    Cardiovascular:  Positive for chest pain. Negative for palpitations.   Gastrointestinal:  Negative for abdominal pain, constipation, diarrhea, nausea and vomiting.   Genitourinary:  Negative for dysuria, enuresis and flank pain.   Musculoskeletal:  Negative for back pain and neck pain.   Skin:  Negative for color change, pallor and wound.   Neurological:  Negative for dizziness and headaches.   Hematological:  Does not bruise/bleed easily.   Psychiatric/Behavioral:  Negative for sleep disturbance.        Physical Exam     Initial Vitals [10/17/23 1205]   BP Pulse Resp Temp SpO2   138/83 67 19 98.5 °F (36.9 °C) 99 %      MAP       --         Physical Exam    Nursing note and vitals reviewed.  Constitutional: She appears well-developed and well-nourished.   HENT:   Head: Normocephalic and atraumatic.   Eyes: Conjunctivae are normal.   Neck: Neck supple.   Normal range of motion.  Cardiovascular:  Normal rate, regular rhythm and normal heart sounds.           Pulmonary/Chest: Breath sounds normal. No respiratory distress. She has no wheezes. She has no rales.   Abdominal: Abdomen  is soft. Bowel sounds are normal. She exhibits no distension. There is no abdominal tenderness. There is no rebound.   Musculoskeletal:         General: Normal range of motion.      Cervical back: Normal range of motion and neck supple.     Neurological: She is alert and oriented to person, place, and time.   Ambulatory with a steady gait.   Skin: Skin is warm and dry. Capillary refill takes less than 2 seconds.         ED Course   Procedures  Labs Reviewed   COMPREHENSIVE METABOLIC PANEL - Abnormal; Notable for the following components:       Result Value    Sodium 135 (*)     Alkaline Phosphatase 47 (*)     Anion Gap 7 (*)     All other components within normal limits   CBC W/ AUTO DIFFERENTIAL - Abnormal; Notable for the following components:    RBC 3.89 (*)     Hemoglobin 11.5 (*)     Hematocrit 34.8 (*)     All other components within normal limits   TROPONIN I   HIV 1 / 2 ANTIBODY    Narrative:     Release to patient->Immediate   HEPATITIS C ANTIBODY    Narrative:     Release to patient->Immediate     EKG Readings: (Independently Interpreted)   11:56 Rate of 68. Normal sinus rhythm. Normal axis. Normal intervals. Lateral T wave inversions. No other ST or ischemic changes.          Imaging Results              X-Ray Chest AP Portable (Final result)  Result time 10/17/23 13:49:43      Final result by Onofre Alvarenga MD (10/17/23 13:49:43)                   Impression:      No convincing evidence of acute cardiopulmonary disease.      Electronically signed by: Onofre Alvarenga  Date:    10/17/2023  Time:    13:49               Narrative:    EXAMINATION:  XR CHEST AP PORTABLE    CLINICAL HISTORY:  Chest pain, unspecified    TECHNIQUE:  Single frontal view of the chest was performed.    COMPARISON:  None    FINDINGS:  Cardiomediastinal contours appear to be within normal limits.    Lungs appear essentially clear.    No definite pneumothorax or large volume pleural effusion.    No acute findings in the visualized  abdomen.                                    X-Rays:   Independently Interpreted Readings:   Chest X-Ray: Trachea midline. No cardiomegaly. No effusion, infiltrate, or edema.        Medications   aspirin tablet 325 mg (325 mg Oral Given 10/17/23 7365)     Medical Decision Making  12:20PM:  Patient is a 53-year-old female who presents to the emergency department with left-sided pinching chest pain, atypical in nature.  The pain was seemed to have been more persistent yesterday, improved today.  Her pain does not seem overly cardiac in nature.  She has good exertional stamina and was able to work out even while having this pain with no worsening of symptoms.  However given her age, will plan for labs, cardiac eval, will continue to follow and reassess.    Differential diagnosis:  Acute coronary syndrome, musculoskeletal chest wall pain, costochondritis, muscle spasm, esophageal spasm, stable angina    Amount and/or Complexity of Data Reviewed  External Data Reviewed: notes.  Labs: ordered. Decision-making details documented in ED Course.  Radiology: ordered and independent interpretation performed. Decision-making details documented in ED Course.     Details: See above  ECG/medicine tests: ordered and independent interpretation performed. Decision-making details documented in ED Course.     Details: See above    Risk  OTC drugs.    2:52 PM:  Patient doing well, remains stable.  She is feeling better with significant improvement of her pain.  Her labs are otherwise unremarkable with a negative cardiac workup.  I have a low suspicion that her pain is cardiac in nature.  She is able  I do not feel that further work up in the ED is indicated at this time.  I updated pt regarding results and I counseled pt regarding supportive care measures.  I have discussed with the pt ED return warnings and need for close PCP f/u.  Pt agreeable to plan and all questions answered.  I feel that pt is stable for discharge and management as  an outpatient and no further intervention is needed at this time.  Pt is comfortable returning to the ED if needed.  Will DC home in stable condition.          Scribe Attestation:   Scribe #1: I performed the above scribed service and the documentation accurately describes the services I performed. I attest to the accuracy of the note.              Physician Attestation for Scribe: I, Sury Cunningham, reviewed documentation as scribed in my presence, which is both accurate and complete.            Clinical Impression:   Final diagnoses:  [R07.9] Chest pain  [R07.89] Atypical chest pain (Primary)        ED Disposition Condition    Discharge Stable          ED Prescriptions    None       Follow-up Information       Follow up With Specialties Details Why Contact Info    Dhruv Celis MD Internal Medicine   [address]   [address]  [facility] [address]  [phone]               Sury Cunningham MD  10/17/23 0051

## 2023-10-17 NOTE — ED NOTES
Pt placed on continuous cardiac and pulse ox monitoring with blood pressure to cycle every 30 minutes. VS stable; NSR noted.  Bed locked in lowest position; side rails up and locked x 2; call light, bedside table, and personal belongings within reach.  Pt updated on plan of care; instructed to alert nurse for assistance before attempting to get out of bed; verbalizes understanding.  Pt denies needs or complaints at this time; will continue to monitor pt.

## 2023-10-17 NOTE — ED NOTES
"LOC: The patient is awake, alert, and oriented to self, place, time, and situation. Pt is calm and cooperative. Affect is appropriate.  Speech is appropriate and clear.     APPEARANCE: Patient resting on stretcher in no acute distress.  Patient is clean and well groomed.    SKIN: The skin is warm and dry; color consistent with ethnicity.  Patient has normal skin turgor and moist mucus membranes.  Skin intact; no breakdown or bruising noted.     MUSCULOSKELETAL: Patient moving upper and lower extremities without difficulty; denies pain in the extremities or back.  Denies weakness.     RESPIRATORY: Airway is open and patent. Respirations spontaneous, even, easy, and non-labored.  Patient has a normal effort and rate.  No accessory muscle use noted. Denies cough.     CARDIAC:  Normal heart rate noted.  No peripheral edema noted. No reports a "pinching" pain in the left side of the chest; not affected by movement or deep breath..     ABDOMEN: Soft and non tender to palpation.  No distention noted. Pt denies abdominal pain; denies nausea, vomiting, diarrhea, or constipation.    NEUROLOGIC: Eyes open spontaneously.  Behavior appropriate to situation.  Follows commands; facial expression symmetrical.  Purposeful motor response noted; normal sensation in all extremities. Pt denies headache; denies lightheadedness or dizziness; denies visual disturbances; denies loss of balance; denies unilateral weakness.      "

## 2024-02-09 NOTE — PROGRESS NOTES
Dipti Pacheco is a 49 y.o.  is here for a postpartum visit.  Estimated Date of Delivery: 19     DELIVERY HISTORY   delivery on 2019 at 41 gestation, male infant, weight  Second degree laceration with repair. breastfeeding infant.    Birth information:  YOB: 2018   Time of birth: 2:43 AM   Sex: male   Head Delivery Date/Time: 2019  2:43 AM   Delivery type: Vaginal, Spontaneous   Gestational Age: 37w3d        C/C: Postpartum exam. Had c/o rectal incontinence at 2 weeks postpartum--    Pregnancy was c/b by:  AMA,     HPI:  Doing well; ambulating and tolerating regular diet  Lochia: denies   Vaginal pain: denies  Abdominal pain: denies   Breasts/nipples:  breastfeeding well without difficulty and infant is gaining appropriately per their pediatrician  Bowel/bladder function: seeing uro-gynecology due to fecal incontinence and interested in pelvic floor PT referral/ names   Depression/anxiety: denies ; EPDS score: see flow sheet  Support at home: mother is here from [place], support group/ friends  Contraception: considering declines    Pap hx: 2019    Past Medical History:   Diagnosis Date    Allergy     Anxiety     Iron deficiency anemia secondary to inadequate dietary iron intake 2019    Subclinical hypothyroidism      Past Surgical History:   Procedure Laterality Date    dental implants      Egg retrieval       Review of patient's allergies indicates:   Allergen Reactions    Aspirin Nausea Only     Powdered form only        Current Outpatient Medications:     B INFANTIS/B ANI/B JET/B BIFID (PROBIOTIC DIGESTIVE CARE ORAL), Take 1 capsule by mouth as needed., Disp: , Rfl:     calcium-vitamin D3 (CALCIUM 500 + D) 500 mg(1,250mg) -200 unit per tablet, Take 1 tablet by mouth 2 (two) times daily with meals., Disp: , Rfl:     conjugated estrogens (PREMARIN) vaginal cream, 1 g with applicator or dime-sized amt with finger in vagina nightly x 2 weeks, then  twice a week thereafter, Disp: 45 g, Rfl: 4    cyanocobalamin, vitamin B-12, (VITAMIN B-12) 50 mcg tablet, Take 50 mcg by mouth once daily., Disp: , Rfl:     hydrocortisone 2.5 % cream, Apply topically 2 (two) times daily., Disp: 20 g, Rfl: 1    germán salguero ointment, Apply topically 3 (three) times daily. Apply after feeding. Do not wash off. This compounded medication expires in 30 days., Disp: 30 g, Rfl: 1    multivitamin (ONE DAILY MULTIVITAMIN) per tablet, Take 1 tablet by mouth once daily., Disp: , Rfl:     prenatal vit 55-iron-folic-om3 29-1-430 mg CPKD, Take 1 tablet by mouth once daily., Disp: , Rfl:     PE:  OB History    Para Term  AB Living   1 1 1     1   SAB TAB Ectopic Multiple Live Births         0 1      # Outcome Date GA Lbr Anurag/2nd Weight Sex Delivery Anes PTL Lv   1 Term 19 37w3d  2.8 kg (6 lb 2.8 oz) M Vag-Spont EPI N GILLIAN      Complications: Chorioamnionitis      There were no vitals filed for this visit.  LMP 2018   Gen: A&O x 4, NAD  Neck: non-tender, not enlarged, no masses or nodules  CV: normal HR  Lungs: normal resp effort  Breasts: lactating bilaterally: no masses, non-tender, nipples intact  Abdomen: soft, nontender, and nondistended, diastasis recti present  Vulva/Vagina: healed well, approximated, non-tender.   Speculum: cervix appears closed, no lesions, no abnormal discharge  Bimanual: uterus involuted, non-tender, neg CMT, adnexa free bilaterally      ASSESSMENT/PLAN:  Postpartum exam s/p  delivery  -- Counseling regarding resuming normal activities of exercise and work.  -- Postpartum precautions reviewed  -- Reviewed pap guidelines  Continue annual exams; return then or sooner if any symptoms of pp depression or any other problem.    Birth control counseling  --- aware of safe options, call if desires    Breast Feeding  -- Continue PNV while breastfeeding.                UPDATED MED LIST:  Current Outpatient Medications   Medication Sig Dispense  Refill    B INFANTIS/B ANI/B JET/B BIFID (PROBIOTIC DIGESTIVE CARE ORAL) Take 1 capsule by mouth as needed.      calcium-vitamin D3 (CALCIUM 500 + D) 500 mg(1,250mg) -200 unit per tablet Take 1 tablet by mouth 2 (two) times daily with meals.      conjugated estrogens (PREMARIN) vaginal cream 1 g with applicator or dime-sized amt with finger in vagina nightly x 2 weeks, then twice a week thereafter 45 g 4    cyanocobalamin, vitamin B-12, (VITAMIN B-12) 50 mcg tablet Take 50 mcg by mouth once daily.      hydrocortisone 2.5 % cream Apply topically 2 (two) times daily. 20 g 1    jack salguero ointment Apply topically 3 (three) times daily. Apply after feeding. Do not wash off. This compounded medication expires in 30 days. 30 g 1    multivitamin (ONE DAILY MULTIVITAMIN) per tablet Take 1 tablet by mouth once daily.      prenatal vit 55-iron-folic-om3 29-1-430 mg CPKD Take 1 tablet by mouth once daily.       No current facility-administered medications for this visit.        Ashly Jung CNM, MSN  09/03/2019  1:23 PM       bladder ca SOB

## 2024-10-31 NOTE — PROGRESS NOTES
HPI    DLS:  1/10/22    No eyedrops  No eye surgery     Pt here for check of ocular health.  Pt states she has trouble reading   fine print with her reading glasses.  Pt states in the morning she always   feels like she has to rub her eyes.  Pt states floaters are stable OD. Pt   denies flashes, headaches or eye pain OU.  Pt denies itching, tearing or   burning OU.       Last edited by Keyana Fuentes MA on 8/24/2023  9:31 AM.            Assessment /Plan     For exam results, see Encounter Report.      Vitreous floaters, bilateral              Stable, no holes, tears or RD   Optic nerve cupping of both eyes              OCT march 2021 WNL               today OCT WNL     Disease ruled out after examination     Presbyopia              Ok to continue with OTC readers     RTC 1 year, sooner PRN                                     Cancer related pain

## 2024-11-19 ENCOUNTER — PATIENT MESSAGE (OUTPATIENT)
Dept: ENDOCRINOLOGY | Facility: CLINIC | Age: 54
End: 2024-11-19
Payer: COMMERCIAL

## 2024-11-20 DIAGNOSIS — M85.80 LOW BONE MASS: Primary | ICD-10-CM

## 2024-12-18 ENCOUNTER — HOSPITAL ENCOUNTER (EMERGENCY)
Facility: HOSPITAL | Age: 54
Discharge: HOME | End: 2024-12-18
Attending: EMERGENCY MEDICINE
Payer: COMMERCIAL

## 2024-12-18 VITALS
BODY MASS INDEX: 21.39 KG/M2 | WEIGHT: 141.13 LBS | HEIGHT: 68 IN | HEART RATE: 60 BPM | RESPIRATION RATE: 18 BRPM | OXYGEN SATURATION: 99 % | TEMPERATURE: 98 F | DIASTOLIC BLOOD PRESSURE: 81 MMHG | SYSTOLIC BLOOD PRESSURE: 127 MMHG

## 2024-12-18 DIAGNOSIS — H91.93 BILATERAL HEARING LOSS, UNSPECIFIED HEARING LOSS TYPE: Primary | ICD-10-CM

## 2024-12-18 PROBLEM — H81.03 ENDOLYMPHATIC HYDROPS OF BOTH EARS: Status: ACTIVE | Noted: 2023-02-10

## 2024-12-18 PROCEDURE — 99283 EMERGENCY DEPT VISIT LOW MDM: CPT | Mod: ,,, | Performed by: OTOLARYNGOLOGY

## 2024-12-18 PROCEDURE — 99281 EMR DPT VST MAYX REQ PHY/QHP: CPT

## 2024-12-18 NOTE — HPI
Dipti Pacheco is a 54 y.o. female hx SNHL, known right TM perforation who  presented to ED today due to subjective decrease in hearing bilaterally and aural fullness.      Patient has a longstanding history of sensorineural hearing loss bilaterally, with better hearing on the right. Around a month ago patient had a severe URI, which also resulted in a OME on the right. She was treated with drops, oral abx, and steroids. She does not note an episode of increasing pain followed by relief with increasing drainage. Patient presented to Dr. Kyle's clinic on 10/15/24 to evaluate the subjective hearing changes and drainage in right ear. Audiogram demonstrated mixed hearing loss, conductive loss on the right, type b tymp on right.  Noted to have central 15% right TM perforation. Perforation noted to be stable on follow up visit 11/15/24. Patient recently saw HAZEL Lynn NP at [facility] for evaluation of concerns for hearing loss 2 days ago. Patient reported extremely uncomfortable/ pain while having her ears cleaned in clinic. Patient reports decreased hearing bilaterally since clinic so presents to ED for evaluation.     Patient denies drainage, vertigo or any other associated complaints. Patient has anxiety about losing what hearing she has left.

## 2024-12-18 NOTE — DISCHARGE INSTRUCTIONS
We know that you have many choices and are honored that you chose us. We hope that we met or exceeded your expectations and goals for this visit and will keep the [facility] family in mind for your future needs and those of your family and friends.     - Dr. Enriquez

## 2024-12-18 NOTE — CONSULTS
[facility] - Emergency Dept  Otorhinolaryngology-Head & Neck Surgery  Consult Note    Patient Name: Dipti Pacheco  MRN: [id number]  Code Status: Prior  Admission Date: 12/18/2024  Hospital Length of Stay: 0 days  Attending Physician: Roney Enriquez III, MD  Primary Care Provider: Dhruv Celis MD    Patient information was obtained from patient and ER records.     Inpatient consult to ENT  Consult performed by: Chelsey Jimenez MD  Consult ordered by: Roney Enriquez III, MD        Subjective:     Chief Complaint/Reason for Admission: subjective decrease in hearing bilaterally and aural fullness    History of Present Illness: Dipti Pacheco is a 54 y.o. female hx SNHL, known right TM perforation who  presented to ED today due to subjective decrease in hearing bilaterally and aural fullness.      Patient has a longstanding history of sensorineural hearing loss bilaterally, with better hearing on the right. Around a month ago patient had a severe URI, which also resulted in a OME on the right. She was treated with drops, oral abx, and steroids. She does not note an episode of increasing pain followed by relief with increasing drainage. Patient presented to Dr. Kyle's clinic on 10/15/24 to evaluate the subjective hearing changes and drainage in right ear. Audiogram demonstrated mixed hearing loss, conductive loss on the right, type b tymp on right.  Noted to have central 15% right TM perforation. Perforation noted to be healed on on follow up visit 11/12/24. Patient recently saw HAZEL Lynn NP at [facility] for evaluation of concerns for hearing loss 2 days ago. Patient reported extremely uncomfortable/ pain while having her ears cleaned in clinic. Patient reports decreased hearing bilaterally since clinic so presents to ED for evaluation.     Patient denies drainage, vertigo or any other associated complaints. Patient has anxiety about losing what hearing she has left.     Medications:  Continuous  Infusions:  Scheduled Meds:  PRN Meds:     No current facility-administered medications on file prior to encounter.     Current Outpatient Medications on File Prior to Encounter   Medication Sig    B INFANTIS/B ANI/B JET/B BIFID (PROBIOTIC DIGESTIVE CARE ORAL) Take 1 capsule by mouth as needed.    betahistine HCl (BETAHISTINE, BULK, MISC) by Misc.(Non-Drug; Combo Route) route. INJECTION    calcium-vitamin D3 (OS-ASIM 500 + D3) 500 mg-5 mcg (200 unit) per tablet Take 1 tablet by mouth 2 (two) times daily with meals.    conjugated estrogens (PREMARIN) vaginal cream 1 g with applicator or dime-sized amt with finger in vagina nightly x 2 weeks, then twice a week thereafter    conjugated estrogens (PREMARIN) vaginal cream 1 g with applicator or dime-sized amt with finger in vagina nightly x 2 weeks, then twice a week thereafter    cyanocobalamin, vitamin B-12, 50 mcg tablet Take 50 mcg by mouth once daily.    histamine phosphate 1 (2.75) mg/mL Soln Inject 1 mL into the skin.    hydroCHLOROthiazide (MICROZIDE) 12.5 mg capsule     hydrocortisone 2.5 % cream Apply topically 2 (two) times daily.    multivitamin (THERAGRAN) per tablet Take 1 tablet by mouth once daily.    solifenacin (VESICARE) 10 MG tablet Take 1 tablet (10 mg total) by mouth once daily.       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    Hearing loss sensory, bilateral 08/14/2015    Iron deficiency anemia secondary to inadequate dietary iron intake 07/19/2019    Subclinical hypothyroidism      Past Surgical History:   Procedure Laterality Date    COLONOSCOPY N/A 6/17/2020    Procedure: COLONOSCOPY, with me;  Surgeon: Karina Carreon MD;  Location: [facility] ([address] [place] [address]);  Service: Endoscopy;   Laterality: N/A;  prep ins emailed - ER[facility]  COCVID screening 6/15/20 - ER[facility]    dental implants      Egg retrieval       Family History       Problem Relation (Age of Onset)    Alcohol abuse Paternal Grandfather, Other    Breast cancer Mother (71)    Depression Other    Diabetes Mellitus Paternal Grandmother    Lung disease Paternal Cousin    No Known Problems Son          Tobacco Use    Smoking status: Never    Smokeless tobacco: Never   Substance and Sexual Activity    Alcohol use: Yes     Comment: socially    Drug use: No    Sexual activity: Not Currently     Partners: Male     Review of Systems   HENT:  Positive for hearing loss. Negative for congestion, ear discharge, ear pain and sore throat.      Objective:     Vital Signs (Most Recent):  Temp: 97.7 °F (36.5 °C) (12/18/24 1142)  Pulse: 60 (12/18/24 1142)  Resp: 18 (12/18/24 1142)  BP: 127/81 (12/18/24 1142)  SpO2: 99 % (12/18/24 1142) Vital Signs (24h Range):  Temp:  [97.7 °F (36.5 °C)] 97.7 °F (36.5 °C)  Pulse:  [60-75] 60  Resp:  [18-20] 18  SpO2:  [99 %] 99 %  BP: (127-128)/(81-88) 127/81     Weight: 64 kg (141 lb 1.5 oz)  Body mass index is 21.45 kg/m².         Physical Exam  Constitutional:       General: She is not in acute distress.     Appearance: Normal appearance. She is normal weight.   HENT:      Head: Normocephalic and atraumatic.      Left Ear: Tympanic membrane, ear canal and external ear normal.      Ears:      Comments: Right- mild irritation right EAC, central 15% perforation     Nose: Nose normal.      Mouth/Throat:      Mouth: Mucous membranes are dry.      Pharynx: Oropharynx is clear.   Eyes:      Pupils: Pupils are equal, round, and reactive to light.   Cardiovascular:      Rate and Rhythm: Normal rate and regular rhythm.   Pulmonary:      Effort: Pulmonary effort is normal.      Breath sounds: Normal breath sounds.   Musculoskeletal:      Cervical back: Normal range of motion.   Skin:     General: Skin is warm and dry.   Neurological:       "General: No focal deficit present.      Mental Status: She is alert and oriented to person, place, and time.   Psychiatric:         Mood and Affect: Mood normal.          Significant Labs:  CBC: No results for input(s): "WBC", "RBC", "HGB", "HCT", "PLT", "MCV", "MCH", "MCHC" in the last 168 hours.  CMP: No results for input(s): "GLU", "CALCIUM", "ALBUMIN", "PROT", "NA", "K", "CO2", "CL", "BUN", "CREATININE", "ALKPHOS", "ALT", "AST", "BILITOT" in the last 168 hours.    Significant Diagnostics:  None    Assessment/Plan:     Hearing loss sensory, bilateral  54 y.o. female hx SNHL who presented to ED today due to subjective decrease in hearing bilaterally and aural fullness after an ear cleaning 2 days. Right sided posterior inferior perforation noted 10/15, perforation noted to be healed on 11/12.  Exam today demonstrates a right sided central 10% perforation. Patient denies drainage, vertigo or any other associated complaints. Patient is afebrile, HD stable. No signs of otitis media or otitis externa.     - No acute ENT intervention  - Will arrange for close follow up with ENT and audiogram  - Dispo per ED           Chelsey Jimenez MD  Otorhinolaryngology-Head & Neck Surgery  [facility] - Emergency Dept  "

## 2024-12-18 NOTE — ASSESSMENT & PLAN NOTE
54 y.o. female hx SNHL who presented to ED today due to subjective decrease in hearing bilaterally and aural fullness after an ear cleaning 2 days ago in the setting of known right TM perforation. Patient denies drainage, vertigo or any other associated complaints. Patient is afebrile, HD stable. On exam, right perforation is stable. No signs of otitis media or otitis externa.     - Will arrange for close follow up with ENT and audiogram  - Dispo per ED

## 2024-12-18 NOTE — ED PROVIDER NOTES
Emergency Department Provider Note    Dipti Kati Pacheco   54 y.o. female   [id number]      12/18/2024       History     This history was obtained from the patient without limitations.  She drove herself to the ED.    She is a 54-year-old with the below past medical history.  She complains worsening of chronic hearing loss in both ears since Monday after being [place] [facility] [place] having cleaning of both ear canals performed using suction.  There was some discomfort during the procedure.  She began using Ciprodex following the procedure.  Additional history is that she developed hearing loss following a respiratory infection two months ago.  She was diagnosed with a rupture of the right tympanic membrane.  She completed a seven day course of Ciprodex at that time.  Her hearing was improving but she developed RSV three weeks later leading to diminished hearing once again.  She denies fever, chills, nausea, vomiting, headache, lightheadedness, and dizziness.         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    Hearing loss sensory, bilateral 08/14/2015    Iron deficiency anemia secondary to inadequate dietary iron intake 07/19/2019    Subclinical hypothyroidism       Past Surgical History:   Procedure Laterality Date    COLONOSCOPY N/A 6/17/2020    Procedure: COLONOSCOPY, with me;  Surgeon: Karina Carreon MD;  [facility] ([address] [place] [address]);  Service: Endoscopy;  Laterality: N/A;  prep ins emailed - [facility]  COCVID screening 6/15/20 - [facility]    dental implants      Egg retrieval        Family History   Problem Relation Name Age of Onset    Diabetes Mellitus Paternal Grandmother Susannah     Alcohol abuse Paternal Grandfather Triston     Breast cancer Mother Amanda 71        unilat; HR+    No Known Problems Son Tracy     Depression Other Annette     Alcohol abuse Other Annette     Lung disease Paternal  Cousin Wiley     Melanoma Neg Hx        Social History     Socioeconomic History    Marital status: Single   Occupational History     Employer: [employer]   Tobacco Use    Smoking status: Never    Smokeless tobacco: Never   Substance and Sexual Activity    Alcohol use: Yes     Comment: socially    Drug use: No    Sexual activity: Not Currently     Partners: Male   Other Topics Concern    Are you pregnant or think you may be? No    Breast-feeding No     Social Drivers of Health     Physical Activity: Sufficiently Active (6/16/2024)    Received from [email or web address]    Exercise Vital Sign     Days of Exercise per Week: 7 days     Minutes of Exercise per Session: 90 min   Stress: No Stress Concern Present (6/16/2024)    Received from [email or web address]    [employer] - Occupational Stress Questionnaire     Feeling of Stress : Not at all      Review of patient's allergies indicates:   Allergen Reactions    Aspirin Nausea Only and Nausea And Vomiting     Powdered form only   *powder form* when little           Physical Examination     Initial Vitals [12/18/24 0856]   BP Pulse Resp Temp SpO2   128/88 75 20 97.7 °F (36.5 °C) 99 %      MAP       --           Physical Exam    Nursing note and vitals reviewed.  Constitutional: She is not diaphoretic. No distress.   HENT:   Head: Normocephalic and atraumatic.   Right Ear: External ear normal. No drainage. No foreign bodies. No mastoid tenderness. Tympanic membrane is perforated. Decreased hearing is noted.   Left Ear: External ear normal. No drainage. No foreign bodies. No mastoid tenderness. Tympanic membrane is scarred. Decreased hearing is noted.   Small area of recent bleeding in R ear canal.  R TM has central perforation. TM is white and completely opacified.     L TM is completely opacified and scarred.     No pain with pinna manipulation.   Pulmonary/Chest: No respiratory distress.     Neurological: She is alert and oriented to person, place,  and time. GCS score is 15. GCS eye subscore is 4. GCS verbal subscore is 5. GCS motor subscore is 6.   Skin: Skin is warm and dry. No pallor.   Psychiatric: She has a normal mood and affect. Her speech is normal and behavior is normal. She is not actively hallucinating. She is attentive.            Labs     None       Imaging     Imaging Results    None           ED Course     The patient received the following medications:  None        ED Course as of 12/19/24 1650   Wed Dec 18, 2024   1015 Paged on-call ENT resident, Dr. Jimenez. [LP]   1016 Just spoke with Dr. Jimenez. She will come evaluate the patient. [LP]      ED Course User Index  [LP] Roney Enriquez III, MD        Medical Decision Making                 Medical Decision Making  The patient underwent evaluation for acute worsening of chronic hearing loss.  She denies acute otalgia.  ENT was consulted and evaluated the patient in the ED. Discharge with follow-up in ENT clinic was recommended.              Diagnoses       ICD-10-CM ICD-9-CM   1. Bilateral hearing loss, unspecified hearing loss type  H91.93 389.9         Dispostion      ED Disposition Condition    Discharge Stable            ED Prescriptions    None         Follow-up Information       Follow up With Specialties Details Why Contact Info Additional Information    [facility]  You will be contacted with follow-up information. [id number]  [facility] [address] [phone] Ear, Nose & Throat Services - Main Building, 4th Floor Please park in [facility] and use Clinic elevator    ER   Return to the ER for any concerns needing immediate attention.                Roney Enriquez III, MD  12/19/24 [id number]

## 2024-12-18 NOTE — SUBJECTIVE & OBJECTIVE
Medications:  Continuous Infusions:  Scheduled Meds:  PRN Meds:     No current facility-administered medications on file prior to encounter.     Current Outpatient Medications on File Prior to Encounter   Medication Sig    B INFANTIS/B ANI/B JET/B BIFID (PROBIOTIC DIGESTIVE CARE ORAL) Take 1 capsule by mouth as needed.    betahistine HCl (BETAHISTINE, BULK, MISC) by Misc.(Non-Drug; Combo Route) route. INJECTION    calcium-vitamin D3 (OS-ASIM 500 + D3) 500 mg-5 mcg (200 unit) per tablet Take 1 tablet by mouth 2 (two) times daily with meals.    conjugated estrogens (PREMARIN) vaginal cream 1 g with applicator or dime-sized amt with finger in vagina nightly x 2 weeks, then twice a week thereafter    conjugated estrogens (PREMARIN) vaginal cream 1 g with applicator or dime-sized amt with finger in vagina nightly x 2 weeks, then twice a week thereafter    cyanocobalamin, vitamin B-12, 50 mcg tablet Take 50 mcg by mouth once daily.    histamine phosphate 1 (2.75) mg/mL Soln Inject 1 mL into the skin.    hydroCHLOROthiazide (MICROZIDE) 12.5 mg capsule     hydrocortisone 2.5 % cream Apply topically 2 (two) times daily.    multivitamin (THERAGRAN) per tablet Take 1 tablet by mouth once daily.    solifenacin (VESICARE) 10 MG tablet Take 1 tablet (10 mg total) by mouth once daily.       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    Hearing loss sensory, bilateral 08/14/2015    Iron deficiency anemia secondary to inadequate dietary iron intake 07/19/2019    Subclinical hypothyroidism      Past Surgical History:   Procedure Laterality Date    COLONOSCOPY N/A 6/17/2020    Procedure: COLONOSCOPY, with me;  Surgeon: Karina Carreon MD;  Location: [address]  [facility]);  Service: Endoscopy;  Laterality: N/A;  prep ins emailed - [facility]  COCVID screening 6/15/20 - [facility]    dental implants      Egg retrieval       Family History       Problem Relation (Age of Onset)    Alcohol abuse Paternal Grandfather, Other    Breast cancer Mother (71)    Depression Other    Diabetes Mellitus Paternal Grandmother    Lung disease Paternal Cousin    No Known Problems Son          Tobacco Use    Smoking status: Never    Smokeless tobacco: Never   Substance and Sexual Activity    Alcohol use: Yes     Comment: socially    Drug use: No    Sexual activity: Not Currently     Partners: Male     Review of Systems   HENT:  Positive for hearing loss. Negative for congestion, ear discharge, ear pain and sore throat.      Objective:     Vital Signs (Most Recent):  Temp: 97.7 °F (36.5 °C) (12/18/24 1142)  Pulse: 60 (12/18/24 1142)  Resp: 18 (12/18/24 1142)  BP: 127/81 (12/18/24 1142)  SpO2: 99 % (12/18/24 1142) Vital Signs (24h Range):  Temp:  [97.7 °F (36.5 °C)] 97.7 °F (36.5 °C)  Pulse:  [60-75] 60  Resp:  [18-20] 18  SpO2:  [99 %] 99 %  BP: (127-128)/(81-88) 127/81     Weight: 64 kg (141 lb 1.5 oz)  Body mass index is 21.45 kg/m².         Physical Exam  Constitutional:       General: She is not in acute distress.     Appearance: Normal appearance. She is normal weight.   HENT:      Head: Normocephalic and atraumatic.      Left Ear: Tympanic membrane, ear canal and external ear normal.      Ears:      Comments: Right- mild irritation right EAC, central 15% perforation     Nose: Nose normal.      Mouth/Throat:      Mouth: Mucous membranes are dry.      Pharynx: Oropharynx is clear.   Eyes:      Pupils: Pupils are equal, round, and reactive to light.   Cardiovascular:      Rate and Rhythm: Normal rate and regular rhythm.   Pulmonary:      Effort: Pulmonary effort is normal.      Breath sounds: Normal breath sounds.   Musculoskeletal:      Cervical back: Normal range of motion.   Skin:     General: Skin is warm  "and dry.   Neurological:      General: No focal deficit present.      Mental Status: She is alert and oriented to person, place, and time.   Psychiatric:         Mood and Affect: Mood normal.          Significant Labs:  CBC: No results for input(s): "WBC", "RBC", "HGB", "HCT", "PLT", "MCV", "MCH", "MCHC" in the last 168 hours.  CMP: No results for input(s): "GLU", "CALCIUM", "ALBUMIN", "PROT", "NA", "K", "CO2", "CL", "BUN", "CREATININE", "ALKPHOS", "ALT", "AST", "BILITOT" in the last 168 hours.    Significant Diagnostics:  None    "

## 2024-12-18 NOTE — ED TRIAGE NOTES
Patient arrived to [facility] via personal transport for the chief complaint of bilateral hearing loss. Patient at baseline has difficulty hearing however patient states that both ears have had a marked decrease in ability to hear.

## 2025-01-06 ENCOUNTER — HOSPITAL ENCOUNTER (OUTPATIENT)
Dept: RADIOLOGY | Facility: CLINIC | Age: 55
Discharge: HOME | End: 2025-01-06
Attending: INTERNAL MEDICINE
Payer: COMMERCIAL

## 2025-01-06 DIAGNOSIS — M85.80 LOW BONE MASS: ICD-10-CM

## 2025-01-06 PROCEDURE — 77080 DXA BONE DENSITY AXIAL: CPT | Mod: TC

## 2025-01-06 PROCEDURE — 77080 DXA BONE DENSITY AXIAL: CPT | Mod: 26,,, | Performed by: INTERNAL MEDICINE

## 2025-01-12 ENCOUNTER — PATIENT MESSAGE (OUTPATIENT)
Dept: ENDOCRINOLOGY | Facility: CLINIC | Age: 55
End: 2025-01-12
Payer: COMMERCIAL

## 2025-01-28 ENCOUNTER — TELEPHONE (OUTPATIENT)
Dept: OPTOMETRY | Facility: CLINIC | Age: 55
End: 2025-01-28
Payer: COMMERCIAL

## 2025-01-28 NOTE — TELEPHONE ENCOUNTER
Pt states that she is no longer having eye pain and some slight lid swelling. Pt wanted to wait to see if symptoms improve before coming in. Informed pt to use warm compresses twice daily and use PF AT's TID-QID. Pt will contact office if symptoms don't improve.

## 2025-06-17 ENCOUNTER — OFFICE VISIT (OUTPATIENT)
Dept: OPTOMETRY | Facility: CLINIC | Age: 55
End: 2025-06-17
Payer: COMMERCIAL

## 2025-06-17 DIAGNOSIS — H43.393 VITREOUS FLOATERS, BILATERAL: ICD-10-CM

## 2025-06-17 DIAGNOSIS — D31.31 CHOROIDAL NEVUS OF RIGHT EYE: Primary | ICD-10-CM

## 2025-06-17 DIAGNOSIS — Z04.9 DISEASE RULED OUT AFTER EXAMINATION: ICD-10-CM

## 2025-06-17 DIAGNOSIS — H52.4 PRESBYOPIA: ICD-10-CM

## 2025-06-17 PROCEDURE — 92014 COMPRE OPH EXAM EST PT 1/>: CPT | Mod: S$GLB,,, | Performed by: OPTOMETRIST

## 2025-06-17 PROCEDURE — 92250 FUNDUS PHOTOGRAPHY W/I&R: CPT | Mod: S$GLB,,, | Performed by: OPTOMETRIST

## 2025-06-17 PROCEDURE — 92015 DETERMINE REFRACTIVE STATE: CPT | Mod: S$GLB,,, | Performed by: OPTOMETRIST

## 2025-06-17 PROCEDURE — 1159F MED LIST DOCD IN RCRD: CPT | Mod: CPTII,S$GLB,, | Performed by: OPTOMETRIST

## 2025-06-17 PROCEDURE — 99999 PR PBB SHADOW E&M-EST. PATIENT-LVL II: CPT | Mod: PBBFAC,,, | Performed by: OPTOMETRIST

## 2025-06-17 NOTE — PROGRESS NOTES
HPI    Patient here for routine eye exam  Using +1 otc readers and doing well  Distance VA is good  No pain or discomfort  She feels like she is seeing more floaters   No flashes  Last edited by Riana Epps on 6/17/2025  3:37 PM.            Assessment /Plan     For exam results, see Encounter Report.    Choroidal nevus of right eye  -    2025 Color Fundus Photography - OU - Both Eyes    Vitreous floaters, bilateral              Stable, no holes, tears or RD     Optic nerve cupping of both eyes  OD.OS              OCT 2021 WNL               OCT 2023 WNL     Disease ruled out after examination     Presbyopia              Ok to continue with OTC readers     RTC 1 year, sooner PRN

## 2025-07-06 ENCOUNTER — PATIENT MESSAGE (OUTPATIENT)
Dept: OPTOMETRY | Facility: CLINIC | Age: 55
End: 2025-07-06
Payer: COMMERCIAL